# Patient Record
Sex: FEMALE | Race: WHITE | NOT HISPANIC OR LATINO | Employment: UNEMPLOYED | URBAN - METROPOLITAN AREA
[De-identification: names, ages, dates, MRNs, and addresses within clinical notes are randomized per-mention and may not be internally consistent; named-entity substitution may affect disease eponyms.]

---

## 2023-06-12 ENCOUNTER — TELEPHONE (OUTPATIENT)
Dept: NEUROLOGY | Facility: CLINIC | Age: 38
End: 2023-06-12

## 2023-06-14 ENCOUNTER — TELEPHONE (OUTPATIENT)
Dept: NEUROLOGY | Facility: CLINIC | Age: 38
End: 2023-06-14

## 2023-06-16 ENCOUNTER — OFFICE VISIT (OUTPATIENT)
Dept: NEUROLOGY | Facility: CLINIC | Age: 38
End: 2023-06-16
Payer: COMMERCIAL

## 2023-06-16 ENCOUNTER — PATIENT MESSAGE (OUTPATIENT)
Dept: NEUROLOGY | Facility: CLINIC | Age: 38
End: 2023-06-16

## 2023-06-16 ENCOUNTER — LAB VISIT (OUTPATIENT)
Dept: LAB | Facility: HOSPITAL | Age: 38
End: 2023-06-16
Attending: PHYSICIAN ASSISTANT
Payer: COMMERCIAL

## 2023-06-16 VITALS — HEART RATE: 73 BPM | SYSTOLIC BLOOD PRESSURE: 106 MMHG | WEIGHT: 121 LBS | DIASTOLIC BLOOD PRESSURE: 74 MMHG

## 2023-06-16 DIAGNOSIS — R68.89 ABULIA: ICD-10-CM

## 2023-06-16 DIAGNOSIS — F48.2 PSEUDOBULBAR AFFECT: ICD-10-CM

## 2023-06-16 DIAGNOSIS — G20.C PARKINSONISM, UNSPECIFIED PARKINSONISM TYPE: ICD-10-CM

## 2023-06-16 DIAGNOSIS — R26.81 GAIT INSTABILITY: ICD-10-CM

## 2023-06-16 DIAGNOSIS — G20.C PARKINSONISM, UNSPECIFIED PARKINSONISM TYPE: Primary | ICD-10-CM

## 2023-06-16 LAB
ALBUMIN SERPL BCP-MCNC: 3.9 G/DL (ref 3.5–5.2)
ALP SERPL-CCNC: 107 U/L (ref 55–135)
ALT SERPL W/O P-5'-P-CCNC: 35 U/L (ref 10–44)
ANION GAP SERPL CALC-SCNC: 10 MMOL/L (ref 8–16)
AST SERPL-CCNC: 27 U/L (ref 10–40)
BASOPHILS # BLD AUTO: 0.04 K/UL (ref 0–0.2)
BASOPHILS NFR BLD: 0.5 % (ref 0–1.9)
BILIRUB SERPL-MCNC: 0.3 MG/DL (ref 0.1–1)
BUN SERPL-MCNC: 16 MG/DL (ref 6–20)
CALCIUM SERPL-MCNC: 10 MG/DL (ref 8.7–10.5)
CHLORIDE SERPL-SCNC: 104 MMOL/L (ref 95–110)
CHOLEST SERPL-MCNC: 181 MG/DL (ref 120–199)
CHOLEST/HDLC SERPL: 3.4 {RATIO} (ref 2–5)
CK SERPL-CCNC: 11 U/L (ref 20–180)
CO2 SERPL-SCNC: 25 MMOL/L (ref 23–29)
CREAT SERPL-MCNC: 0.7 MG/DL (ref 0.5–1.4)
DIFFERENTIAL METHOD: NORMAL
EOSINOPHIL # BLD AUTO: 0.1 K/UL (ref 0–0.5)
EOSINOPHIL NFR BLD: 1.4 % (ref 0–8)
ERYTHROCYTE [DISTWIDTH] IN BLOOD BY AUTOMATED COUNT: 12.7 % (ref 11.5–14.5)
EST. GFR  (NO RACE VARIABLE): >60 ML/MIN/1.73 M^2
GLUCOSE SERPL-MCNC: 74 MG/DL (ref 70–110)
HCT VFR BLD AUTO: 40.7 % (ref 37–48.5)
HDLC SERPL-MCNC: 54 MG/DL (ref 40–75)
HDLC SERPL: 29.8 % (ref 20–50)
HGB BLD-MCNC: 13.2 G/DL (ref 12–16)
IMM GRANULOCYTES # BLD AUTO: 0.02 K/UL (ref 0–0.04)
IMM GRANULOCYTES NFR BLD AUTO: 0.2 % (ref 0–0.5)
LDLC SERPL CALC-MCNC: 99.8 MG/DL (ref 63–159)
LYMPHOCYTES # BLD AUTO: 2.2 K/UL (ref 1–4.8)
LYMPHOCYTES NFR BLD: 27.2 % (ref 18–48)
MCH RBC QN AUTO: 30.8 PG (ref 27–31)
MCHC RBC AUTO-ENTMCNC: 32.4 G/DL (ref 32–36)
MCV RBC AUTO: 95 FL (ref 82–98)
MONOCYTES # BLD AUTO: 0.7 K/UL (ref 0.3–1)
MONOCYTES NFR BLD: 8.9 % (ref 4–15)
NEUTROPHILS # BLD AUTO: 5 K/UL (ref 1.8–7.7)
NEUTROPHILS NFR BLD: 61.8 % (ref 38–73)
NONHDLC SERPL-MCNC: 127 MG/DL
NRBC BLD-RTO: 0 /100 WBC
PATH REV BLD -IMP: NORMAL
PLATELET # BLD AUTO: 317 K/UL (ref 150–450)
PMV BLD AUTO: 9.7 FL (ref 9.2–12.9)
POTASSIUM SERPL-SCNC: 3.6 MMOL/L (ref 3.5–5.1)
PROT SERPL-MCNC: 7.4 G/DL (ref 6–8.4)
RBC # BLD AUTO: 4.28 M/UL (ref 4–5.4)
SODIUM SERPL-SCNC: 139 MMOL/L (ref 136–145)
TRIGL SERPL-MCNC: 136 MG/DL (ref 30–150)
WBC # BLD AUTO: 8.02 K/UL (ref 3.9–12.7)

## 2023-06-16 PROCEDURE — 84446 ASSAY OF VITAMIN E: CPT | Performed by: PHYSICIAN ASSISTANT

## 2023-06-16 PROCEDURE — 80061 LIPID PANEL: CPT | Performed by: PHYSICIAN ASSISTANT

## 2023-06-16 PROCEDURE — 99205 OFFICE O/P NEW HI 60 MIN: CPT | Mod: S$GLB,,, | Performed by: PHYSICIAN ASSISTANT

## 2023-06-16 PROCEDURE — 99205 PR OFFICE/OUTPT VISIT, NEW, LEVL V, 60-74 MIN: ICD-10-PCS | Mod: S$GLB,,, | Performed by: PHYSICIAN ASSISTANT

## 2023-06-16 PROCEDURE — 81271 HTT GENE DETC ABNOR ALLELES: CPT | Performed by: PHYSICIAN ASSISTANT

## 2023-06-16 PROCEDURE — 86038 ANTINUCLEAR ANTIBODIES: CPT | Performed by: PHYSICIAN ASSISTANT

## 2023-06-16 PROCEDURE — 36415 COLL VENOUS BLD VENIPUNCTURE: CPT | Performed by: PHYSICIAN ASSISTANT

## 2023-06-16 PROCEDURE — 86039 ANTINUCLEAR ANTIBODIES (ANA): CPT | Performed by: PHYSICIAN ASSISTANT

## 2023-06-16 PROCEDURE — 85060 BLOOD SMEAR INTERPRETATION: CPT | Mod: ,,, | Performed by: PATHOLOGY

## 2023-06-16 PROCEDURE — 86235 NUCLEAR ANTIGEN ANTIBODY: CPT | Mod: 59 | Performed by: PHYSICIAN ASSISTANT

## 2023-06-16 PROCEDURE — 82300 ASSAY OF CADMIUM: CPT | Performed by: PHYSICIAN ASSISTANT

## 2023-06-16 PROCEDURE — 86376 MICROSOMAL ANTIBODY EACH: CPT | Performed by: PHYSICIAN ASSISTANT

## 2023-06-16 PROCEDURE — 99999 PR PBB SHADOW E&M-EST. PATIENT-LVL III: ICD-10-PCS | Mod: PBBFAC,,, | Performed by: PHYSICIAN ASSISTANT

## 2023-06-16 PROCEDURE — 99417 PR PROLONGED SVC, OUTPT, W/WO DIRECT PT CONTACT,  EA ADDTL 15 MIN: ICD-10-PCS | Mod: S$GLB,,, | Performed by: PHYSICIAN ASSISTANT

## 2023-06-16 PROCEDURE — 86255 FLUORESCENT ANTIBODY SCREEN: CPT | Mod: 59 | Performed by: PHYSICIAN ASSISTANT

## 2023-06-16 PROCEDURE — 80053 COMPREHEN METABOLIC PANEL: CPT | Performed by: PHYSICIAN ASSISTANT

## 2023-06-16 PROCEDURE — 82550 ASSAY OF CK (CPK): CPT | Performed by: PHYSICIAN ASSISTANT

## 2023-06-16 PROCEDURE — 86147 CARDIOLIPIN ANTIBODY EA IG: CPT | Performed by: PHYSICIAN ASSISTANT

## 2023-06-16 PROCEDURE — 99417 PROLNG OP E/M EACH 15 MIN: CPT | Mod: S$GLB,,, | Performed by: PHYSICIAN ASSISTANT

## 2023-06-16 PROCEDURE — 82390 ASSAY OF CERULOPLASMIN: CPT | Performed by: PHYSICIAN ASSISTANT

## 2023-06-16 PROCEDURE — 86225 DNA ANTIBODY NATIVE: CPT | Performed by: PHYSICIAN ASSISTANT

## 2023-06-16 PROCEDURE — 86364 TISS TRNSGLTMNASE EA IG CLAS: CPT | Mod: 59 | Performed by: PHYSICIAN ASSISTANT

## 2023-06-16 PROCEDURE — 86800 THYROGLOBULIN ANTIBODY: CPT | Performed by: PHYSICIAN ASSISTANT

## 2023-06-16 PROCEDURE — 85025 COMPLETE CBC W/AUTO DIFF WBC: CPT | Performed by: PHYSICIAN ASSISTANT

## 2023-06-16 PROCEDURE — 99999 PR PBB SHADOW E&M-EST. PATIENT-LVL III: CPT | Mod: PBBFAC,,, | Performed by: PHYSICIAN ASSISTANT

## 2023-06-16 PROCEDURE — 85060 PATHOLOGIST REVIEW: ICD-10-PCS | Mod: ,,, | Performed by: PATHOLOGY

## 2023-06-16 NOTE — PROGRESS NOTES
Name: Thania Carroll  MRN: [id number]   CSN: [id number]      Date: 06/16/2023    Referring physician:  Aaareferral Self  No address on file    Chief Complaint: bradykinesia, apathy    History of Present Illness (HPI): Thania Carroll is a R handed 38 y.o. female with a medical issues significant for juvenile RA who presents for bradykinesia, apathy. She was seen at outside facility and diagnosed with catatonia and referred to psychiatry for management. She has no known past psychiatric illness. Accompanied by , mother and father. Family provides the majority of the history. She first noticed that she had trouble using her hands with holding her baby in May of 2022. She told her mom about in June/July 2022. They think maybe she started slowing down early 2022. Noticed that it would take her longer to wash her hair. She had trouble orienting things, had trouble holding onto things and losing her . Then they noticed she was moving slower entirely, walking slower.  noticed that she would turn slowly as well. No known psychiatric illness. The only medication she has ever been on was prednisone and methotrexate. She was not pregnant at the time whenever everything started. Two kids, 2 year and 6 weeks. Saw neurologist at [facility] in Nov 2022 and then saw another neurologist in [place] (Dr. Franco). At [facility], she was diagnosed with catatonia after seeing a psychiatry intern?. She was pregnant at this time. She never had a trial of benzos. She is still breast feeding. Dr. Franco did LP. She was hospitalized in January for further work up. She was told that she was healthy. They state that Dr. Franco suspected an autoimmune disorder. She did an extended EEG at the house, it was normal. They have not followed back up with Dr. Franco. Hands started to claw up at the beginning. She has had more muscle atrophy starting in Nov 2022. Speech started changing in fall last year. Since November she has had even more of a decline.  Seemed like things got worse whenever she was pregnant (End of August). She was still driving in August although she was still very slow. 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 She started with echolalia in Sept/Oct as well as palilalia. She would have episodes -- staring, and then come back too. Would last for a minute or two. Mom thought that her speech got better after she had the baby for a day or two. She was able to communicate just a little better. She started having more gait instability in 2022 and tripped over something whenever she went to check on their son. She had a few other falls in November whenever she got out of the bathtub, fell backwards. Another fall in January. Laughs and cries at odd times, started in . She had medrol dose pack in January. No improvement.         Family History: no family history of anything similar   Maternal grandmother and grandfather  in their 80s, grandmother had ovarian cancer. Grandfather had heart disease.   Paternal grandfather passed away with heart disease, stroke. Mother is still living.     Neuroleptic Exposure: none       From outside neurology note 2023  SUBJECTIVE (2023):   On my evaluation, patient has increased latency of speech and does not speak much. Much of the history was provided by  and mother.There is echolalia, echopraxia present along with emotional lability despite a largely blunted affect. Patient would intermittently stand up during the interview, and would try to walk around/out of the exam room. Patient also noted reaching for her mother's phone and for hand  out of nowhere.   Family reports that patient began experiencing 'slowness' that began at the beginning of . The first thing they noticed was slowing down of actions (grabbing things), with worsening after 2022, when pt became pregnant. It has progressed to her speech slowing down.   She does not  "had decreased PO intake of food or drink, and has not been working at the same level as a result of her presentation. Family reports that she is being followed by [facility], and they were considering bringing pt to [facility] for further evaluation.     Per initial H&P 11/2022  "She presents with her mother and her . She is currently 14 weeks pregnant. She has an 18 months child.  Per mother who provides most of the history, she noticed Thania has been acting slow and sluggish with her overall response to conversation and her activities. This has been going on for about a year. Mother was able to show me some video clip of around this time last year while she was in the clip with a family gathering. She appeared to be with her normal self.  Over the last year, she has been noticed to be quiet, does not initiate conversation, responding to conversation slow. Her friend would describe her like in slow motion movie. She has been working as a  for a small business. However she has been very behind with her workload. Mother went to her house often find she is still in pajamas even in the afternoon and she has not been able to get things done including taking care of the baby. However she does appear to be able to answer questions appropriately and her thought process seems intact.   Mother also reports that Thania has had at least 6 falls over this year. She slipped and fell in bathroom multiple times. Other times she stumbles and can not catch herself.   states she repeats herself a lot such as saying "I know" I know"". Mother reports she almost obsessively sets up timer for no reason and would check clock / timer without a clear task.  She does get emotional easily and sometimes would laugh or cry easily. She has a smile face in the office but mother states her smile is very rigid.   She has history of rheumatoid arthritis and has had small joint contracture in both hands. The contracture has been " "a lot worse recently and she can not open her hands. She denies any pain.  She denies any tremor, shuffling. She denies any difficulty swallowing. She denies bladder or bowel incontinence. No fever, headache or dizziness. She does report feeling tired easily and would complain of being sleepy.  She denies any depression or any life event that could trigger the above symptoms.   She had a head CT done recently which reported unremarkable."    Nonmotor/Premotor ROS:  Anosmia: normal   Dysarthria/Hypophonia: yes   Dysphagia/Sialorrhea: some dysphagia with water   Urinary changes: only after she had baby   Constipation: none   Falls: yes  Freezing: yes  Micrographia: none   Sleep issues:  -RBD: rarely talked in her sleep         Review of Systems:   Review of Systems   Constitutional:  Negative for chills, fever and malaise/fatigue.   HENT:  Negative for hearing loss.    Eyes:  Negative for blurred vision and double vision.   Respiratory:  Negative for cough, shortness of breath and stridor.    Cardiovascular:  Negative for chest pain and leg swelling.   Gastrointestinal:  Negative for constipation, diarrhea and nausea.   Genitourinary:  Negative for frequency and urgency.   Musculoskeletal:  Positive for falls.   Skin:  Negative for itching and rash.   Neurological:  Positive for speech change. Negative for dizziness, tremors, loss of consciousness and weakness.   Psychiatric/Behavioral:  Positive for memory loss. Negative for hallucinations.          Past Medical History: The patient  has no past medical history on file.    Social History: The patient      Family History: Their family history is not on file.    Allergies: Patient has no allergy information on record.     Meds:   No current outpatient medications on file prior to visit.     No current facility-administered medications on file prior to visit.       Exam:  /74   Pulse 73   Wt 54.9 kg (121 lb)     Constitutional  Well-developed, well-nourished, " appears stated age   Ophthalmoscopic  No papilledema with no hemorrhages or exudates bilaterally   Cardiovascular  Radial pulses 2+ and symmetric, no LE edema bilaterally   Neurological    * Mental status  MOCA = deferred today      - Orientation      - Memory       - Attention/concentration      - Language      - Fund of knowledge      - Executive      - Other   History provided by family -- patient answers few questions including name of her daughter. Abulic    * Cranial nerves       - CN II  PERRL, visual fields full to confrontation     - CN III, IV, VI  Blink to initiate saccades, saccadic intrusions    overcome by oculocephalic reflex     - CN V  Sensation V1 - V3 intact     - CN VII  Face strong and symmetric bilaterally     - CN VIII  Hearing intact bilaterally     - CN IX, X  Palate raises midline and symmetric     - CN XI  SCM and trapezius 5/5 bilaterally     - CN XII  Tongue midline   * Motor  Muscle bulk decreased/atrophy distally > proximally    Unable to elicit fasciculations    * Sensory   Intact to light touch    * Coordination  Unable to test    * Gait  See below.   * Deep tendon reflexes  3+ and symmetric throughout   Jaw jerk present, abnormal    Crossadductors    Bilateral moffett    2-3 beats clonus bilaterally    Babinski downgoing bilaterally   * Specialized movement exam  Abullic, rarely talks in the visit.    + PBA    mod facial masking.   Mod cogwheel rigidity b/l   Apraxia throughout    No tremor with rest, posture, kinesis, or intention.    No other athetosis, myoclonus, or tics.   No motor impersistence.    Decreased arm swing bilaterally    Moderate anterior stoop    Rocket sign, attempts to get out of the chair multiple times.     Oral dyskinesia    Abnormal truncal movements     Grasp reflex    Intermittently elevates bilateral UE          Reviewed past videos -- tongue thrusting, abulic. Scanning speech with echolalia.       Laboratory/Radiological:  - Results:  No visits with  results within 3 Month(s) from this visit.   Latest known visit with results is:   No results found for any previous visit.       - Independent review of images:    Reviewed outside imaging. Caudate and putaminal atrophy bilaterally.   Hyperintensities of bilateral caudates noted on brain mri in Nov 2022, not noted on most recent mri may 2023 although imaging is limited 2/2 motion artifact.         - Independent review of consultant's notes:     ASSESSMENT/PLAN:  Parkinsonism/bradykinesia   - concerning for neurodenegerative vs autoimmune disorder   - frontal lobe dysfunction, PBA, saccadic intrusions and blink to initiate saccades   - not clearly better with pregnancy (in fact they think it got worse)   - working on doing solumedrol close to home -- will work on getting 1000 mg daily x 5 days (hopefully in smoothie form)  - pending response, may do IVIG + IV solumedrol inpatient either here or closer to home for her in [facility].   - MRI called normal however there is atrophy out of proportion for age, particularly in bilateral caudate and putamen   - ruling out acanthocytosis and other labs as below   - HD testing and genetic PD panel   - rechecking ceruloplasmin (last checked while pregnant)  and may have been falsely elevated due to pregnancy. Will also plan to do 24 hr urine copper closer to home  - PT/OT/ST     Orders Placed This Encounter    CBC auto differential    Pathologist Interpretation Differential    Ceruloplasmin    Heavy Metals Screen, Blood (Quantitative)    GISEL Screen w/Reflex    Cardiolipin antibody    Movement Disorder, Autoimmune Eval, Serum    CK    Copper, urine, 24 hour    Hereditary Parkinsons Disease Panel    Maxwell Disease Analysis    Celiac Disease Panel    Thyroid Peroxidase Antibody    Anti-Thyroglobulin Antibody    VITAMIN E    Lipid Panel    Comprehensive metabolic panel    Ambulatory referral/consult to Physical/Occupational Therapy    Ambulatory referral/consult to  Physical/Occupational Therapy    Ambulatory referral/consult to Speech Therapy           Follow up: in 6 weeks     Collaborating Physician, Dr. Lyons, was available during today's encounter. Any change to plan along with cosign to appear in the EMR.       Total time spent with the patient: 160 minutes.  120 minutes of face-to-face consultation and 40 minutes of chart review and coordination of care, on the day of the visit. This includes face to face time and non-face to face time preparing to see the patient (eg, review of tests), obtaining and/or reviewing separately obtained history, documenting clinical information in the electronic or other health record, independently interpreting resultsand communicating results to the patient/family/caregiver, or care coordination.         Noehmy Brady PA-C   [facility]  Department of Neurology  Movement Disorders

## 2023-06-17 ENCOUNTER — PATIENT MESSAGE (OUTPATIENT)
Dept: NEUROLOGY | Facility: CLINIC | Age: 38
End: 2023-06-17
Payer: COMMERCIAL

## 2023-06-17 LAB
CERULOPLASMIN SERPL-MCNC: 33 MG/DL (ref 15–45)
THYROGLOB AB SERPL IA-ACNC: <4 IU/ML (ref 0–3.9)
THYROPEROXIDASE IGG SERPL-ACNC: <6 IU/ML

## 2023-06-19 DIAGNOSIS — G04.81 AUTOIMMUNE ENCEPHALITIS: Primary | ICD-10-CM

## 2023-06-19 LAB
ANA PATTERN 1: NORMAL
ANA SER QL IF: POSITIVE
ANA TITR SER IF: NORMAL {TITER}
ARSENIC BLD-MCNC: <1 NG/ML
CADMIUM BLD-MCNC: 0.2 NG/ML
CITY: NORMAL
COUNTY: NORMAL
GUARDIAN FIRST NAME: NORMAL
GUARDIAN LAST NAME: NORMAL
HOME PHONE: NORMAL
LEAD BLD-MCNC: <1 MCG/DL
MERCURY BLD-MCNC: <1 NG/ML
PATH REV BLD -IMP: NORMAL
RACE: NORMAL
STATE: NORMAL
STREET ADDRESS: NORMAL
VENOUS/CAPILLARY: NORMAL
ZIP: NORMAL

## 2023-06-19 NOTE — TELEPHONE ENCOUNTER
----- Message from Nohemy Brady PA-C sent at 6/16/2023  3:21 PM CDT -----  Need to figure out how to get this patient 1000 mg of solumedrol x 5 days in smoothie form where they are located.

## 2023-06-19 NOTE — TELEPHONE ENCOUNTER
Spoke to pt Mom. They will talk it over and let us know if she will proceed with the IV Solumedrol.   They want to check with pediatrician too.     Will look for their response tomorrow.

## 2023-06-20 ENCOUNTER — PATIENT MESSAGE (OUTPATIENT)
Dept: NEUROLOGY | Facility: CLINIC | Age: 38
End: 2023-06-20
Payer: COMMERCIAL

## 2023-06-20 LAB
ANTI SM ANTIBODY: 0.09 RATIO (ref 0–0.99)
ANTI SM/RNP ANTIBODY: 0.1 RATIO (ref 0–0.99)
ANTI-SM INTERPRETATION: NEGATIVE
ANTI-SM/RNP INTERPRETATION: NEGATIVE
ANTI-SSA ANTIBODY: 0.09 RATIO (ref 0–0.99)
ANTI-SSA INTERPRETATION: NEGATIVE
ANTI-SSB ANTIBODY: 0.09 RATIO (ref 0–0.99)
ANTI-SSB INTERPRETATION: NEGATIVE
DSDNA AB SER-ACNC: NORMAL [IU]/ML

## 2023-06-20 NOTE — TELEPHONE ENCOUNTER
Placed call to [facility]. Gave verbal order for oral Solumedrol   [facility] will mail it to the patient.      1g/vial- combine into a smoothie x 5 days.

## 2023-06-20 NOTE — TELEPHONE ENCOUNTER
[address]  [address]  [place], [place] [address]  Phone: [phone]  Fax: [phone]  Infusion Suite

## 2023-06-26 ENCOUNTER — PATIENT MESSAGE (OUTPATIENT)
Dept: NEUROLOGY | Facility: CLINIC | Age: 38
End: 2023-06-26
Payer: COMMERCIAL

## 2023-06-26 LAB
AMPA-R AB CBA, SERUM: NEGATIVE
AMPHIPHYSIN AB TITR SER: NEGATIVE {TITER}
AP3B2 IFA: NEGATIVE
CASPR2-IGG CBA: NEGATIVE
CV2 IGG SER QL IB: NEGATIVE
DPPX IGG SERPL QL IF: NEGATIVE
GABA-B-R AB CBA, SERUM: NEGATIVE
GAD65 AB SER-SCNC: 0 NMOL/L
GFAP ALPHA IGG SER QL IF: NEGATIVE
GLIAL NUC TYPE 1 AB TITR SER: NEGATIVE {TITER}
GRAF1 IFA,S: NEGATIVE
HU1 AB TITR SER: NEGATIVE {TITER}
HU2 AB TITR SER IF: NEGATIVE {TITER}
HU3 AB TITR SER: NEGATIVE {TITER}
IGLON5 IFA, S: NEGATIVE
IMMUNOLOGIST REVIEW: NORMAL
ITPR1 IFA, S: NEGATIVE
KELCH-LIKE PROTEIN ANTIBODY, SERUM: NEGATIVE
LGI1-IGG CBA: NEGATIVE
M SEPTIN-5 IFA, S: NEGATIVE
M SEPTIN-7 IFA, S: NEGATIVE
MGLUR1 AB IFA, SERUM: NEGATIVE
NEUROCHONDRIN, IFA, S: NEGATIVE
NIF IFA, S: NEGATIVE
NMDA-R AB CBA, SERUM: NEGATIVE
PCA-1 AB TITR SER: NEGATIVE {TITER}
PCA-2 AB TITR SER: NEGATIVE {TITER}
PCA-TR AB TITR SER: NEGATIVE {TITER}
VGCC-P/Q BIND AB SER-SCNC: 0 NMOL/L

## 2023-06-27 ENCOUNTER — TELEPHONE (OUTPATIENT)
Dept: NEUROLOGY | Facility: CLINIC | Age: 38
End: 2023-06-27
Payer: COMMERCIAL

## 2023-06-27 NOTE — TELEPHONE ENCOUNTER
----- Message from Pramodron Lux sent at 6/27/2023  3:55 PM CDT -----  Regarding: Specimen Issue  There was an issue with the Vit E, Anti Cardiolipin and Celiac disease test ordered on this patient from 06/16/23.    Due to a shipping error, the reference lab received the sample thawed. The order has been cancelled. You will need to reorder and contact the patient for recollection if clinically indicated.    If there are any questions, please call the Sendout lab at [phone] ext. [phone].  Anyone in the Sendout lab will be able to assist you.

## 2023-06-27 NOTE — TELEPHONE ENCOUNTER
----- Message from Loree Astorga sent at 6/27/2023  9:45 AM CDT -----  Regarding: pt advice  Contact: prakash Michele @[phone]  Caller requesting call back in regards to pt care due to fall and injuring head. Pls call to discuss. Caller stated it is an emergency type situation. Pt is seen by Dr Lyons as well, anyone can respond to message.

## 2023-06-27 NOTE — TELEPHONE ENCOUNTER
----- Message from Darling Mata sent at 6/27/2023 12:00 PM CDT -----  Regarding: return call  Contact: @ [phone]  Pt is returning a missed call from [facility]... in regards to plan of care ...Please call and adv @ [phone]

## 2023-06-27 NOTE — TELEPHONE ENCOUNTER
----- Message from Darling Mata sent at 6/27/2023 12:00 PM CDT -----  Regarding: return call  Contact: @ [phone]  Pt is returning a missed call from [facility]... in regards to plan of care ...Please call and adv @ [phone]

## 2023-06-27 NOTE — TELEPHONE ENCOUNTER
"Called patient's mother. She feels she is talking more/initiating conversation a bit more/answering questions. Fell and has gash on her head, planning on bring her to urgent care or ED to see if she needs sutures. Mental status at baseline. "Im ready to feed my baby momma" which was a long sentence for her, more conversation. Back to talking to Patricia more. Not as much echolalia. Not a marked improvement but mother has noticed some improvement. Just had 4th dose of steroids.  thinks she is louder and moving a little bit but not a robust improvement with the steroids. Discussed plan would be to do inpatient IVIG + iv solumedrol.       RBR    "

## 2023-06-27 NOTE — TELEPHONE ENCOUNTER
----- Message from Loree Astorga sent at 6/27/2023  9:45 AM CDT -----  Regarding: pt advice  Contact: prakash Michele @[phone]  Caller requesting call back in regards to pt care due to fall and injuring head. Pls call to discuss. Caller stated it is an emergency type situation. Pt is seen by Dr Lyons as well, anyone can respond to message.

## 2023-06-27 NOTE — TELEPHONE ENCOUNTER
Patient message was forward to Nurse  Fran.+    Note  ----- Message from Loree Astorga sent at 6/27/2023  9:45 AM CDT -----  Regarding: pt advice  Contact: prakash Michele @[phone]  Caller requesting call back in regards to pt care due to fall and injuring head. Pls call to discuss. Caller stated it is an emergency type situation. Pt is seen by Dr Lyons as well, anyone can respond to message.

## 2023-06-28 ENCOUNTER — TELEPHONE (OUTPATIENT)
Dept: NEUROLOGY | Facility: CLINIC | Age: 38
End: 2023-06-28
Payer: COMMERCIAL

## 2023-06-28 ENCOUNTER — PATIENT MESSAGE (OUTPATIENT)
Dept: NEUROLOGY | Facility: CLINIC | Age: 38
End: 2023-06-28
Payer: COMMERCIAL

## 2023-06-28 LAB
CLINICAL BIOCHEMIST REVIEW: NORMAL
HD REASON FOR REFERRAL: NORMAL
HD RELEASED BY: NORMAL
HD RESULT SUMMARY: NEGATIVE
HD RESULT: NORMAL
HD SPECIMEN: NORMAL
SPECIMEN SOURCE: NORMAL

## 2023-06-28 NOTE — TELEPHONE ENCOUNTER
----- Message from Ирина Prado sent at 2023  4:07 PM CDT -----  Regarding: Missed Call  Contact: [phone]  Pt is returning a missed call from someone in the office and is asking for a return call back soon. Thanks.    Reason for call: Missed call from beau in regards to a fall    Patient's DX:n/a    Patient requesting call back or Yovanichsalazar [phone]

## 2023-07-03 ENCOUNTER — PATIENT MESSAGE (OUTPATIENT)
Dept: NEUROLOGY | Facility: CLINIC | Age: 38
End: 2023-07-03
Payer: COMMERCIAL

## 2023-07-11 ENCOUNTER — PATIENT MESSAGE (OUTPATIENT)
Dept: NEUROLOGY | Facility: CLINIC | Age: 38
End: 2023-07-11
Payer: COMMERCIAL

## 2023-07-11 DIAGNOSIS — G20.C PARKINSONISM, UNSPECIFIED PARKINSONISM TYPE: Primary | ICD-10-CM

## 2023-07-11 NOTE — TELEPHONE ENCOUNTER
Address, Phone and Hours  [phone]  [facility] [address], [place], [address],

## 2023-07-14 ENCOUNTER — TELEPHONE (OUTPATIENT)
Dept: NEUROLOGY | Facility: CLINIC | Age: 38
End: 2023-07-14
Payer: COMMERCIAL

## 2023-07-14 DIAGNOSIS — R68.89 ABULIA: ICD-10-CM

## 2023-07-14 DIAGNOSIS — F48.2 PSEUDOBULBAR AFFECT: ICD-10-CM

## 2023-07-14 DIAGNOSIS — G20.C PARKINSONISM, UNSPECIFIED PARKINSONISM TYPE: Primary | ICD-10-CM

## 2023-07-14 LAB — HEREDITARY PARKINSONS DISEASE PANEL: NORMAL

## 2023-07-14 NOTE — TELEPHONE ENCOUNTER
Called number provided with no answer.  I was able to reprint out a copy of her labs that were faxed over to [facility] on 07/7/23.  Orders fax: [phone]

## 2023-07-14 NOTE — TELEPHONE ENCOUNTER
----- Message from Ambrocio Ingram sent at 7/14/2023 11:30 AM CDT -----  Regarding: Orders  Contact: Cindy/ [phone]  [facility] orders were not received, would like to know where orders were faxed, please call Cindy/,mother @[phone]    Please fax to: [address] in [place] [phone]

## 2023-07-21 NOTE — PROGRESS NOTES
Name: Thania Carroll  MRN: [id number]   CSN: [id number]      Date: 07/24/2023    Referring physician:  No referring provider defined for this encounter.    Chief Complaint: bradykinesia, apathy      Interval History:  - 5 day course of solumedrol that did not show much improvement   - maybe some euphoria while on steroids   - more imbalance   - several falls since last visit, when going from sitting to standing, turning   - accompanied by spouse and parents today   - 5 years prior she traveled to [place], [place], [facility]   - had neurofilament light chain at outside lab  - 8.78 pg/ml (reference range 0.0-1.87)  - some choking on foods and liquids         From June 2023: Thania Carroll is a R handed 38 y.o. female with a medical issues significant for juvenile RA who presents for bradykinesia, apathy. She was seen at outside facility and diagnosed with catatonia and referred to psychiatry for management. She has no known past psychiatric illness. Accompanied by , mother and father. Family provides the majority of the history. She first noticed that she had trouble using her hands with holding her baby in May of 2022. She told her mom about in June/July 2022. They think maybe she started slowing down early 2022. Noticed that it would take her longer to wash her hair. She had trouble orienting things, had trouble holding onto things and losing her . Then they noticed she was moving slower entirely, walking slower.  noticed that she would turn slowly as well. No known psychiatric illness. The only medication she has ever been on was prednisone and methotrexate. She was not pregnant at the time whenever everything started. Two kids, 2 year and 6 weeks. Saw neurologist at [facility] in Nov 2022 and then saw another neurologist in [place] (Dr. Franco). At [facility], she was diagnosed with catatonia after seeing a psychiatry intern?. She was pregnant at this time. She never had a trial of benzos. She is still  breast feeding. Dr. Franco did LP. She was hospitalized in January for further work up. She was told that she was healthy. They state that Dr. Franco suspected an autoimmune disorder. She did an extended EEG at the house, it was normal. They have not followed back up with Dr. Franco. Hands started to claw up at the beginning. She has had more muscle atrophy starting in 2022. Speech started changing in  last year. Since November she has had even more of a decline. Seemed like things got worse whenever she was pregnant (End of August). She was still driving in August although she was still very slow. 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 She started with echolalia in Sept/Oct as well as palilalia. She would have episodes -- staring, and then come back too. Would last for a minute or two. Mom thought that her speech got better after she had the baby for a day or two. She was able to communicate just a little better. She started having more gait instability in 2022 and tripped over something whenever she went to check on their son. She had a few other falls in November whenever she got out of the bathtub, fell backwards. Another fall in January. Laughs and cries at odd times, started in . She had medrol dose pack in January. No improvement.         Family History: no family history of anything similar   Maternal grandmother and grandfather  in their 80s, grandmother had ovarian cancer. Grandfather had heart disease.   Paternal grandfather passed away with heart disease, stroke. Mother is still living.     Neuroleptic Exposure: none       From outside neurology note 2023  SUBJECTIVE (2023):   On my evaluation, patient has increased latency of speech and does not speak much. Much of the history was provided by  and mother.There is echolalia, echopraxia present along with emotional lability despite a largely blunted affect. Patient would  "intermittently stand up during the interview, and would try to walk around/out of the exam room. Patient also noted reaching for her mother's phone and for hand  out of nowhere.   Family reports that patient began experiencing 'slowness' that began at the beginning of 2022. The first thing they noticed was slowing down of actions (grabbing things), with worsening after August 2022, when pt became pregnant. It has progressed to her speech slowing down.   She does not had decreased PO intake of food or drink, and has not been working at the same level as a result of her presentation. Family reports that she is being followed by [facility], and they were considering bringing pt to [facility] for further evaluation.     Per initial H&P 11/2022  "She presents with her mother and her . She is currently 14 weeks pregnant. She has an 18 months child.  Per mother who provides most of the history, she noticed Thania has been acting slow and sluggish with her overall response to conversation and her activities. This has been going on for about a year. Mother was able to show me some video clip of around this time last year while she was in the clip with a family gathering. She appeared to be with her normal self.  Over the last year, she has been noticed to be quiet, does not initiate conversation, responding to conversation slow. Her friend would describe her like in slow motion movie. She has been working as a  for a small business. However she has been very behind with her workload. Mother went to her house often find she is still in pajamas even in the afternoon and she has not been able to get things done including taking care of the baby. However she does appear to be able to answer questions appropriately and her thought process seems intact.   Mother also reports that Thania has had at least 6 falls over this year. She slipped and fell in bathroom multiple times. Other times she stumbles and can " "not catch herself.   states she repeats herself a lot such as saying "I know" I know"". Mother reports she almost obsessively sets up timer for no reason and would check clock / timer without a clear task.  She does get emotional easily and sometimes would laugh or cry easily. She has a smile face in the office but mother states her smile is very rigid.   She has history of rheumatoid arthritis and has had small joint contracture in both hands. The contracture has been a lot worse recently and she can not open her hands. She denies any pain.  She denies any tremor, shuffling. She denies any difficulty swallowing. She denies bladder or bowel incontinence. No fever, headache or dizziness. She does report feeling tired easily and would complain of being sleepy.  She denies any depression or any life event that could trigger the above symptoms.   She had a head CT done recently which reported unremarkable."    Nonmotor/Premotor ROS:  Anosmia: normal   Dysarthria/Hypophonia: yes   Dysphagia/Sialorrhea: some dysphagia with water   Urinary changes: only after she had baby   Constipation: none   Falls: yes  Freezing: yes  Micrographia: none   Sleep issues:  -RBD: rarely talked in her sleep         Review of Systems:   Review of Systems   Constitutional:  Negative for chills, fever and malaise/fatigue.   HENT:  Negative for hearing loss.    Eyes:  Negative for blurred vision and double vision.   Respiratory:  Negative for cough, shortness of breath and stridor.    Cardiovascular:  Negative for chest pain and leg swelling.   Gastrointestinal:  Negative for constipation, diarrhea and nausea.   Genitourinary:  Negative for frequency and urgency.   Musculoskeletal:  Positive for falls.   Skin:  Negative for itching and rash.   Neurological:  Positive for speech change. Negative for dizziness, tremors, loss of consciousness and weakness.   Psychiatric/Behavioral:  Positive for memory loss. Negative for hallucinations.  "         Past Medical History: The patient  has no past medical history on file.    Social History: The patient      Family History: Their family history is not on file.    Allergies: Patient has no known allergies.     Meds:   Current Outpatient Medications on File Prior to Visit   Medication Sig Dispense Refill    methylPREDNISolone sodium succinate (SOLU-MEDROL) 1,000 mg injection MIX 1g (1 SYRINGE) into a smoothie to take once daily for 5 doses 5 each 0     No current facility-administered medications on file prior to visit.       Exam:  /69   Pulse 76   Wt 55.3 kg (122 lb)     Constitutional  Well-developed, well-nourished, appears stated age   Ophthalmoscopic  No papilledema with no hemorrhages or exudates bilaterally   Cardiovascular  Radial pulses 2+ and symmetric, no LE edema bilaterally   Neurological    * Mental status  MOCA = deferred today      - Orientation      - Memory       - Attention/concentration      - Language      - Fund of knowledge      - Executive      - Other   History provided by family -- patient answers few questions including name of her daughter. Abulic    * Cranial nerves       - CN II  PERRL, visual fields full to confrontation     - CN III, IV, VI  Blink to initiate saccades, saccadic intrusions    overcome by oculocephalic reflex     - CN V  Sensation V1 - V3 intact     - CN VII  Face strong and symmetric bilaterally     - CN VIII  Hearing intact bilaterally     - CN IX, X  Palate raises midline and symmetric     - CN XI  SCM and trapezius 5/5 bilaterally     - CN XII  Tongue midline   * Motor  Muscle bulk decreased/atrophy distally > proximally    Unable to elicit fasciculations    * Sensory   Intact to light touch    * Coordination  Unable to test    * Gait  See below.   * Deep tendon reflexes  3+ and symmetric throughout   Jaw jerk present, abnormal    Crossadductors    Bilateral moffett    2-3 beats clonus bilaterally    Babinski downgoing bilaterally   * Specialized  movement exam  Abullic, rarely talks in the visit.    + PBA    mod facial masking.   Mod cogwheel rigidity b/l   Apraxia throughout    No tremor with rest, posture, kinesis, or intention.    No other athetosis, myoclonus, or tics.   No motor impersistence.    Decreased arm swing bilaterally    Moderate anterior stoop    Rocket sign, attempts to get out of the chair multiple times.     Oral dyskinesia    Abnormal truncal movements     Grasp reflex    Intermittently elevates bilateral UE          Reviewed past videos -- tongue thrusting, abulic. Scanning speech with echolalia.       Laboratory/Radiological:  - Results:  Lab Visit on 06/16/2023   Component Date Value Ref Range Status    WBC 06/16/2023 8.02  3.90 - 12.70 K/uL Final    RBC 06/16/2023 4.28  4.00 - 5.40 M/uL Final    Hemoglobin 06/16/2023 13.2  12.0 - 16.0 g/dL Final    Hematocrit 06/16/2023 40.7  37.0 - 48.5 % Final    MCV 06/16/2023 95  82 - 98 fL Final    MCH 06/16/2023 30.8  27.0 - 31.0 pg Final    MCHC 06/16/2023 32.4  32.0 - 36.0 g/dL Final    RDW 06/16/2023 12.7  11.5 - 14.5 % Final    Platelets 06/16/2023 317  150 - 450 K/uL Final    MPV 06/16/2023 9.7  9.2 - 12.9 fL Final    Immature Granulocytes 06/16/2023 0.2  0.0 - 0.5 % Final    Gran # (ANC) 06/16/2023 5.0  1.8 - 7.7 K/uL Final    Immature Grans (Abs) 06/16/2023 0.02  0.00 - 0.04 K/uL Final    Lymph # 06/16/2023 2.2  1.0 - 4.8 K/uL Final    Mono # 06/16/2023 0.7  0.3 - 1.0 K/uL Final    Eos # 06/16/2023 0.1  0.0 - 0.5 K/uL Final    Baso # 06/16/2023 0.04  0.00 - 0.20 K/uL Final    nRBC 06/16/2023 0  0 /100 WBC Final    Gran % 06/16/2023 61.8  38.0 - 73.0 % Final    Lymph % 06/16/2023 27.2  18.0 - 48.0 % Final    Mono % 06/16/2023 8.9  4.0 - 15.0 % Final    Eosinophil % 06/16/2023 1.4  0.0 - 8.0 % Final    Basophil % 06/16/2023 0.5  0.0 - 1.9 % Final    Differential Method 06/16/2023 Automated   Final    Pathologist Review 06/16/2023 Review completed   Final    Ceruloplasmin 06/16/2023 33.0   15.0 - 45.0 mg/dL Final    Arsenic 06/16/2023 <1  <13 ng/mL Final    Lead 06/16/2023 <1.0  <5.0 mcg/dL Final    Cadmium 06/16/2023 0.2  <5.0 ng/mL Final    Mercury 06/16/2023 <1  <10 ng/mL Final    Venous/Capillary 06/16/2023 Venous   Final    Street Address 06/16/2023 Test Not Performed   Final    City 06/16/2023 Test Not Performed   Final    State 06/16/2023 Test Not Performed   Final    Zip 06/16/2023 Test Not Performed   Final    [facility] 06/16/2023 Test Not Performed   Final    Guardian First Name 06/16/2023 Test Not Performed   Final    Guardian Last Name 06/16/2023 Test Not Performed   Final    Home Phone 06/16/2023 Test Not Performed   Final    Race 06/16/2023 Test Not Performed   Final    GISEL Screen 06/16/2023 Positive (A)  Negative <1:80 Final    PAVAL,  Amphiphysin Ab, Serum 06/16/2023 Negative  Negative Final    PAVAL AGNA-1, Serum 06/16/2023 Negative  Negative Final    PAVAL EDD-1, Serum 06/16/2023 Negative  Negative Final    PAVAL EDD-2, Serum 06/16/2023 Negative  Negative Final    PAVAL EDD-3, Serum 06/16/2023 Negative  Negative Final    CASPR2-IgG CBA 06/16/2023 Negative  Negative Final    Collapsin Response- Protei* 06/16/2023 Negative  Negative Final    DPPX Ab IFA, S 06/16/2023 Negative  Negative Final    Glutamic Acid Decarb Ab 06/16/2023 0.00  <=0.02 nmol/L Final    LGI1-IgG CBA 06/16/2023 Negative  Negative Final    mGluR1 Ab, IFA, Serum 06/16/2023 Negative  Negative Final    NMDA-R Ab CBA, Serum 06/16/2023 Negative  Negative Final    P/Q Type Calcium Channel Ab 06/16/2023 0.00  <=0.02 nmol/L Final    PAVAL, PCA-1, Serum 06/16/2023 Negative  Negative Final    PAVAL, PCA-2, Serum 06/16/2023 Negative  Negative Final    PAVAL, PCA-Tr, Serum 06/16/2023 Negative  Negative Final    GRAF1 IFA,S 06/16/2023 Negative  Negative Final    IgLON5 IFA, S 06/16/2023 Negative  Negative Final    ITPR1 IFA, S 06/16/2023 Negative  Negative Final    NIF IFA, S 06/16/2023 Negative  Negative Final     Movement Disorder Interpretation 06/16/2023 SEE BELOW   Final    KLHK 11 Antibody CBA 06/16/2023 Negative  Negative Final    AP3B2 IFA 06/16/2023 Negative  Negative Final    GFAP IFA, S 06/16/2023 Negative  Negative Final    M SEPTIN-5 IFA, S 06/16/2023 Negative  Negative Final    M SEPTIN-7 IFA, S 06/16/2023 Negative  Negative Final    NEUROCHONDRIN, IFA, S 06/16/2023 Negative  Negative Final    AMPA-R Ab CBA, Serum 06/16/2023 Negative  Negative Final    SALINA-B-R Ab CBA, Serum 06/16/2023 Negative  Negative Final    CPK 06/16/2023 11 (L)  20 - 180 U/L Final    Hereditary Parkinsons Disease Panel 06/16/2023 See result image via hyperlink   Corrected    HD Result Summary 06/16/2023 NEGATIVE   Final    HD Result 06/16/2023 CAG repeat: 18 and 22 (Normal)   Final    HD interpretation 06/16/2023 SEE BELOW   Final    HD Reason for Referral 06/16/2023 SEE BELOW   Final    HD Specimen 06/16/2023 WB Whole Blood   Final    HD Source 06/16/2023 Test Not Performed   Final    HD Released By 06/16/2023 Tyalor Bueno M.D.   Final    Thyroperoxidase Antibodies 06/16/2023 <6.0  <6.0 IU/mL Final    Thyroglobulin Ab Screen 06/16/2023 <4.0  0.0 - 3.9 IU/mL Final    Cholesterol 06/16/2023 181  120 - 199 mg/dL Final    Triglycerides 06/16/2023 136  30 - 150 mg/dL Final    HDL 06/16/2023 54  40 - 75 mg/dL Final    LDL Cholesterol 06/16/2023 99.8  63.0 - 159.0 mg/dL Final    HDL/Cholesterol Ratio 06/16/2023 29.8  20.0 - 50.0 % Final    Total Cholesterol/HDL Ratio 06/16/2023 3.4  2.0 - 5.0 Final    Non-HDL Cholesterol 06/16/2023 127  mg/dL Final    Sodium 06/16/2023 139  136 - 145 mmol/L Final    Potassium 06/16/2023 3.6  3.5 - 5.1 mmol/L Final    Chloride 06/16/2023 104  95 - 110 mmol/L Final    CO2 06/16/2023 25  23 - 29 mmol/L Final    Glucose 06/16/2023 74  70 - 110 mg/dL Final    BUN 06/16/2023 16  6 - 20 mg/dL Final    Creatinine 06/16/2023 0.7  0.5 - 1.4 mg/dL Final    Calcium 06/16/2023 10.0  8.7 - 10.5 mg/dL Final    Total  Protein 06/16/2023 7.4  6.0 - 8.4 g/dL Final    Albumin 06/16/2023 3.9  3.5 - 5.2 g/dL Final    Total Bilirubin 06/16/2023 0.3  0.1 - 1.0 mg/dL Final    Alkaline Phosphatase 06/16/2023 107  55 - 135 U/L Final    AST 06/16/2023 27  10 - 40 U/L Final    ALT 06/16/2023 35  10 - 44 U/L Final    Anion Gap 06/16/2023 10  8 - 16 mmol/L Final    eGFR 06/16/2023 >60.0  >60 mL/min/1.73 m^2 Final    Pathologist Review Peripheral Smear 06/16/2023 REVIEWED   Final    Anti Sm Antibody 06/16/2023 0.09  0.00 - 0.99 Ratio Final    Anti-Sm Interpretation 06/16/2023 Negative  Negative Final    Anti-SSA Antibody 06/16/2023 0.09  0.00 - 0.99 Ratio Final    Anti-SSA Interpretation 06/16/2023 Negative  Negative Final    Anti-SSB Antibody 06/16/2023 0.09  0.00 - 0.99 Ratio Final    Anti-SSB Interpretation 06/16/2023 Negative  Negative Final    ds DNA Ab 06/16/2023 Negative 1:10  Negative 1:10 Final    Anti Sm/RNP Antibody 06/16/2023 0.10  0.00 - 0.99 Ratio Final    Anti-Sm/RNP Interpretation 06/16/2023 Negative  Negative Final    GISEL PATTERN 1 06/16/2023 Homogeneous   Final    GISEL Titer 1 06/16/2023 1:640   Final       - Independent review of images:    Reviewed outside imaging. Caudate and putaminal atrophy bilaterally.   Hyperintensities of bilateral caudates noted on brain mri in Nov 2022, not noted on most recent mri may 2023 although imaging is limited 2/2 motion artifact.         - Independent review of consultant's notes:     ASSESSMENT/PLAN:  Parkinsonism/bradykinesia   - concerning for neurodenegerative vs autoimmune disorder   - frontal lobe dysfunction, PBA, saccadic intrusions and blink to initiate saccades   - not clearly better with pregnancy (in fact they think it got worse)  - MRI called normal however there is atrophy out of proportion for age, particularly in bilateral caudate and putamen   - HD testing and genetic PD/FTD panel negative   - negative for acanthocytosis   - PT/OT/ST     - no clear improvement with just  solumedrol x 5 days   -- plan for inpatient IVIG + solumedrol X 3-5 days as well as repeat LP with encephalopathy panel (send to [facility]) + movement panel to rule out NMDAr encephalitis and 14-3-3, total tau RT-QuIC   - LP PRIOR TO IVIG AND SOLUMEDROL vs plex  - neurofilament light chain   - EEG to eval for delta brush   - consult ID prior to LP ?  - EMG -- PSP/PLS? FTD/ALS?   - consider CT chest/abd/pelv vs transvaginal ultrasound                Collaborating Physician, Dr. Lyons, was available during today's encounter. Any change to plan along with cosign to appear in the EMR.       Total time spent with the patient: 79 minutes.  56 minutes of face-to-face consultation and 23 minutes of chart review and coordination of care, on the day of the visit. This includes face to face time and non-face to face time preparing to see the patient (eg, review of tests), obtaining and/or reviewing separately obtained history, documenting clinical information in the electronic or other health record, independently interpreting resultsand communicating results to the patient/family/caregiver, or care coordination.         Nohemy Brady PA-C   [facility]  Department of Neurology  Movement Disorders

## 2023-07-24 ENCOUNTER — OFFICE VISIT (OUTPATIENT)
Dept: NEUROLOGY | Facility: CLINIC | Age: 38
End: 2023-07-24
Payer: COMMERCIAL

## 2023-07-24 ENCOUNTER — TELEPHONE (OUTPATIENT)
Dept: NEUROLOGY | Facility: CLINIC | Age: 38
End: 2023-07-24
Payer: COMMERCIAL

## 2023-07-24 VITALS — SYSTOLIC BLOOD PRESSURE: 102 MMHG | DIASTOLIC BLOOD PRESSURE: 69 MMHG | HEART RATE: 76 BPM | WEIGHT: 122 LBS

## 2023-07-24 DIAGNOSIS — F48.2 PSEUDOBULBAR AFFECT: ICD-10-CM

## 2023-07-24 DIAGNOSIS — R68.89 ABULIA: ICD-10-CM

## 2023-07-24 DIAGNOSIS — G20.C PARKINSONISM, UNSPECIFIED PARKINSONISM TYPE: Primary | ICD-10-CM

## 2023-07-24 DIAGNOSIS — R26.81 GAIT INSTABILITY: ICD-10-CM

## 2023-07-24 DIAGNOSIS — Z71.89 COUNSELING REGARDING GOALS OF CARE: ICD-10-CM

## 2023-07-24 PROCEDURE — 99215 OFFICE O/P EST HI 40 MIN: CPT | Mod: S$GLB,,, | Performed by: PHYSICIAN ASSISTANT

## 2023-07-24 PROCEDURE — 99215 PR OFFICE/OUTPT VISIT, EST, LEVL V, 40-54 MIN: ICD-10-PCS | Mod: S$GLB,,, | Performed by: PHYSICIAN ASSISTANT

## 2023-07-24 PROCEDURE — 99999 PR PBB SHADOW E&M-EST. PATIENT-LVL III: ICD-10-PCS | Mod: PBBFAC,,, | Performed by: PHYSICIAN ASSISTANT

## 2023-07-24 PROCEDURE — 99999 PR PBB SHADOW E&M-EST. PATIENT-LVL III: CPT | Mod: PBBFAC,,, | Performed by: PHYSICIAN ASSISTANT

## 2023-07-24 NOTE — TELEPHONE ENCOUNTER
----- Message from Kasia Cisneros sent at 7/24/2023  4:25 PM CDT -----  Regarding: Questions before hospital admittance  Contact: Cindy (mom) [phone]  Pts mom calling in regards to questions before hospital admittance. Please call to discuss as soon as possible

## 2023-07-25 ENCOUNTER — HOSPITAL ENCOUNTER (INPATIENT)
Facility: HOSPITAL | Age: 38
LOS: 14 days | Discharge: HOME | DRG: 057 | End: 2023-08-08
Attending: EMERGENCY MEDICINE | Admitting: HOSPITALIST
Payer: COMMERCIAL

## 2023-07-25 ENCOUNTER — PATIENT MESSAGE (OUTPATIENT)
Dept: NEUROLOGY | Facility: CLINIC | Age: 38
End: 2023-07-25
Payer: COMMERCIAL

## 2023-07-25 ENCOUNTER — TELEPHONE (OUTPATIENT)
Dept: NEUROLOGY | Facility: CLINIC | Age: 38
End: 2023-07-25
Payer: COMMERCIAL

## 2023-07-25 DIAGNOSIS — Z78.9 BREASTFEEDING (INFANT): ICD-10-CM

## 2023-07-25 DIAGNOSIS — R07.9 CHEST PAIN: ICD-10-CM

## 2023-07-25 DIAGNOSIS — R25.8 BRADYKINESIA: Primary | ICD-10-CM

## 2023-07-25 DIAGNOSIS — G31.9 NEURODEGENERATIVE DISORDER: ICD-10-CM

## 2023-07-25 DIAGNOSIS — M08.00 JUVENILE RHEUMATOID ARTHRITIS: ICD-10-CM

## 2023-07-25 DIAGNOSIS — R91.8 GROUND GLASS OPACITY PRESENT ON IMAGING OF LUNG: ICD-10-CM

## 2023-07-25 DIAGNOSIS — G20.C PARKINSONISM, UNSPECIFIED PARKINSONISM TYPE: ICD-10-CM

## 2023-07-25 DIAGNOSIS — R53.1 GENERALIZED WEAKNESS: ICD-10-CM

## 2023-07-25 DIAGNOSIS — R13.12 OROPHARYNGEAL DYSPHAGIA: ICD-10-CM

## 2023-07-25 LAB
ALBUMIN SERPL BCP-MCNC: 3.7 G/DL (ref 3.5–5.2)
ALP SERPL-CCNC: 87 U/L (ref 55–135)
ALT SERPL W/O P-5'-P-CCNC: 40 U/L (ref 10–44)
ANION GAP SERPL CALC-SCNC: 9 MMOL/L (ref 8–16)
AST SERPL-CCNC: 23 U/L (ref 10–40)
B-HCG UR QL: NEGATIVE
BASOPHILS # BLD AUTO: 0.02 K/UL (ref 0–0.2)
BASOPHILS NFR BLD: 0.4 % (ref 0–1.9)
BILIRUB SERPL-MCNC: 0.4 MG/DL (ref 0.1–1)
BILIRUB UR QL STRIP: NEGATIVE
BUN SERPL-MCNC: 11 MG/DL (ref 6–20)
CALCIUM SERPL-MCNC: 9.1 MG/DL (ref 8.7–10.5)
CHLORIDE SERPL-SCNC: 107 MMOL/L (ref 95–110)
CLARITY UR REFRACT.AUTO: CLEAR
CO2 SERPL-SCNC: 27 MMOL/L (ref 23–29)
COLOR UR AUTO: COLORLESS
CREAT SERPL-MCNC: 0.7 MG/DL (ref 0.5–1.4)
CTP QC/QA: YES
DIFFERENTIAL METHOD: NORMAL
EOSINOPHIL # BLD AUTO: 0.1 K/UL (ref 0–0.5)
EOSINOPHIL NFR BLD: 1.3 % (ref 0–8)
ERYTHROCYTE [DISTWIDTH] IN BLOOD BY AUTOMATED COUNT: 13.6 % (ref 11.5–14.5)
EST. GFR  (NO RACE VARIABLE): >60 ML/MIN/1.73 M^2
FERRITIN SERPL-MCNC: 78 NG/ML (ref 20–300)
GLUCOSE SERPL-MCNC: 80 MG/DL (ref 70–110)
GLUCOSE UR QL STRIP: NEGATIVE
HCT VFR BLD AUTO: 39.6 % (ref 37–48.5)
HGB BLD-MCNC: 13.2 G/DL (ref 12–16)
HGB UR QL STRIP: NEGATIVE
IMM GRANULOCYTES # BLD AUTO: 0.01 K/UL (ref 0–0.04)
IMM GRANULOCYTES NFR BLD AUTO: 0.2 % (ref 0–0.5)
KETONES UR QL STRIP: NEGATIVE
LEUKOCYTE ESTERASE UR QL STRIP: NEGATIVE
LYMPHOCYTES # BLD AUTO: 1.6 K/UL (ref 1–4.8)
LYMPHOCYTES NFR BLD: 29.1 % (ref 18–48)
MCH RBC QN AUTO: 30.9 PG (ref 27–31)
MCHC RBC AUTO-ENTMCNC: 33.3 G/DL (ref 32–36)
MCV RBC AUTO: 93 FL (ref 82–98)
MONOCYTES # BLD AUTO: 0.7 K/UL (ref 0.3–1)
MONOCYTES NFR BLD: 13.6 % (ref 4–15)
NEUTROPHILS # BLD AUTO: 3 K/UL (ref 1.8–7.7)
NEUTROPHILS NFR BLD: 55.4 % (ref 38–73)
NITRITE UR QL STRIP: NEGATIVE
NRBC BLD-RTO: 0 /100 WBC
PH UR STRIP: 7 [PH] (ref 5–8)
PLATELET # BLD AUTO: 280 K/UL (ref 150–450)
PMV BLD AUTO: 10 FL (ref 9.2–12.9)
POTASSIUM SERPL-SCNC: 4 MMOL/L (ref 3.5–5.1)
PROT SERPL-MCNC: 6.6 G/DL (ref 6–8.4)
PROT UR QL STRIP: NEGATIVE
RBC # BLD AUTO: 4.27 M/UL (ref 4–5.4)
SODIUM SERPL-SCNC: 143 MMOL/L (ref 136–145)
SP GR UR STRIP: 1 (ref 1–1.03)
URN SPEC COLLECT METH UR: ABNORMAL
WBC # BLD AUTO: 5.36 K/UL (ref 3.9–12.7)

## 2023-07-25 PROCEDURE — 80053 COMPREHEN METABOLIC PANEL: CPT | Performed by: NURSE PRACTITIONER

## 2023-07-25 PROCEDURE — 99223 PR INITIAL HOSPITAL CARE,LEVL III: ICD-10-PCS | Mod: ,,, | Performed by: HOSPITALIST

## 2023-07-25 PROCEDURE — 81003 URINALYSIS AUTO W/O SCOPE: CPT | Performed by: PHYSICIAN ASSISTANT

## 2023-07-25 PROCEDURE — 86480 TB TEST CELL IMMUN MEASURE: CPT | Performed by: HOSPITALIST

## 2023-07-25 PROCEDURE — 30200315 PPD INTRADERMAL TEST REV CODE 302: Performed by: HOSPITALIST

## 2023-07-25 PROCEDURE — 87040 BLOOD CULTURE FOR BACTERIA: CPT | Performed by: HOSPITALIST

## 2023-07-25 PROCEDURE — 99223 1ST HOSP IP/OBS HIGH 75: CPT | Mod: ,,, | Performed by: HOSPITALIST

## 2023-07-25 PROCEDURE — 82728 ASSAY OF FERRITIN: CPT | Performed by: HOSPITALIST

## 2023-07-25 PROCEDURE — 81025 URINE PREGNANCY TEST: CPT | Performed by: NURSE PRACTITIONER

## 2023-07-25 PROCEDURE — 85025 COMPLETE CBC W/AUTO DIFF WBC: CPT | Performed by: NURSE PRACTITIONER

## 2023-07-25 PROCEDURE — 12000002 HC ACUTE/MED SURGE SEMI-PRIVATE ROOM

## 2023-07-25 PROCEDURE — 99285 EMERGENCY DEPT VISIT HI MDM: CPT

## 2023-07-25 PROCEDURE — 99223 PR INITIAL HOSPITAL CARE,LEVL III: ICD-10-PCS | Mod: ,,, | Performed by: PSYCHIATRY & NEUROLOGY

## 2023-07-25 PROCEDURE — 99223 1ST HOSP IP/OBS HIGH 75: CPT | Mod: ,,, | Performed by: PSYCHIATRY & NEUROLOGY

## 2023-07-25 PROCEDURE — 86580 TB INTRADERMAL TEST: CPT | Performed by: HOSPITALIST

## 2023-07-25 RX ORDER — ACETAMINOPHEN 325 MG/1
650 TABLET ORAL EVERY 4 HOURS PRN
Status: DISCONTINUED | OUTPATIENT
Start: 2023-07-25 | End: 2023-08-08 | Stop reason: HOSPADM

## 2023-07-25 RX ORDER — NALOXONE HCL 0.4 MG/ML
0.02 VIAL (ML) INJECTION
Status: DISCONTINUED | OUTPATIENT
Start: 2023-07-25 | End: 2023-08-08 | Stop reason: HOSPADM

## 2023-07-25 RX ORDER — AMOXICILLIN 250 MG
1 CAPSULE ORAL 2 TIMES DAILY
Status: DISCONTINUED | OUTPATIENT
Start: 2023-07-25 | End: 2023-08-08 | Stop reason: HOSPADM

## 2023-07-25 RX ORDER — GLUCAGON 1 MG
1 KIT INJECTION
Status: DISCONTINUED | OUTPATIENT
Start: 2023-07-25 | End: 2023-08-08 | Stop reason: HOSPADM

## 2023-07-25 RX ORDER — IBUPROFEN 200 MG
16 TABLET ORAL
Status: DISCONTINUED | OUTPATIENT
Start: 2023-07-25 | End: 2023-08-08 | Stop reason: HOSPADM

## 2023-07-25 RX ORDER — IBUPROFEN 200 MG
24 TABLET ORAL
Status: DISCONTINUED | OUTPATIENT
Start: 2023-07-25 | End: 2023-08-08 | Stop reason: HOSPADM

## 2023-07-25 RX ORDER — SODIUM CHLORIDE 0.9 % (FLUSH) 0.9 %
10 SYRINGE (ML) INJECTION EVERY 12 HOURS PRN
Status: DISCONTINUED | OUTPATIENT
Start: 2023-07-25 | End: 2023-08-08 | Stop reason: HOSPADM

## 2023-07-25 RX ORDER — TALC
6 POWDER (GRAM) TOPICAL NIGHTLY PRN
Status: DISCONTINUED | OUTPATIENT
Start: 2023-07-25 | End: 2023-08-08 | Stop reason: HOSPADM

## 2023-07-25 RX ORDER — POLYETHYLENE GLYCOL 3350 17 G/17G
17 POWDER, FOR SOLUTION ORAL DAILY
Status: DISCONTINUED | OUTPATIENT
Start: 2023-07-26 | End: 2023-08-08 | Stop reason: HOSPADM

## 2023-07-25 RX ORDER — ENOXAPARIN SODIUM 100 MG/ML
40 INJECTION SUBCUTANEOUS EVERY 24 HOURS
Status: DISCONTINUED | OUTPATIENT
Start: 2023-07-25 | End: 2023-08-08 | Stop reason: HOSPADM

## 2023-07-25 RX ADMIN — TUBERCULIN PURIFIED PROTEIN DERIVATIVE 5 UNITS: 5 INJECTION, SOLUTION INTRADERMAL at 09:07

## 2023-07-25 NOTE — ED TRIAGE NOTES
Thania Carroll, a 38 y.o. female presents to the ED w/ complaint of decline in health over the last 6-8 months.     Pt is only able to say yes or no at this time.     Triage note:  Chief Complaint   Patient presents with    Multiple complaints     Declining over past 6-8 months, not able to speak, limited use of hands and feet, told by neurology to come to er today for admission for further testing     Review of patient's allergies indicates:  No Known Allergies  No past medical history on file.      LOC/ APPEARANCE: The patient is alert and following commands. Pt is only able to say yes or no. Pt changed into hospital gown. Continuous cardiac monitor, cont pulse ox, and auto BP cuff applied to patient. Pt is clean and well groomed. No JVD visible. Pt reports pain level of 0. Pt updated on POC. Bed low and locked with side rails up x2, call bell in pt reach.  SKIN: Skin is warm dry and intact, and color is consistent with ethnicity. No breakdown or brusing visible. Mucus membranes moist, acyanotic.  RESPIRATORY: Airway is open and patent. Respirations-spontaneous, unlabored, regular rate, equal bilaterally on inspiration and expiration. No accessory muscle use noted.   CARDIAC: Patient has regular heart rate.  No peripheral edema noted, and patient has no c/o chest pain. Peripheral pulses present equal and strong throughout.  ABDOMEN: Soft and non-tender to palpation with no distention noted. Pt has no complaints of abnormal bowel movements, denies blood in stool. Pt reports normal appetite.   NEUROLOGIC: Eyes open spontaneously and facial expression symmetrical. Pt behavior appropriate to situation, and pt follows commands.   MUSCULOSKELETAL: very limited movement to upper extremities, left side stronger than right. Unsteady gait but can walk.    : No complaints of frequency, burning, urgency or blood in the urine. No complaints of incontinence.

## 2023-07-25 NOTE — ED PROVIDER NOTES
Encounter Date: 7/25/2023       History     Chief Complaint   Patient presents with    Multiple complaints     Declining over past 6-8 months, not able to speak, limited use of hands and feet, told by neurology to come to er today for admission for further testing     38-year-old female with PMHx of Juvenile RA presents to the ED at the recommendation of her neurologist for progressive neurological deterioration over the past 8 months.  History is provided by patient's mother and  due to her condition. Her symptoms include weakness, speech changes, slowing of mentation, joint contractures of hands. She is able to communicate only through yes-no questions. Symptoms seem to have worsened with last pregnancy in 8/2023, though started prior to pregnancy. She gave birth in 5/2023 and is still breast feeding. Despite muscle weakness, she is able to ambulate though has history of several falls over the past few weeks. Her last fall was 3 days ago and she did hit her head. Denies LOC.  Denies mental health history. Denies urinary bowel incontinence, tremor, shuffling, difficulty walking,  fever, shortness of breath, cough, abdominal pain, N/V.       The history is provided by the patient.   Review of patient's allergies indicates:  No Known Allergies  History reviewed. No pertinent past medical history.  History reviewed. No pertinent surgical history.  History reviewed. No pertinent family history.     Review of Systems   Reason unable to perform ROS: ROS given by patient's mother.   Constitutional:  Negative for fever.   Respiratory:  Negative for cough.    Gastrointestinal:  Negative for diarrhea, nausea and vomiting.   Genitourinary:  Negative for dysuria.   Musculoskeletal:  Positive for gait problem.         Joint stiffness    Skin:  Negative for rash.   Neurological:  Positive for speech difficulty and weakness. Negative for tremors, syncope and facial asymmetry.     Physical Exam     Initial Vitals [07/25/23  1103]   BP Pulse Resp Temp SpO2   102/62 87 18 98.1 °F (36.7 °C) 100 %      MAP       --         Physical Exam    Nursing note and vitals reviewed.  Constitutional: She appears well-developed. No distress.   HENT:   Head: Normocephalic and atraumatic.   Eyes: Conjunctivae and EOM are normal. Pupils are equal, round, and reactive to light.   Neck: Neck supple.   Normal range of motion.  Cardiovascular:  Normal rate, regular rhythm, normal heart sounds and intact distal pulses.           Pulmonary/Chest: Breath sounds normal. No respiratory distress.   Abdominal: Abdomen is soft. There is no abdominal tenderness. There is no guarding.   Musculoskeletal:      Cervical back: Normal range of motion and neck supple.      Comments: BLE joints appear stiff. Joint contractures of beth hand joints      Neurological: She is alert. She displays no tremor. She exhibits abnormal muscle tone (bilateral. Decrease strenght in RLE compared to LLE. Weakness worse in UE compared to LE).   Alert, responsive though slow. Can only answer yes-no questions.      Skin: No rash noted.   Psychiatric: Her speech is delayed. She is slowed.   Flat affect       ED Course   Procedures  Labs Reviewed   URINALYSIS, REFLEX TO URINE CULTURE - Abnormal; Notable for the following components:       Result Value    Color, UA Colorless (*)     All other components within normal limits    Narrative:     Specimen Source->Urine   CBC W/ AUTO DIFFERENTIAL   COMPREHENSIVE METABOLIC PANEL   POCT URINE PREGNANCY          Imaging Results    None          Medications - No data to display  Medical Decision Making:   Initial Assessment:   38-year-old female with PMHx of Juvenile RA presents to the ED at the recommendation of her neurologist for progressive neurological deterioration  over the past 8 months. Generalized weakness observed. Slow to respond and can only answer yes-no questions. Joints of hands appear contracted. Follows commands though limited due to  weakness. Will plan to obtain labs and admit for further management by  and neurology.   Differential Diagnosis:   Autoimmune d/o, neuromuscular disorder, CVA,   Clinical Tests:   Lab Tests: Ordered and Reviewed  Radiological Study: Ordered  ED Management:  - Spoke with Neurology team who is aware of patient. Will plan to admit to  for inpatient neurology consult. Plan for ID consult prior to repeat LP and IV steroids/IVIG per neurology clinic note  - UA without signs of infection.   - No leukocytosis. H&H stable. No significant electrolyte disturbance   - Outpatient Neurology note Recommends ID consult prior to LP  - CTH ordered due to recent falls, though patient's family declined per nursing staff  - Will admit to  medicine at this time for further management   Other:   I discussed test(s) with the performing physician.           ED Course as of 07/25/23 1540   Tue Jul 25, 2023   1518 NITRITE UA: Negative []   1518 Leukocytes, UA: Negative []   1518 Preg Test, Ur: Negative []   1518 Hemoglobin: 13.2 []   1518 Hematocrit: 39.6 []   1518 WBC: 5.36 []   1518 Sodium: 143 [HM]   1518 Potassium: 4.0 []      ED Course User Index  [] Mikayla Feliz PA-C                 Clinical Impression:   Final diagnoses:  [R25.8] Bradykinesia (Primary)  [R53.1] Generalized weakness  [G20] Parkinsonism, unspecified Parkinsonism type        ED Disposition Condition    Observation Stable                Mikayla Feliz PA-C  07/25/23 6012

## 2023-07-25 NOTE — FIRST PROVIDER EVALUATION
Emergency Department TeleTriage Encounter Note      CHIEF COMPLAINT    Chief Complaint   Patient presents with    Multiple complaints     Declining over past 6-8 months, not able to speak, limited use of hands and feet, told by neurology to come to er today for admission for further testing       VITAL SIGNS   Initial Vitals [07/25/23 1103]   BP Pulse Resp Temp SpO2   102/62 87 18 98.1 °F (36.7 °C) 100 %      MAP       --            ALLERGIES    Review of patient's allergies indicates:  No Known Allergies    PROVIDER TRIAGE NOTE  This is a teletriage evaluation of a 38 y.o. female presenting to the ED complaining of referral to ED for admission by neurology.     Initial orders will be placed and care will be transferred to an alternate provider when patient is roomed for a full evaluation. Any additional orders and the final disposition will be determined by that provider.         ORDERS  Labs Reviewed   HIV 1 / 2 ANTIBODY   HEPATITIS C ANTIBODY       ED Orders (720h ago, onward)      Start Ordered     Status Ordering Provider    07/25/23 1244 07/25/23 1243  Comprehensive metabolic panel  STAT         Ordered KASEY BOBO N.    07/25/23 1244 07/25/23 1243  Insert Saline lock IV  Once         Ordered KASEY BOBO N.    07/25/23 1244 07/25/23 1243  POCT urine pregnancy  Once         Ordered KASEY BOBO N.    07/25/23 1244 07/25/23 1243  Possible Hospitalization  Once        Comments: Referred to ED by neurology for admission for AMS.    Ordered KASEY BOBO N.    07/25/23 1243 07/25/23 1243  CBC auto differential  STAT         Ordered KASEY BOBO N.    07/25/23 1105 07/25/23 1105  HIV 1/2 Ag/Ab (4th Gen)  STAT         Acknowledged LILO RIBEIRO    07/25/23 1105 07/25/23 1105  Hepatitis C Antibody  STAT         Acknowledged LILO RIBEIRO              Virtual Visit Note: The provider triage portion of this emergency department evaluation and  documentation was performed via [employer], a HIPAA-compliant telemedicine application, in concert with a tele-presenter in the room. A face to face patient evaluation with one of my colleagues will occur once the patient is placed in an emergency department room.      DISCLAIMER: This note was prepared with [employer] voice recognition transcription software. Garbled syntax, mangled pronouns, and other bizarre constructions may be attributed to that software system.

## 2023-07-25 NOTE — ASSESSMENT & PLAN NOTE
Uncertain etiology  Admitted for concerns of Parkinsonism/bradykinesia or other neurodenegerative vs autoimmune disorder.  - frontal lobe dysfunction, PBA, saccadic intrusions and blink to initiate saccades   - not clearly better with pregnancy (in fact they think it got worse)    ]Admit to Hospital  - Consult Neurology: they will arrange LP, lab is ordered.  - PT/OT/ST   - no clear improvement with just solumedrol x 5 days   -- plan for inpatient IVIG + solumedrol X 3-5 days as well as repeat LP with encephalopathy panel (send to [place]) + movement panel to rule out NMDAr encephalitis and 14-3-3, total tau RT-QuIC   - LP PRIOR TO IVIG AND SOLUMEDROL   - neurofilament light chain   - consult ID prior to LP   - EMG -- PSP/PLS? FTD/ALS?   - consider CT chest/abd/pelv vs transvaginal ultrasound     -added quantiferin gold and PPD.

## 2023-07-25 NOTE — H&P
[facility] - Emergency Dept  [facility] Medicine  History & Physical    Patient Name: Thania Carroll  MRN: [id number]  Patient Class: OP- Observation  Admission Date: 7/25/2023  Attending Physician: Nora Adams MD  Primary Care Provider: Primary Doctor No         Patient information was obtained from patient, parent and ER records.     Subjective:     Principal Problem:Neurodegenerative disorder    Chief Complaint:   Chief Complaint   Patient presents with    Multiple complaints     Declining over past 6-8 months, not able to speak, limited use of hands and feet, told by neurology to come to er today for admission for further testing        HPI: 38 y.o. female with a PMH of  Juvenile RA, 2 pregnancies, catatonia, who presents for bradykinesia, apathy and symptoms suggestive of progressive systemic illness ice May, 2022.  She was sent to ED for admission by her Neurology PA, Nohemy Brady PA-C and Harshal Lyons MD.  See her note for serologic studies done in June, 2023. GISEL + 1: 640.  Pt lives in [place], [place]. She quit speaking 3 months ago.      History is provided by patient's father due to her condition. Her mother is the primary caregiver and she stepped out of the ED. Her  had to go back to [place], [place]. Her father reports progressive neurological deterioration over the past 8 months.  Her symptoms include weakness, speech changes, slowing of mentation, joint contractures of hands. He is unaware of weight loss. Denies SOB, chest pain andominal symptoms. She can ambulate with assistance and is cognizsant of where she want to go in the house as her family assists her.  She is able to communicate only through yes-no questions. Symptoms seem to have worsened with last pregnancy in 8/2022, though symptoms started prior to pregnancy. She gave birth in 5/2023 and is still breast feeding an eight week old baby who is here.  She has history of several falls over the past few weeks. Her last fall was 3 days  ago and she did hit her head. Denies LOC.  Denies mental health history, although was diagnosed with catatonia at [facility].  Denies urinary bowel incontinence, tremor, shuffling, difficulty walking,  fever, shortness of breath, cough, abdominal pain, N/V.  There have been no gross aspiration events, but father has notices intermittent choking spells with eating.    She was noted to have increased muscle loss yesterday in clinic. She has contractures of the DIP joints on both hands.      She first noticed that she had trouble using her hands with holding her baby in May of 2022. She told her mom about in June/July 2022. They think maybe she started slowing down early 2022. Noticed that it would take her longer to wash her hair. She had trouble orienting things, had trouble holding onto things and losing her . Then they noticed she was moving slower entirely, walking slower.  noticed that she would turn slowly as well.     The only medication she has ever been on was prednisone and methotrexate.     S/p two neurologic work ups: One at [facility] and the other in [place]  with neurologist, Dr. Franco. At [facility], she was diagnosed with catatonia. She was not pregnant at the time whenever everything started. Has two kids, 2 year and 8 weeks.  She was eferred to psychiatry for management. She has no known past psychiatric illness. She was pregnant during this evaluation. She never had a trial of benzos. She is still breast feeding. Dr. Franco did LP. She was hospitalized in January for further work up. She was told that she was healthy. They state that Dr. Franco suspected an autoimmune disorder. She did an extended EEG at the house, it was normal. They have not followed back up with Dr. Franco. Hands started to claw up at the beginning.     She noted more muscle atrophy starting in Nov 2022. Speech started changing in fall last year. Since November she has had even more of a decline. Seemed like things got worse whenever  she was pregnant (End of August). She was still driving in August 2022, although she was still very slow. 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 She started with echolalia ( meaningless repetition) in Sept/Oct as well as palilalia ( involuntary repeating words).  She would have episodes -- staring, and then come back too. Would last for a minute or two. Mom thought that her speech got better after she had the baby for a day or two. She was able to communicate just a little better. Presently all speech is shut down for the last 3 months. She responds to yes and no questions,, and is redirectable.     She started having more gait instability in June 2022 ( one year ago) and tripped over something whenever she went to check on their son. She had a few other falls in November whenever she got out of the bathtub, fell backwards. Another fall in January. She has emotional lability, Laughs and cries at odd times, which started in October/November, 2022.     She had medrol dose pack in January. No improvement.     She had a recent trial of solumedrol with no response.    She is being admitted for Neuro evaluation.    In the ED &  Clinic evaluations:  Cbc. Cmp. Cpk, lipid panel- nl  Heavy metal screen 6/2023 - nl  TFTs -nl  Movement Disorder, Autoimmune pannel, cerruloplasm, negative  NA 1:160  UPT negative  Trep Ab bucky  U tox neg  Throckmorton's D - neg  Lyme - neg  Hereditary Parkinson's - neg  CT head 1/2023  MRI head - done 6/15.23, some atrophy out of proportion for age, particularly in bilateral caudate and putamen noted by  Nohemy Brady PA-C.     Admitted for concerns of Parkinsonism/bradykinesia or other  neurodenegerative vs autoimmune disorder.  - frontal lobe dysfunction, PBA, saccadic intrusions and blink to initiate saccades   - not clearly better with pregnancy (in fact they think it got worse)  - PT/OT/ST   - no clear improvement with just solumedrol x 5 days   -- plan for  inpatient IVIG + solumedrol X 3-5 days as well as repeat LP with encephalopathy panel (send to [place]) + movement panel to rule out NMDAr encephalitis and 14-3-3, total tau RT-QuIC   - LP PRIOR TO IVIG AND SOLUMEDROL   - neurofilament light chain   - consult ID prior to LP   - EMG -- PSP/PLS? FTD/ALS?   - consider CT chest/abd/pelv vs transvaginal ultrasound                    No new subjective & objective note has been filed under this hospital service since the last note was generated.    Assessment/Plan:     * Neurodegenerative disorder  Uncertain etiology  Admitted for concerns of Parkinsonism/bradykinesia or other neurodenegerative vs autoimmune disorder.  - frontal lobe dysfunction, PBA, saccadic intrusions and blink to initiate saccades   - not clearly better with pregnancy (in fact they think it got worse)    ]Admit to Hospital  - Consult Neurology: they will arrange LP, lab is ordered.  - PT/OT/ST   - no clear improvement with just solumedrol x 5 days   -- plan for inpatient IVIG + solumedrol X 3-5 days as well as repeat LP with encephalopathy panel (send to [place]) + movement panel to rule out NMDAr encephalitis and 14-3-3, total tau RT-QuIC   - LP PRIOR TO IVIG AND SOLUMEDROL   - neurofilament light chain   - consult ID prior to LP   - EMG -- PSP/PLS? FTD/ALS?   - consider CT chest/abd/pelv vs transvaginal ultrasound     -added quantiferin gold and PPD.           Juvenile rheumatoid arthritis  STABLE  GISEL :160, autoimmune panel is negative  Rheu Factor Neg        VTE Risk Mitigation (From admission, onward)           Ordered     enoxaparin injection 40 mg  Daily         07/25/23 1802     IP VTE HIGH RISK PATIENT  Once         07/25/23 1802     Place sequential compression device  Until discontinued         07/25/23 1802                    On 07/25/2023, patient should be placed in hospital observation services under my care.    Nora Adams MD   Senior Hospitalist  Department of Hospital Medicine  [facility] - Emergency Dept

## 2023-07-25 NOTE — SUBJECTIVE & OBJECTIVE
Past Medical History:   Diagnosis Date    Catatonia     Juvenile rheumatoid arthritis        History reviewed. No pertinent surgical history.    Review of patient's allergies indicates:  No Known Allergies    No current facility-administered medications on file prior to encounter.     Current Outpatient Medications on File Prior to Encounter   Medication Sig    methylPREDNISolone sodium succinate (SOLU-MEDROL) 1,000 mg injection MIX 1g (1 SYRINGE) into a smoothie to take once daily for 5 doses     Family History    None       Tobacco Use    Smoking status: Not on file    Smokeless tobacco: Not on file   Substance and Sexual Activity    Alcohol use: Not on file    Drug use: Not on file    Sexual activity: Not on file     Review of Systems   Constitutional:  Positive for activity change. Negative for appetite change, chills, fatigue, fever and unexpected weight change.        Non verbal, tracks me with her eyes. Ambulating w assistance to the BR    HENT:  Negative for facial swelling, hearing loss, mouth sores, nosebleeds, sinus pressure, sore throat, trouble swallowing and voice change.    Eyes:  Negative for photophobia, pain, redness and itching.   Respiratory:  Negative for cough, choking, shortness of breath and wheezing.    Cardiovascular:  Negative for chest pain, palpitations and leg swelling.   Gastrointestinal:  Negative for abdominal distention, abdominal pain, blood in stool, constipation, diarrhea, nausea and vomiting.   Endocrine: Negative for polyuria.   Genitourinary:  Negative for difficulty urinating, dysuria, flank pain, frequency, hematuria, urgency, vaginal bleeding and vaginal discharge.   Musculoskeletal:  Negative for arthralgias, back pain, gait problem, joint swelling, neck pain and neck stiffness.   Skin:  Negative for rash and wound.   Neurological:  Negative for dizziness, tremors, seizures, syncope, weakness, light-headedness, numbness and headaches.   Hematological:  Does not  bruise/bleed easily.   Psychiatric/Behavioral:  Positive for confusion. Negative for agitation, behavioral problems, decreased concentration, dysphoric mood, hallucinations, sleep disturbance and suicidal ideas. The patient is nervous/anxious. The patient is not hyperactive.    Objective:     Vital Signs (Most Recent):  Temp: 98.1 °F (36.7 °C) (07/25/23 1103)  Pulse: 71 (07/25/23 1734)  Resp: 18 (07/25/23 1734)  BP: (!) 127/51 (07/25/23 1734)  SpO2: 99 % (07/25/23 1734) Vital Signs (24h Range):  Temp:  [98.1 °F (36.7 °C)] 98.1 °F (36.7 °C)  Pulse:  [63-87] 71  Resp:  [18] 18  SpO2:  [98 %-100 %] 99 %  BP: (101-127)/(51-78) 127/51     Weight: 54.4 kg (120 lb)  Body mass index is 20.6 kg/m².     Physical Exam  Constitutional:       General: She is not in acute distress.     Appearance: Normal appearance. She is ill-appearing. She is not toxic-appearing or diaphoretic.      Comments: Thin, muscle atrophy of the UE and LE. Cushingoid appearance   HENT:      Head: Atraumatic.      Right Ear: External ear normal.      Left Ear: External ear normal.      Nose: Nose normal.      Mouth/Throat:      Mouth: Mucous membranes are moist.      Pharynx: Oropharynx is clear. No posterior oropharyngeal erythema.   Eyes:      General: No scleral icterus.     Extraocular Movements: Extraocular movements intact.      Conjunctiva/sclera: Conjunctivae normal.      Pupils: Pupils are equal, round, and reactive to light.   Neck:      Vascular: No carotid bruit.   Cardiovascular:      Rate and Rhythm: Normal rate and regular rhythm.      Pulses: Normal pulses.      Heart sounds: Normal heart sounds. No murmur heard.    No friction rub. No gallop.   Pulmonary:      Effort: Pulmonary effort is normal. No respiratory distress.      Breath sounds: Normal breath sounds. No stridor. No wheezing, rhonchi or rales.   Chest:      Chest wall: No tenderness.   Abdominal:      General: Abdomen is flat. Bowel sounds are normal. There is no distension.       Palpations: Abdomen is soft. There is no mass.      Tenderness: There is no abdominal tenderness. There is no right CVA tenderness, left CVA tenderness, guarding or rebound.      Hernia: No hernia is present.   Musculoskeletal:         General: Deformity present. No swelling, tenderness or signs of injury. Normal range of motion.      Cervical back: Normal range of motion and neck supple. No rigidity or tenderness.      Right lower leg: No edema.      Left lower leg: No edema.      Comments: Contractures of the fingers: DIP joints 2-5, bilaterally w callus formation over the knuckles. I was able to open them up without discomfort elicited.   Lymphadenopathy:      Cervical: No cervical adenopathy.   Skin:     General: Skin is warm and dry.      Coloration: Skin is not jaundiced or pale.      Findings: No bruising, erythema, lesion or rash.   Neurological:      General: No focal deficit present.      Mental Status: She is alert and oriented to person, place, and time. Mental status is at baseline.      Cranial Nerves: No cranial nerve deficit.      Sensory: No sensory deficit.      Motor: No weakness.      Gait: Gait abnormal.   Psychiatric:         Attention and Perception: She is inattentive.         Mood and Affect: Mood normal.         Speech: She is noncommunicative.         Behavior: Behavior is slowed and withdrawn.         Cognition and Memory: Cognition is impaired. Memory is impaired.      Comments: Unable to assess cognition due to lack of verbal ability. She is able to express her wishes through yes/ no questions and remember the location of objects/ rooms in the house.             CRANIAL NERVES     CN III, IV, VI   Pupils are equal, round, and reactive to light.     Significant Labs: All pertinent labs within the past 24 hours have been reviewed.  CBC:   Recent Labs   Lab 07/25/23  1352   WBC 5.36   HGB 13.2   HCT 39.6        CMP:   Recent Labs   Lab 07/25/23  1352      K 4.0       CO2 27   GLU 80   BUN 11   CREATININE 0.7   CALCIUM 9.1   PROT 6.6   ALBUMIN 3.7   BILITOT 0.4   ALKPHOS 87   AST 23   ALT 40   ANIONGAP 9       Significant Imaging: I have reviewed all pertinent imaging results/findings within the past 24 hours.

## 2023-07-25 NOTE — HPI
"Ms. Carroll is a 38 year-old female with a history of juvenile rheumatoid arthritis, who presents as a direct admission from movement disorders clinic for inpatient lumbar puncture for work-up of progressively worsening behavioral changes and parkinsonism. History is obtained per discussion with movement disorders clinic staff (Nohemy Brady), chart review, and from the patient's father who is at bedside. The patient has been experiencing the above changes since 2022, and per chart review and discussion with family it appears that there was significant worsening around August and then November of that year. Initial symptoms are described by family as a "slowing of her thinking and motivation." She was slow to respond to others, less communicative, and was having more difficulties with ADLs, including taking care of her baby. As this worsened, additional motor symptoms became evident. Bradykinesia was initially predominant, affecting first the bilateral upper extremities, and then the lower extremities particularly in regard to slowing of gait and turning progressing to gait instability with falls. The distal upper extremities also became progressively weaker, leading to difficulty with motor tasks and further impairment of daily activities, and subsequent palmar muscle atrophy and bilateral "clawing" of the hands. She was become markedly less communicative over the last three or so months per family, and per chart review has been exhibiting both echolalia and palilalia since September/October of last year. Additionally has suffered from dysphagia to both solids and liquids. All of this has progressed to the point of her being unable to work, and ultimately unable to care for herself.    She originally presented to OSH for bradykinesia and apathy. Received a diagnosis of catatonia and was referred to psychiatry. Seen by neurology in [facility] in 11/2022 and was again diagnosed with catatonia. Additionally seen by " neurology in [place], and there was concern to autoimmune disorder, however was reportedly lost to follow-up. Currently follows in the [facility] movement disorders clinic. She has undergone an extensive outpatient work-up thus far, including testing for hakan's disease, hereditary forms of parkinson's disease, frontotemporal dementia, NMDA receptor encephalitis, autoimmune disease (GISEL, dsDNA, smith), Varun's disease, Sjorgen's: all of which appears to have been negative, aside from positive GISEL. Additionally had peripheral blood smear done, which was without acanthocytosis. MRI brain in 2023 demonstrates frontal and temporal lobe atrophy, as well as bilateral caudate and putamen atrophy. She has previously received 5 day course of pulse-dose steroids without any improvement. 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 Admitted to hospital medicine with neurology consultation.

## 2023-07-25 NOTE — HPI
Thania Carroll is a 38 year old white woman,  2 para 2, with juvenile rheumatoid arthritis, catatonia. She lives in [place], [place]. She is . She has two children.   She presented to [facility] on 2023. She had been having progressive neurological deterioration over the past 8 months with weakness, speech changes, slowing of mentation, joint contractures of hands. She can ambulate with assistance and was cognizant of where she wanted to go in the house as her family assisted her. She was able to communicate only through yes-no questions. Her symptoms seemed to have worsened with her last pregnancy in 2022, though symptoms started prior to it. She first noticed she had trouble using her hands when holding her baby in May 2022 and told her mother about it in  or July. Her family noticed that she started slowing down in early . She took longer to wash her hair, had trouble orienting things, had trouble holding onto things and losing her , then noticed she was moving more slowly in general. She underwent two neurologic workups, one at [facility] ([facility]) and the other in [place] with neurologist Dr. Franco. She was diagnosed with catatonia at [facility]. She was referred to Psychiatry. Dr. Franco performed lumbar puncture. She was hospitalized in 2023 for further workup. Dr. Franco suspected an autoimmune disorder. She underwent an extended electroencephalogram at home that was normal. Her hands started to contract. She started having echolalia (meaningles repetition) in  as well as palilalia (involuntary repeating words). She had staring episodes that lasted a minute or two. She noted more muscle atrophy in 2022. She delivered in May 2023 and was still breastfeeding an eight week old baby. She had no speech for the last 3 months. Had had several falls over the past week. Her last  fall was 3 days ago. She started having emotional lability in October/November 2022.  She was seen at [facility] clinic on 7/24/2023. The only medication she had ever been on was prednisone in January with no improvement, methylprednisolone with no response, and methotrexate. She was seen by Nohemy Brady PA-C and Dr. Harshal Lyons, who recommended lumbar puncture with encephalopathy panel to send to [facility] and movement panel to rule out NMDAr encephalitis and 14-3-3, total tau RT-QulC, neurofilament light chain, inpatient IVIG and methylprednisolone, electroencephalogram to evaluate for delta brush, electromyelogram. Her father noticed intermittent choking spells when eating.   Clinic and emergency department evaluations:  Cbc. Cmp. Cpk, lipid panel- nl  Heavy metal screen 6/2023 - nl  TFTs -nl  Movement Disorder, Autoimmune pannel, cerruloplasm, negative  NA 1:160  UPT negative  Trep Ab bucky  U tox neg  [place]'s D - neg  Lyme - neg  Hereditary Parkinson's - neg  CT head 1/2023  MRI head - done 6/15.23, some atrophy out of proportion for age, particularly in bilateral caudate and putamen noted by  Nohemy Brady PA-C.    She was admitted to Hospital Medicine Team N.

## 2023-07-25 NOTE — Clinical Note
Diagnosis: Bradykinesia [749901]   Future Attending Provider: FILOMENA PATRICK [[id number]]   Admitting Provider:: FILOMENA PATRICK [[id number]]

## 2023-07-25 NOTE — H&P
[facility] - Emergency Dept  [facility] Medicine  History & Physical    Patient Name: Thania Carroll  MRN: [id number]  Patient Class: OP- Observation  Admission Date: 7/25/2023  Attending Physician: Nora Adams MD  Primary Care Provider: Primary Doctor No         Patient information was obtained from patient, parent, ER records and Neurology notes.    Subjective:     Principal Problem:Neurodegenerative disorder    Chief Complaint:   Chief Complaint   Patient presents with    Multiple complaints     Declining over past 6-8 months, not able to speak, limited use of hands and feet, told by neurology to come to er today for admission for further testing        HPI: 38 y.o. female with a PMH of  Juvenile RA, 2 pregnancies, catatonia, who presents for bradykinesia, apathy and symptoms suggestive of progressive systemic illness ice May, 2022.  She was sent to ED for admission by her Neurology PA, Nohemy Brady PA-C and Harshal Lyons MD.  See her note for serologic studies done in June, 2023. GISEL + 1: 640.  Pt lives in [place], [place]. She quit speaking 3 months ago.      History is provided by patient's father due to her condition. Her mother is the primary caregiver and she stepped out of the ED. Her  had to go back to [place], [place]. Her father reports progressive neurological deterioration over the past 8 months.  Her symptoms include weakness, speech changes, slowing of mentation, joint contractures of hands. He is unaware of weight loss. Denies SOB, chest pain andominal symptoms. She can ambulate with assistance and is cognizsant of where she want to go in the house as her family assists her.  She is able to communicate only through yes-no questions. Symptoms seem to have worsened with last pregnancy in 8/2022, though symptoms started prior to pregnancy. She gave birth in 5/2023 and is still breast feeding an eight week old baby who is here.  She has history of several falls over the past few weeks. Her last  fall was 3 days ago and she did hit her head. Denies LOC.  Denies mental health history, although was diagnosed with catatonia at [facility].  Denies urinary bowel incontinence, tremor, shuffling, difficulty walking,  fever, shortness of breath, cough, abdominal pain, N/V.  There have been no gross aspiration events, but father has notices intermittent choking spells with eating.    She was noted to have increased muscle loss yesterday in clinic. She has contractures of the DIP joints on both hands.      She first noticed that she had trouble using her hands with holding her baby in May of 2022. She told her mom about in June/July 2022. They think maybe she started slowing down early 2022. Noticed that it would take her longer to wash her hair. She had trouble orienting things, had trouble holding onto things and losing her . Then they noticed she was moving slower entirely, walking slower.  noticed that she would turn slowly as well.     The only medication she has ever been on was prednisone and methotrexate.     S/p two neurologic work ups: One at [facility] and the other in [place]  with neurologist, Dr. Franco. At [facility], she was diagnosed with catatonia. She was not pregnant at the time whenever everything started. Has two kids, 2 year and 8 weeks.  She was eferred to psychiatry for management. She has no known past psychiatric illness. She was pregnant during this evaluation. She never had a trial of benzos. She is still breast feeding. Dr. Franco did LP. She was hospitalized in January for further work up. She was told that she was healthy. They state that Dr. Franco suspected an autoimmune disorder. She did an extended EEG at the [place], it was normal. They have not followed back up with Dr. Franco. Hands started to claw up at the beginning.     She noted more muscle atrophy starting in Nov 2022. Speech started changing in fall last year. Since November she has had even more of a decline. Seemed like things got  worse whenever she was pregnant (End of August). She was still driving in August 2022, although she was still very slow. 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 She started with echolalia ( meaningless repetition) in Sept/Oct as well as palilalia ( involuntary repeating words).  She would have episodes -- staring, and then come back too. Would last for a minute or two. Mom thought that her speech got better after she had the baby for a day or two. She was able to communicate just a little better. Presently all speech is shut down for the last 3 months. She responds to yes and no questions,, and is redirectable.     She started having more gait instability in June 2022 ( one year ago) and tripped over something whenever she went to check on their son. She had a few other falls in November whenever she got out of the bathtub, fell backwards. Another fall in January. She has emotional lability, Laughs and cries at odd times, which started in October/November, 2022.     She had medrol dose pack in January. No improvement.     She had a recent trial of solumedrol with no response.    She is being admitted for Neuro evaluation.    In the ED &  Clinic evaluations:  Cbc. Cmp. Cpk, lipid panel- nl  Heavy metal screen 6/2023 - nl  TFTs -nl  Movement Disorder, Autoimmune pannel, cerruloplasm, negative  NA 1:160  UPT negative  Trep Ab bucky  U tox neg  [place]'s D - neg  Lyme - neg  Hereditary Parkinson's - neg  CT head 1/2023  MRI head - done 6/15.23, some atrophy out of proportion for age, particularly in bilateral caudate and putamen noted by  Nohemy Brady PA-C.     Admitted for concerns of Parkinsonism/bradykinesia or other  neurodenegerative vs autoimmune disorder.  - frontal lobe dysfunction, PBA, saccadic intrusions and blink to initiate saccades   - not clearly better with pregnancy (in fact they think it got worse)  - PT/OT/ST   - no clear improvement with just solumedrol x 5 days    -- plan for inpatient IVIG + solumedrol X 3-5 days as well as repeat LP with encephalopathy panel (send to [place]) + movement panel to rule out NMDAr encephalitis and 14-3-3, total tau RT-QuIC   - LP PRIOR TO IVIG AND SOLUMEDROL   - neurofilament light chain   - consult ID prior to LP   - EMG -- PSP/PLS? FTD/ALS?   - consider CT chest/abd/pelv vs transvaginal ultrasound                    Past Medical History:   Diagnosis Date    Catatonia     Juvenile rheumatoid arthritis        History reviewed. No pertinent surgical history.    Review of patient's allergies indicates:  No Known Allergies    No current facility-administered medications on file prior to encounter.     Current Outpatient Medications on File Prior to Encounter   Medication Sig    methylPREDNISolone sodium succinate (SOLU-MEDROL) 1,000 mg injection MIX 1g (1 SYRINGE) into a smoothie to take once daily for 5 doses     Family History    None       Tobacco Use    Smoking status: Not on file    Smokeless tobacco: Not on file   Substance and Sexual Activity    Alcohol use: Not on file    Drug use: Not on file    Sexual activity: Not on file     Review of Systems   Constitutional:  Positive for activity change. Negative for appetite change, chills, fatigue, fever and unexpected weight change.        Non verbal, tracks me with her eyes. Ambulating w assistance to the BR    HENT:  Negative for facial swelling, hearing loss, mouth sores, nosebleeds, sinus pressure, sore throat, trouble swallowing and voice change.    Eyes:  Negative for photophobia, pain, redness and itching.   Respiratory:  Negative for cough, choking, shortness of breath and wheezing.    Cardiovascular:  Negative for chest pain, palpitations and leg swelling.   Gastrointestinal:  Negative for abdominal distention, abdominal pain, blood in stool, constipation, diarrhea, nausea and vomiting.   Endocrine: Negative for polyuria.   Genitourinary:  Negative for difficulty urinating,  dysuria, flank pain, frequency, hematuria, urgency, vaginal bleeding and vaginal discharge.   Musculoskeletal:  Negative for arthralgias, back pain, gait problem, joint swelling, neck pain and neck stiffness.   Skin:  Negative for rash and wound.   Neurological:  Negative for dizziness, tremors, seizures, syncope, weakness, light-headedness, numbness and headaches.   Hematological:  Does not bruise/bleed easily.   Psychiatric/Behavioral:  Positive for confusion. Negative for agitation, behavioral problems, decreased concentration, dysphoric mood, hallucinations, sleep disturbance and suicidal ideas. The patient is nervous/anxious. The patient is not hyperactive.    Objective:     Vital Signs (Most Recent):  Temp: 98.1 °F (36.7 °C) (07/25/23 1103)  Pulse: 71 (07/25/23 1734)  Resp: 18 (07/25/23 1734)  BP: (!) 127/51 (07/25/23 1734)  SpO2: 99 % (07/25/23 1734) Vital Signs (24h Range):  Temp:  [98.1 °F (36.7 °C)] 98.1 °F (36.7 °C)  Pulse:  [63-87] 71  Resp:  [18] 18  SpO2:  [98 %-100 %] 99 %  BP: (101-127)/(51-78) 127/51     Weight: 54.4 kg (120 lb)  Body mass index is 20.6 kg/m².     Physical Exam  Constitutional:       General: She is not in acute distress.     Appearance: Normal appearance. She is ill-appearing. She is not toxic-appearing or diaphoretic.      Comments: Thin, muscle atrophy of the UE and LE. Cushingoid appearance   HENT:      Head: Atraumatic.      Right Ear: External ear normal.      Left Ear: External ear normal.      Nose: Nose normal.      Mouth/Throat:      Mouth: Mucous membranes are moist.      Pharynx: Oropharynx is clear. No posterior oropharyngeal erythema.   Eyes:      General: No scleral icterus.     Extraocular Movements: Extraocular movements intact.      Conjunctiva/sclera: Conjunctivae normal.      Pupils: Pupils are equal, round, and reactive to light.   Neck:      Vascular: No carotid bruit.   Cardiovascular:      Rate and Rhythm: Normal rate and regular rhythm.      Pulses: Normal  pulses.      Heart sounds: Normal heart sounds. No murmur heard.    No friction rub. No gallop.   Pulmonary:      Effort: Pulmonary effort is normal. No respiratory distress.      Breath sounds: Normal breath sounds. No stridor. No wheezing, rhonchi or rales.   Chest:      Chest wall: No tenderness.   Abdominal:      General: Abdomen is flat. Bowel sounds are normal. There is no distension.      Palpations: Abdomen is soft. There is no mass.      Tenderness: There is no abdominal tenderness. There is no right CVA tenderness, left CVA tenderness, guarding or rebound.      Hernia: No hernia is present.   Musculoskeletal:         General: Deformity present. No swelling, tenderness or signs of injury. Normal range of motion.      Cervical back: Normal range of motion and neck supple. No rigidity or tenderness.      Right lower leg: No edema.      Left lower leg: No edema.      Comments: Contractures of the fingers: DIP joints 2-5, bilaterally w callus formation over the knuckles. I was able to open them up without discomfort elicited.   Lymphadenopathy:      Cervical: No cervical adenopathy.   Skin:     General: Skin is warm and dry.      Coloration: Skin is not jaundiced or pale.      Findings: No bruising, erythema, lesion or rash.   Neurological:      General: No focal deficit present.      Mental Status: She is alert and oriented to person, place, and time. Mental status is at baseline.      Cranial Nerves: No cranial nerve deficit.      Sensory: No sensory deficit.      Motor: No weakness.      Gait: Gait abnormal.   Psychiatric:         Attention and Perception: She is inattentive.         Mood and Affect: Mood normal.         Speech: She is noncommunicative.         Behavior: Behavior is slowed and withdrawn.         Cognition and Memory: Cognition is impaired. Memory is impaired.      Comments: Unable to assess cognition due to lack of verbal ability. She is able to express her wishes through yes/ no questions  and remember the location of objects/ rooms in the house.             CRANIAL NERVES     CN III, IV, VI   Pupils are equal, round, and reactive to light.     Significant Labs: All pertinent labs within the past 24 hours have been reviewed.  CBC:   Recent Labs   Lab 07/25/23  1352   WBC 5.36   HGB 13.2   HCT 39.6        CMP:   Recent Labs   Lab 07/25/23  1352      K 4.0      CO2 27   GLU 80   BUN 11   CREATININE 0.7   CALCIUM 9.1   PROT 6.6   ALBUMIN 3.7   BILITOT 0.4   ALKPHOS 87   AST 23   ALT 40   ANIONGAP 9       Significant Imaging: I have reviewed all pertinent imaging results/findings within the past 24 hours.    Assessment/Plan:     * Neurodegenerative disorder  Uncertain etiology  Admitted for concerns of Parkinsonism/bradykinesia or other neurodenegerative vs autoimmune disorder.  - frontal lobe dysfunction, PBA, saccadic intrusions and blink to initiate saccades   - not clearly better with pregnancy (in fact they think it got worse)    ]Admit to Hospital  - Consult Neurology: they will arrange LP, lab is ordered.  - PT/OT/ST   - no clear improvement with just solumedrol x 5 days   -- plan for inpatient IVIG + solumedrol X 3-5 days as well as repeat LP with encephalopathy panel (send to [place]) + movement panel to rule out NMDAr encephalitis and 14-3-3, total tau RT-QuIC   - LP PRIOR TO IVIG AND SOLUMEDROL   - neurofilament light chain   - consult ID prior to LP   - EMG -- PSP/PLS? FTD/ALS?   - consider CT chest/abd/pelv vs transvaginal ultrasound     -added quantiferin gold and PPD.           Juvenile rheumatoid arthritis  STABLE  GISEL :160, autoimmune panel is negative  Rheu Factor Neg        VTE Risk Mitigation (From admission, onward)         Ordered     enoxaparin injection 40 mg  Daily         07/25/23 1802     IP VTE HIGH RISK PATIENT  Once         07/25/23 1802     Place sequential compression device  Until discontinued         07/25/23 1802                   On 07/25/2023, patient  should be placed in hospital observation services under my care.        Nora Adams MD  Department of Hospital Medicine  [facility] - Emergency Dept

## 2023-07-25 NOTE — TELEPHONE ENCOUNTER
----- Message from Luna Willoughby sent at 7/25/2023  8:02 AM CDT -----  Regarding: Let Nohemy Caitlyn know pt is calling  Contact: [phone]  Thania Carroll calling regarding stated to let LIZ de los santos know that pt is ready to be admitted to the ED.  Pt stated Caitlyn said, let her know when this pt call and for us to khalida it as urgent.  Pt will be waiting for a call back to advise her to go to the ED. She want to know if she go through the ED is she sure 100%sure that the will admit her?  Please advise

## 2023-07-26 PROBLEM — Z78.9 BREASTFEEDING (INFANT): Status: ACTIVE | Noted: 2023-07-26

## 2023-07-26 PROBLEM — R13.12 OROPHARYNGEAL DYSPHAGIA: Status: ACTIVE | Noted: 2023-07-26

## 2023-07-26 LAB
ALBUMIN SERPL BCP-MCNC: 3.5 G/DL (ref 3.5–5.2)
ALP SERPL-CCNC: 81 U/L (ref 55–135)
ALT SERPL W/O P-5'-P-CCNC: 38 U/L (ref 10–44)
ANION GAP SERPL CALC-SCNC: 10 MMOL/L (ref 8–16)
AST SERPL-CCNC: 24 U/L (ref 10–40)
BILIRUB SERPL-MCNC: 0.5 MG/DL (ref 0.1–1)
BUN SERPL-MCNC: 12 MG/DL (ref 6–20)
CALCIUM SERPL-MCNC: 9 MG/DL (ref 8.7–10.5)
CHLORIDE SERPL-SCNC: 108 MMOL/L (ref 95–110)
CO2 SERPL-SCNC: 22 MMOL/L (ref 23–29)
CREAT SERPL-MCNC: 0.7 MG/DL (ref 0.5–1.4)
ERYTHROCYTE [DISTWIDTH] IN BLOOD BY AUTOMATED COUNT: 13.3 % (ref 11.5–14.5)
EST. GFR  (NO RACE VARIABLE): >60 ML/MIN/1.73 M^2
GLUCOSE SERPL-MCNC: 87 MG/DL (ref 70–110)
HCT VFR BLD AUTO: 39.4 % (ref 37–48.5)
HGB BLD-MCNC: 12.9 G/DL (ref 12–16)
MCH RBC QN AUTO: 30.8 PG (ref 27–31)
MCHC RBC AUTO-ENTMCNC: 32.7 G/DL (ref 32–36)
MCV RBC AUTO: 94 FL (ref 82–98)
PLATELET # BLD AUTO: 255 K/UL (ref 150–450)
PMV BLD AUTO: 9.8 FL (ref 9.2–12.9)
POTASSIUM SERPL-SCNC: 3.7 MMOL/L (ref 3.5–5.1)
PROT SERPL-MCNC: 6.1 G/DL (ref 6–8.4)
RBC # BLD AUTO: 4.19 M/UL (ref 4–5.4)
SODIUM SERPL-SCNC: 140 MMOL/L (ref 136–145)
WBC # BLD AUTO: 4.98 K/UL (ref 3.9–12.7)

## 2023-07-26 PROCEDURE — 95720 PR EEG, W/VIDEO, CONT RECORD, I&R, >12<26 HRS: ICD-10-PCS | Mod: ,,, | Performed by: PSYCHIATRY & NEUROLOGY

## 2023-07-26 PROCEDURE — 97162 PT EVAL MOD COMPLEX 30 MIN: CPT

## 2023-07-26 PROCEDURE — 92610 EVALUATE SWALLOWING FUNCTION: CPT

## 2023-07-26 PROCEDURE — 95714 VEEG EA 12-26 HR UNMNTR: CPT

## 2023-07-26 PROCEDURE — 95720 EEG PHY/QHP EA INCR W/VEEG: CPT | Mod: ,,, | Performed by: PSYCHIATRY & NEUROLOGY

## 2023-07-26 PROCEDURE — 99232 SBSQ HOSP IP/OBS MODERATE 35: CPT | Mod: ,,, | Performed by: PSYCHIATRY & NEUROLOGY

## 2023-07-26 PROCEDURE — 25500020 PHARM REV CODE 255: Performed by: HOSPITALIST

## 2023-07-26 PROCEDURE — 63600175 PHARM REV CODE 636 W HCPCS: Performed by: HOSPITALIST

## 2023-07-26 PROCEDURE — 97116 GAIT TRAINING THERAPY: CPT

## 2023-07-26 PROCEDURE — 97530 THERAPEUTIC ACTIVITIES: CPT

## 2023-07-26 PROCEDURE — 97535 SELF CARE MNGMENT TRAINING: CPT

## 2023-07-26 PROCEDURE — 95700 EEG CONT REC W/VID EEG TECH: CPT

## 2023-07-26 PROCEDURE — 80053 COMPREHEN METABOLIC PANEL: CPT | Performed by: HOSPITALIST

## 2023-07-26 PROCEDURE — 99233 PR SUBSEQUENT HOSPITAL CARE,LEVL III: ICD-10-PCS | Mod: ,,, | Performed by: HOSPITALIST

## 2023-07-26 PROCEDURE — 11000001 HC ACUTE MED/SURG PRIVATE ROOM

## 2023-07-26 PROCEDURE — 85027 COMPLETE CBC AUTOMATED: CPT | Performed by: HOSPITALIST

## 2023-07-26 PROCEDURE — 97166 OT EVAL MOD COMPLEX 45 MIN: CPT

## 2023-07-26 PROCEDURE — 99233 SBSQ HOSP IP/OBS HIGH 50: CPT | Mod: ,,, | Performed by: HOSPITALIST

## 2023-07-26 PROCEDURE — 25000003 PHARM REV CODE 250: Performed by: HOSPITALIST

## 2023-07-26 PROCEDURE — 99232 PR SUBSEQUENT HOSPITAL CARE,LEVL II: ICD-10-PCS | Mod: ,,, | Performed by: PSYCHIATRY & NEUROLOGY

## 2023-07-26 RX ADMIN — ACETAMINOPHEN 650 MG: 325 TABLET ORAL at 09:07

## 2023-07-26 RX ADMIN — IOHEXOL 75 ML: 350 INJECTION, SOLUTION INTRAVENOUS at 03:07

## 2023-07-26 RX ADMIN — ENOXAPARIN SODIUM 40 MG: 40 INJECTION SUBCUTANEOUS at 06:07

## 2023-07-26 NOTE — PROGRESS NOTES
"[facility]  Neurology  Progress Note    Patient Name: Thania Carroll  MRN: [id number]  Admission Date: 7/25/2023  Hospital Length of Stay: 1 days  Code Status: Full Code   Attending Provider: Nora Adams MD  Primary Care Physician: Primary Doctor No   Principal Problem:Neurodegenerative disorder    HPI:   Ms. Carroll is a 38 year-old female with a history of juvenile rheumatoid arthritis, who presents as a direct admission from movement disorders clinic for inpatient lumbar puncture for work-up of progressively worsening behavioral changes and parkinsonism. History is obtained per discussion with movement disorders clinic staff (Nohemy Brady), chart review, and from the patient's father who is at bedside. The patient has been experiencing the above changes since 2022, and per chart review and discussion with family it appears that there was significant worsening around August and then November of that year. Initial symptoms are described by family as a "slowing of her thinking and motivation." She was slow to respond to others, less communicative, and was having more difficulties with ADLs, including taking care of her baby. As this worsened, additional motor symptoms became evident. Bradykinesia was initially predominant, affecting first the bilateral upper extremities, and then the lower extremities particularly in regard to slowing of gait and turning progressing to gait instability with falls. The distal upper extremities also became progressively weaker, leading to difficulty with motor tasks and further impairment of daily activities, and subsequent palmar muscle atrophy and bilateral "clawing" of the hands. She was become markedly less communicative over the last three or so months per family, and per chart review has been exhibiting both echolalia and palilalia since September/October of last year. Additionally has suffered from dysphagia to both solids and liquids. All of this has " progressed to the point of her being unable to work, and ultimately unable to care for herself.    She originally presented to OSH for bradykinesia and apathy. Received a diagnosis of catatonia and was referred to psychiatry. Seen by neurology in [facility] in 11/2022 and was again diagnosed with catatonia. Additionally seen by neurology in [place], and there was concern to autoimmune disorder, however was reportedly lost to follow-up. Currently follows in the [facility] movement disorders clinic. She has undergone an extensive outpatient work-up thus far, including testing for hakan's disease, hereditary forms of parkinson's disease, frontotemporal dementia, NMDA receptor encephalitis, autoimmune disease (GISEL, dsDNA, smith), Varun's disease, Sjorgen's: all of which appears to have been negative, aside from positive GISEL. Additionally had peripheral blood smear done, which was without acanthocytosis. MRI brain in 2023 demonstrates frontal and temporal lobe atrophy, as well as bilateral caudate and putamen atrophy. She has previously received 5 day course of pulse-dose steroids without any improvement. 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 Admitted to hospital medicine with neurology consultation.      Overview/Hospital Course:  No notes on file      Review of Systems   Unable to perform ROS: Mental status change   Objective:     Vital Signs (Most Recent):  Temp: 98.2 °F (36.8 °C) (07/26/23 0745)  Pulse: 68 (07/26/23 0745)  Resp: 16 (07/26/23 0745)  BP: 103/61 (07/26/23 0745)  SpO2: 98 % (07/26/23 0745) Vital Signs (24h Range):  Temp:  [98 °F (36.7 °C)-98.6 °F (37 °C)] 98.2 °F (36.8 °C)  Pulse:  [60-87] 68  Resp:  [16-18] 16  SpO2:  [98 %-100 %] 98 %  BP: ()/(51-78) 103/61     Weight: 54.4 kg (120 lb)  Body mass index is 20.6 kg/m².      Physical Exam   Neurological Exam:  MENTAL STATUS  Level of consciousness: alert  Orientation: unable to answer orientation questions  Moves all four extremities spontaneously.  Blinks to threat.  Occasionally verbalizes.  Able to follow commands, including blinking, getting up to walk.  No startle myoclonus.    CRANIAL NERVES  CN III, IV, VI: PERRL, EOMI  CN V: facial sensation intact, muscles of mastication intact  CN VII: hypomimia  CN IX, X: speech, when present, is soft and monotone    MOTOR EXAM  Muscle bulk: generally diminished, though this is most evident in the bilateral upper extremities, with profound palmar atrophy  Muscle tone: rigidity, most prominent in the bilateral upper extremities (distal >proximal); no spasticity    Strength - Upper Extremities   Arm abduction Elbow flexion Elbow extension Wrist flexion Wrist extension Finger abduction   Right 4/5 4/5 4/5 4/5 4/5 3/5   Left 4/5 4/5 4/5 4/5 4/5 3/5     Strength - Lower Extremities   Hip flexion Knee flexion Knee extension Dorsiflexion Plantarflexion   Right 5/5 5/5 5/5 5/5 5/5   Left 5/5 5/5 5/5 5/5 5/5     REFLEXES   Biceps Triceps Brachioradialis Patellar Achilles   Right +2 +2 +2 +2 +2   Left +2 +2 +2 +2 +2     Planter reflex: down-going bilaterally    SENSORY EXAM  Withdraws to pain in all four extremities. Assessment of specific modalities (vibration, pin-prick, temperature, proprioception) is limited due to mental status.    COORDINATION  Tremor: none  Gait: slow gait with reduced arm swing and slow turning.           Significant Labs: All pertinent lab results from the past 24 hours have been reviewed.    Significant Imaging: I have reviewed all pertinent imaging results/findings within the past 24 hours.    Assessment and Plan:     * Neurodegenerative disorder  This is a 38 year-old female who presents as a direct admission from movement disorders clinic for work and treatment of behavioral changes accompanied by parkinsonism and upper  extremity weakness/atrophy. This has been progressively worsening since at least August 2022. Initial changes were behavioral, with reduced motivation, slowness in thought/speech, with motor symptoms (bradykinesia, subsequent upper extremity weakness and atrophy) being evident afterwards. She has received an extensive outpatient workup for inherited neurologic disease, autoimmune disease, which aside from a positive GISEL, has been unrevealing. Previously diagnosed with catatonia. MRI brain in May 2023 reviewed by myself and Dr. Camara: shows bilateral atrophy of caudate and putamen, as well as bilateral frontal and temporal lobes. 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    Plan  -will follow up CT chest, abdomen, pelvis  ->this is to assess for mass, particularly ovarian teratoma, which may be associated with autoimmune encephalitis (especially NMDA receptor encephalitis)  ->if CT is negative, please obtain transvaginal ultrasound (may require OBGYN consult) to further assess for mass/teratoma  -continuous EEG: this is to assess for abnormalities seen in NMDA receptor encephalitis (ie delta brush) vs abnormalities seen in other conditions such as Creutzfeld-Tripp Disease  -needs LP: given patient's cognitive status, strongly recommend that this is done with IR/fluoroscopy: per primary team, is planned for tomorrow afternoon  ->CSF studies: cell count and differential, protein, glucose, cytology and flow cytometry, gram stain and culture, autoimmune encephalopathy panel (ENC2 [place] sendout panel), RT-quIC ([facility] prion center sendout lab): these have already been ordered  ->should have 32cc of CSF drawn, with opening pressure checked  ->please inform both lab and IR that patient is being tested for prion disease, as both have protocols for this  -->have discussed with ID, who do not need to  be consulted, however have recommended the above  -will plan for plasma exchange (PLEX) following LP: discussed with family today, including the patient's , and they are wanting to go forward with this  ->PLEX is not to be started until after LP is done  -needs EMG, but most likely will be done outpatient        VTE Risk Mitigation (From admission, onward)           Ordered     enoxaparin injection 40 mg  Daily         07/25/23 1802     IP VTE HIGH RISK PATIENT  Once         07/25/23 1802     Place sequential compression device  Until discontinued         07/25/23 1802                    Amaury Collins,   Neurology  [facility]

## 2023-07-26 NOTE — PROGRESS NOTES
[facility]  Hospital Medicine  Progress Note    Patient Name: Thania Carroll  MRN: [id number]  Patient Class: IP- Inpatient   Admission Date: 7/25/2023  Length of Stay: 1 days  Attending Physician: Nora Adams MD  Primary Care Provider: Primary Doctor No        Subjective:     Principal Problem:Neurodegenerative disorder        HPI:  38 y.o. female with a PMH of  Juvenile RA, 2 pregnancies, catatonia, who presents for bradykinesia, apathy and symptoms suggestive of progressive systemic illness ice May, 2022.  She was sent to ED for admission by her Neurology PA, Nohemy Brady PA-C and Harshal Lyons MD.  See her note for serologic studies done in June, 2023. GISEL + 1: 640.  Pt lives in [place], [place]. She quit speaking 3 months ago.      History is provided by patient's father due to her condition. Her mother is the primary caregiver and she stepped out of the ED. Her  had to go back to [place]. Her father reports progressive neurological deterioration over the past 8 months.  Her symptoms include weakness, speech changes, slowing of mentation, joint contractures of hands. He is unaware of weight loss. Denies SOB, chest pain andominal symptoms. She can ambulate with assistance and is cognizsant of where she want to go in the house as her family assists her.  She is able to communicate only through yes-no questions. Symptoms seem to have worsened with last pregnancy in 8/2022, though symptoms started prior to pregnancy. She gave birth in 5/2023 and is still breast feeding an eight week old baby who is here.  She has history of several falls over the past few weeks. Her last fall was 3 days ago and she did hit her head. Denies LOC.  Denies mental health history, although was diagnosed with catatonia at [facility].  Denies urinary bowel incontinence, tremor, shuffling, difficulty walking,  fever, shortness of breath, cough, abdominal pain, N/V.  There have been no gross aspiration events, but  father has notices intermittent choking spells with eating.    She was noted to have increased muscle loss yesterday in clinic. She has contractures of the DIP joints on both hands.      She first noticed that she had trouble using her hands with holding her baby in May of 2022. She told her mom about in June/July 2022. They think maybe she started slowing down early 2022. Noticed that it would take her longer to wash her hair. She had trouble orienting things, had trouble holding onto things and losing her . Then they noticed she was moving slower entirely, walking slower.  noticed that she would turn slowly as well.     The only medication she has ever been on was prednisone and methotrexate.     S/p two neurologic work ups: One at [facility] and the other in [place]  with neurologist, Dr. Franco. At [facility], she was diagnosed with catatonia. She was not pregnant at the time whenever everything started. Has two kids, 2 year and 8 weeks.  She was eferred to psychiatry for management. She has no known past psychiatric illness. She was pregnant during this evaluation. She never had a trial of benzos. She is still breast feeding. Dr. Franco did LP. She was hospitalized in January for further work up. She was told that she was healthy. They state that Dr. Franco suspected an autoimmune disorder. She did an extended EEG at the house, it was normal. They have not followed back up with Dr. Franco. Hands started to claw up at the beginning.     She noted more muscle atrophy starting in Nov 2022. Speech started changing in fall last year. Since November she has had even more of a decline. Seemed like things got worse whenever she was pregnant (End of August). She was still driving in August 2022, although she was still very slow. 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 She started with echolalia ( meaningless repetition) in Sept/Oct as well as palilalia ( involuntary repeating words).  She  would have episodes -- staring, and then come back too. Would last for a minute or two. Mom thought that her speech got better after she had the baby for a day or two. She was able to communicate just a little better. Presently all speech is shut down for the last 3 months. She responds to yes and no questions,, and is redirectable.     She started having more gait instability in June 2022 ( one year ago) and tripped over something whenever she went to check on their son. She had a few other falls in November whenever she got out of the bathtub, fell backwards. Another fall in January. She has emotional lability, Laughs and cries at odd times, which started in October/November, 2022.     She had medrol dose pack in January. No improvement.     She had a recent trial of solumedrol with no response.    She is being admitted for Neuro evaluation.    In the ED &  Clinic evaluations:  Cbc. Cmp. Cpk, lipid panel- nl  Heavy metal screen 6/2023 - nl  TFTs -nl  Movement Disorder, Autoimmune pannel, cerruloplasm, negative  NA 1:160  UPT negative  Trep Ab bucky  U tox neg  Carpinteria's D - neg  Lyme - neg  Hereditary Parkinson's - neg  CT head 1/2023  MRI head - done 6/15.23, some atrophy out of proportion for age, particularly in bilateral caudate and putamen noted by  Nohemy Brady PA-C.     Admitted for concerns of Parkinsonism/bradykinesia or other  neurodenegerative vs autoimmune disorder.  - frontal lobe dysfunction, PBA, saccadic intrusions and blink to initiate saccades   - not clearly better with pregnancy (in fact they think it got worse)  - PT/OT/ST   - no clear improvement with just solumedrol x 5 days   -- plan for inpatient IVIG + solumedrol X 3-5 days as well as repeat LP with encephalopathy panel (send to [place]) + movement panel to rule out NMDAr encephalitis and 14-3-3, total tau RT-QuIC   - LP PRIOR TO IVIG AND SOLUMEDROL   - neurofilament light chain   - consult ID prior to LP   - EMG -- PSP/PLS? FTD/ALS?    - consider CT chest/abd/pelv vs transvaginal ultrasound                    Overview/Hospital Course:  7/26- LP w/IR.  Due to her encephalopathy, we need imaging guidance for this.  Per Neurology:   CT chest, abdomen, pelvis - ordered  ->this is to assess for mass, particularly ovarian teratoma, which may be associated with autoimmune encephalitis (especially NMDA receptor encephalitis)  ->if CT is negative, please obtain transvaginal ultrasound (may require OBGYN consult) to further assess for mass/teratoma  -continuous EEG: this is to assess for abnormalities seen in NMDA receptor encephalitis (ie delta brush) vs abnormalities seen in other conditions such as Creutzfeld-Tripp Disease    Consulted infection control and nurse communication order placed. please inform both lab and IR that patient is being tested for prion disease, as both have protocols for this.    Meliza ([id number]) is preparing for LP late tomorrow afternoon.    - CSF studies ordered: cell count and differential, protein, glucose, cytology and flow cytometry, gram stain and culture, autoimmune encephalopathy panel (ENC2 [place] sendout panel), RT-quIC ([facility] prion center sendout lab): these have already been ordered  - should have 32 cc of CSF drawn, with opening pressure checked.    -will plan for plasma exchange (PLEX) following LP, however need to discuss this further with family prior to starting  ->please do not arrange vascular access for PLEX prior to decision having been reached by family  -needs EMG, but most likely will be done outpatient          Interval History: see above    Review of Systems   Constitutional:  Positive for activity change and fatigue. Negative for appetite change, fever and unexpected weight change.   HENT:  Negative for trouble swallowing.    Eyes:  Negative for photophobia and visual disturbance.   Respiratory:  Negative for cough and shortness of breath.    Cardiovascular:  Negative for chest pain and leg swelling.    Gastrointestinal:  Negative for abdominal pain, constipation, diarrhea, nausea and vomiting.   Endocrine: Negative for cold intolerance and heat intolerance.   Genitourinary:  Negative for difficulty urinating, flank pain and frequency.   Musculoskeletal:  Negative for arthralgias, back pain, gait problem and neck pain.   Neurological:  Positive for speech difficulty. Negative for seizures, light-headedness, numbness and headaches.   Psychiatric/Behavioral:  Positive for decreased concentration. Negative for behavioral problems.    Objective:     Vital Signs (Most Recent):  Temp: 98.2 °F (36.8 °C) (07/26/23 0745)  Pulse: 68 (07/26/23 0745)  Resp: 16 (07/26/23 0745)  BP: 103/61 (07/26/23 0745)  SpO2: 98 % (07/26/23 0745) Vital Signs (24h Range):  Temp:  [98 °F (36.7 °C)-98.6 °F (37 °C)] 98.2 °F (36.8 °C)  Pulse:  [60-87] 68  Resp:  [16-18] 16  SpO2:  [98 %-100 %] 98 %  BP: ()/(51-78) 103/61     Weight: 54.4 kg (120 lb)  Body mass index is 20.6 kg/m².  No intake or output data in the 24 hours ending 07/26/23 1027      Physical Exam  Constitutional:       General: She is not in acute distress.     Appearance: Normal appearance. She is ill-appearing.      Comments: Cushingoid   HENT:      Head: Normocephalic and atraumatic.      Nose: Nose normal.      Mouth/Throat:      Mouth: Mucous membranes are moist.   Eyes:      General: No scleral icterus.     Extraocular Movements: Extraocular movements intact.      Pupils: Pupils are equal, round, and reactive to light.   Cardiovascular:      Rate and Rhythm: Normal rate and regular rhythm.      Pulses: Normal pulses.      Heart sounds: Normal heart sounds.   Pulmonary:      Effort: Pulmonary effort is normal.      Breath sounds: Normal breath sounds. No wheezing or rhonchi.   Chest:      Chest wall: No tenderness.   Abdominal:      General: Abdomen is flat. Bowel sounds are normal. There is no distension.      Palpations: Abdomen is soft.      Tenderness: There is no  abdominal tenderness. There is no right CVA tenderness, left CVA tenderness, guarding or rebound.   Musculoskeletal:         General: No swelling, tenderness, deformity or signs of injury. Normal range of motion.      Cervical back: Normal range of motion and neck supple. No rigidity or tenderness.   Skin:     General: Skin is warm and dry.      Coloration: Skin is not jaundiced or pale.      Findings: No erythema or rash.   Neurological:      General: No focal deficit present.      Mental Status: She is alert and oriented to person, place, and time. Mental status is at baseline.      Cranial Nerves: No cranial nerve deficit.      Motor: No weakness.   Psychiatric:         Attention and Perception: She is inattentive.         Mood and Affect: Mood normal. Affect is blunt.         Speech: She is noncommunicative. Speech is delayed.         Behavior: Behavior is slowed and withdrawn.           Significant Labs: All pertinent labs within the past 24 hours have been reviewed.  CBC:   Recent Labs   Lab 07/25/23  1352 07/26/23  0346   WBC 5.36 4.98   HGB 13.2 12.9   HCT 39.6 39.4    255     CMP:   Recent Labs   Lab 07/25/23  1352 07/26/23  0346    140   K 4.0 3.7    108   CO2 27 22*   GLU 80 87   BUN 11 12   CREATININE 0.7 0.7   CALCIUM 9.1 9.0   PROT 6.6 6.1   ALBUMIN 3.7 3.5   BILITOT 0.4 0.5   ALKPHOS 87 81   AST 23 24   ALT 40 38   ANIONGAP 9 10       Significant Imaging: I have reviewed all pertinent imaging results/findings within the past 24 hours.      Assessment/Plan:      * Neurodegenerative disorder  Uncertain etiology  Admitted for concerns of Parkinsonism/bradykinesia or other neurodenegerative vs autoimmune disorder.  - frontal lobe dysfunction, PBA, saccadic intrusions and blink to initiate saccades   - not clearly better with pregnancy (in fact they think it got worse)    ]Admit to Hospital  - Consult Neurology: they will arrange LP, lab is ordered.  - PT/OT/ST   - no clear improvement  with just solumedrol x 5 days   -- plan for inpatient IVIG + solumedrol X 3-5 days as well as repeat LP with encephalopathy panel (send to [place]) + movement panel to rule out NMDAr encephalitis and 14-3-3, total tau RT-QuIC   - LP PRIOR TO IVIG AND SOLUMEDROL   - neurofilament light chain   - consult ID prior to LP   - EMG -- PSP/PLS? FTD/ALS?   - consider CT chest/abd/pelv vs transvaginal ultrasound     -added quantiferin gold and PPD.     7/26- Neuro and ID consulted.  LP w/ flouro.  Due to her encephalopathy, we need imaging guidance for this. Prion work-up  CT ch/a/p - pending  cEEG- ordered  LP- discussed w Flouro  Consluted Infection Control- notify all parties (lab, infection control etc) of potential prion work up.   Consulted ID  Pt is still breastfeeding          Juvenile rheumatoid arthritis  STABLE  GISEL :160, autoimmune panel is negative  Rheu Factor Neg      Oropharyngeal dysphagia  SLP eval:   Pt and family educated on aspiration precautions.  Family apprehensive about a speech evaluation on 7/26.        Breastfeeding (infant)  8 week old baby  Called and discussed case with the [facility]:  - I discussed breastfeeding risk of prion disease w ID earlier today.  - informed mother and father at bedside that there is little to nil information about prion disease and breastfeeding.  - Nursing supervisor to find safe room to feed baby.  Pt has a right to breastfeed.  - I will recommend the prudent approach to pump and discard until diagnosis made (prion disease ruled out). The LP is planned late on 7/27 and may take days to weeks to get results back.  - slow lactation wean if pt prefers to stop breastfeeding. Can use NSAID for engorgement, monitor for mastitis.   - equipment for pumping can be obtained in PICU  - can call the [facility] [phone] if positive for prion disease.      VTE Risk Mitigation (From admission, onward)         Ordered     enoxaparin injection 40 mg  Daily          07/25/23 1802     IP VTE HIGH RISK PATIENT  Once         07/25/23 1802     Place sequential compression device  Until discontinued         07/25/23 1802                Discharge Planning   ALANNA:      Code Status: Full Code   Is the patient medically ready for discharge?:     Reason for patient still in hospital (select all that apply): Patient trending condition  Discharge Plan A: Home, Home with family   Discharge Delays: None known at this time      Nora Adams MD  Department of Hospital Medicine   [facility] - Emergency Dept

## 2023-07-26 NOTE — PLAN OF CARE
07/26/23 0401   Post-Acute Status   Post-Acute Authorization Other   Coverage Medi share   Other Status No Post-Acute Service Needs   Discharge Delays None known at this time   Discharge Plan   Discharge Plan A Home;Home with family     Patient has a home but is living with her parents because of her inability to take of her self. Patient has a baby that's a couple of months old. According to patient's parents, patient cannot move her upper body and needs total assistance with everything. Patient can walk but is now having trouble with speaking.    Patient can return to her home safely with her parents. Patient may need OT/PT.    ASHLEY Domínguez, MSW-LMSW  Medical Social Worker/  ER Department

## 2023-07-26 NOTE — PLAN OF CARE
Problem: Adult Inpatient Plan of Care  Goal: Absence of Hospital-Acquired Illness or Injury  7/26/2023 1824 by Imelda Anguiano RN  Outcome: Ongoing, Progressing  7/26/2023 1823 by Imelda Anguiano RN  Outcome: Ongoing, Progressing     Problem: Seizure, Active Management  Goal: Absence of Seizure/Seizure-Related Injury  7/26/2023 1824 by Imelda Anguiano RN  Outcome: Ongoing, Progressing  7/26/2023 1823 by Imelda Anguiano RN  Outcome: Ongoing, Progressing     Problem: Infection  Goal: Absence of Infection Signs and Symptoms  7/26/2023 1824 by Imelda Anguiano RN  Outcome: Ongoing, Progressing  7/26/2023 1824 by Imelda Anguiano RN  Outcome: Ongoing, Progressing     POC reviewed with the patient and they verbalized understanding. Up  to BSC, family at bedside . All comments and concerns addressed. Bed locked in lowest position with bed alarm set, call light within reach. Safety precautions maintained. VSS, see flowsheets. No events this shift. Will continue to monitor for changes to POC and clinical condition.

## 2023-07-26 NOTE — PLAN OF CARE
Problem: Occupational Therapy  Goal: Occupational Therapy Goal  Description: Goals to be met by: 8/9/23     Patient will increase functional independence with ADLs by performing:    Feeding with Moderate Assistance.  UE Dressing with Moderate Assistance.  LE Dressing with Maximum Assistance.  Grooming while standing at sink with Moderate Assistance.  Toileting from toilet with Moderate Assistance for hygiene and clothing management.   Toilet transfer to toilet with Contact Guard Assistance.  Upper extremity exercise program (PROM of BUE) x20 reps, with independence.    Outcome: Ongoing, Progressing

## 2023-07-26 NOTE — PLAN OF CARE
Problem: SLP  Goal: SLP Goal  Description: Speech Language Pathology Goals  Goals expected to be met by 8/2/23    1. Pt will participate in ongoing assessment of swallow function to determine safest, least restrictive means of nutrition/hydration  2. Educate Pt and family on aspiration precautions and SLP POC    Outcome: Ongoing, Progressing     SLP Bedside Swallow Evaluation initiated. No overt S/S aspiration noted with PO trials. Risk of aspiration enhanced due to decreased strength of cough and decreased endurance. REC: Level VI dental soft textures, thin liquids, provided vital signs stable, Pt is awake/alert/attentive, upright, single bites/sips, 1:1 assistance with all PO and close monitoring for pocketing/and or S/S aspiration. Continue to monitor for signs and symptoms of aspiration and discontinue oral feeding should you notice any of the following: watery eyes, reddened facial area, wet vocal quality, increased work of breathing, change in respiratory status, increased congestion, coughing, fever and/or change in level of alertness. Findings reviewed with Pt, family, RN and MD team.

## 2023-07-26 NOTE — PT/OT/SLP EVAL
Physical Therapy Co-Evaluation and Treatment    Patient Name:  Thania Carroll   MRN:  [id number]    *co-treatment with OT  2/2 pt with potential impaired ability to tolerate 2 evaluations 2/2 medical status   Recommendations:     Discharge Recommendations: other (see comments)   Discharge Equipment Recommendations: shower chair   Barriers to discharge: None    Assessment:     Thania Carroll is a 38 y.o. female admitted with a medical diagnosis of Neurodegenerative disorder.  She presents with the following impairments/functional limitations: weakness, impaired endurance, impaired self care skills, impaired functional mobility, gait instability, impaired balance, decreased upper extremity function, decreased ROM, impaired cognition, impaired fine motor, abnormal tone, decreased lower extremity function. Summarized HPI listed below. Upon evaluation today, pt presents awake and alert, makes visual contact with therapists, will demo' active head turns to track but does not track with eyes only. She was reluctant to verbally interact, even with yes/no. She was able to follow all commands, demo's significant restriction in B UE with shoulders, elbows, wrists, and fingers contracted, has some mild AROM  in B shoulders. She demo's good strength in B LE, able to transition to EOB with moderate assistance. She initiates standing with some impulsivity, requires cuing for safety, unable to stand from lower surfaces without assistance. Once standing she can ambulate with contact guard assistance, demo'd one full LOB requiring therapist to assist with recovery. Per parents, pt has not received PT intervention since having her baby (5/2023), would benefit from continued PT intervention following discharge to address continued deficits.       HPI: 38 y.o. female with a PMH of  Juvenile RA, 2 pregnancies, catatonia, who presents for bradykinesia, apathy and symptoms suggestive of progressive systemic illness since May, 2022.  She quit  speaking 3 months ago.       Family reports progressive neurological deterioration over the past 8 months.  Her symptoms include weakness, speech changes, slowing of mentation, joint contractures of hands. She can ambulate with assistance and is cognizsant of where she want to go in the house as her family assists her.  She is able to communicate only through yes-no questions. Symptoms seem to have worsened with last pregnancy in 8/2022, though symptoms started prior to pregnancy. She gave birth in 5/2023 and is still breast feeding an eight week old baby who is here.  She has history of several falls over the past few weeks, related to LOB when standing quickly or during turns, unable to recover. Her last fall was 3 days ago and she did hit her head. Gait instability began June 2022.  She was noted to have increased muscle loss yesterday in clinic. She has contractures of the DIP joints on both hands.       She first noticed that she had trouble using her hands with holding her first baby in May of 2022. She told her mom about in June/July 2022. They think maybe she started slowing down early 2022. Noticed that it would take her longer to wash her hair. She had trouble orienting things, had trouble holding onto things and losing her . Then they noticed she was moving slower entirely, walking slower.  noticed that she would turn slowly as well.       S/p two neurologic work ups: One at [facility] and the other in [place]  with neurologist, Dr. Franco. At [facility], she was diagnosed with catatonia. She was not pregnant at the time whenever everything started. Has two kids, 2 year and 8 weeks.  She was eferred to psychiatry for management. She has no known past psychiatric illness. She is still breast feeding. Dr. Franco did LP. She was hospitalized in January for further work up. She was told that she was healthy. They state that Dr. Franco suspected an autoimmune disorder. She did an extended EEG at the [facility], it  was normal.      She noted more muscle atrophy starting in Nov 2022. Speech started changing in fall 2022. Since November 2022 she has had even more of a decline. Seemed like things got worse whenever she was pregnant (End of August). She was still driving in August 2022, although she was still very slow. 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 She started with echolalia ( meaningless repetition) in Sept/Oct as well as palilalia ( involuntary repeating words).  She would have episodes of staring that would last for a minute or two. Mom thought that her speech got better after she had the baby for a day or two. She was able to communicate just a little better. Presently all speech has regressed in the last 3 months, will only answer yes/no questions, difficult to understand. Emotional lability started in October/November 2022.          Rehab Prognosis: Good; patient would benefit from acute skilled PT services to address these deficits and reach maximum level of function.    Recent Surgery: * No surgery found *      Plan:     During this hospitalization, patient to be seen 4 x/week to address the identified rehab impairments via gait training, therapeutic activities, therapeutic exercises, neuromuscular re-education and progress toward the following goals:    Plan of Care Expires:  08/26/23    Subjective     Chief Complaint: weakness   Patient/Family Comments/goals: to get better   Pain/Comfort:  Pain Rating 1:  (did not report, did not demo' any signs of pain)    Patients cultural, spiritual, Yarsanism conflicts given the current situation: no    Living Environment:  Pt lives with her spouse, mother, father, and 2 children (3 yo and 8 wk) in a [facility] with no [facility].   Prior to admission, patients level of function was able to ambulate with stand by assistance and CGA for safety, requires moderate-maximum assistance for ADLs. Pt with multiple falls related to LOB when quickly standing or  turning.  Equipment used at home: none.  DME owned (not currently used): none.  Upon discharge, patient will have assistance from family.    Objective:     Communicated with RN prior to session.  Patient found HOB elevated with  (none)  upon PT entry to room.    General Precautions: Standard, fall  Orthopedic Precautions:N/A   Braces: N/A  Respiratory Status: Room air    Exams:  Cognitive Exam:  Patient is awake and alert, followed all commands, did not verbalize except for yes/no 2x (~8 attempts at yes/no)   Gross Motor Coordination:  mild impairment  RLE ROM: WFL  RLE Strength: WFL  LLE ROM: WFL  LLE Strength: WFL    Functional Mobility:  Bed Mobility:     Supine to Sit: moderate assistance  Sit to Supine: moderate assistance  Transfers:     Sit to Stand: 2x from EOB, 1x from toilet with minimum assistance, 2x from sofa with CGA, multiple failed attempts to stand.   Gait: Pt ambulated 2x 150 ft with no AD and contact guard assistance. Pt with normal gait speed, normal step size, one R lateral LOB requiring minimum assistance to safely recover.       AM-PAC 6 CLICK MOBILITY  Total Score:17       Treatment & Education:  Pt educated on role of PT/POC. Pt demo'd understanding.   Education provided to parents regarding encouraging PROM and stretching to B UE. PT discussed potential use of gait belt when pt walking to assist with safety.   Pt encouraged to only perform OOB mobility with assistance from nursing/therapy or family. Family agreeable.   Pt encouraged to ambulate daily with assistance/supervision from nursing/therapy or family. Family agreeable.          Patient left HOB elevated with all lines intact, call button in reach, and RN notified.    GOALS:   Multidisciplinary Problems       Physical Therapy Goals          Problem: Physical Therapy    Goal Priority Disciplines Outcome Goal Variances Interventions   Physical Therapy Goal     PT, PT/OT Ongoing, Progressing     Description: Goals to be met by: 8/9/2023      Patient will increase functional independence with mobility by performin. Supine to sit with Stand-by Assistance  2. Sit to stand from bedside chair with Supervision  3. Gait  x 200 feet with Supervision using No Assistive Device and no LOB  4. Ascend/descend 2 stair with no Handrails Stand-by Assistance using No Assistive Device.                          History:     Past Medical History:   Diagnosis Date    Catatonia     Juvenile rheumatoid arthritis        History reviewed. No pertinent surgical history.    Time Tracking:     PT Received On: 23  PT Start Time: 919     PT Stop Time: 941  PT Total Time (min): 22 min     Billable Minutes: Evaluation 8 mins and Gait Training 14 mins       2023

## 2023-07-26 NOTE — ASSESSMENT & PLAN NOTE
8 week old baby  Called and discussed case with the [facility]:  - I discussed breastfeeding risk of prion disease w ID earlier today.  - informed mother and father at bedside that there is little to nil information about prion disease and breastfeeding.  - Nursing supervisor to find safe room to feed baby.  Pt has a right to breastfeed.  - I will recommend the prudent approach to pump and discard until diagnosis made (prion disease ruled out). The LP is planned late on 7/27 and may take days to weeks to get results back.  - slow lactation wean if pt prefers to stop breastfeeding. Can use NSAID for engorgement, monitor for mastitis.   - equipment for pumping can be obtained in PICU  - can call the [facility] [phone] if positive for prion disease.

## 2023-07-26 NOTE — CONSULTS
"[facility] - Emergency Dept  Neurology  Consult Note    Patient Name: Thania Carroll  MRN: [id number]  Admission Date: 7/25/2023  Hospital Length of Stay: 0 days  Code Status: Full Code   Attending Provider: Nora Adams MD   Consulting Provider: Amaury Collins DO  Primary Care Physician: Primary Doctor No  Principal Problem:Neurodegenerative disorder    Inpatient consult to Neurology  Consult performed by: Amaury Collins DO  Consult ordered by: Nora Adams MD         Subjective:     Chief Complaint:  Encephalopathy with bradykinesia and upper extremity weakness/atrophy    HPI:   Ms. Carroll is a 38 year-old female with a history of juvenile rheumatoid arthritis, who presents as a direct admission from movement disorders clinic for inpatient lumbar puncture for work-up of progressively worsening behavioral changes and parkinsonism. History is obtained per discussion with movement disorders clinic staff (Nohemy Brady), chart review, and from the patient's father who is at bedside. The patient has been experiencing the above changes since 2022, and per chart review and discussion with family it appears that there was significant worsening around August and then November of that year. Initial symptoms are described by family as a "slowing of her thinking and motivation." She was slow to respond to others, less communicative, and was having more difficulties with ADLs, including taking care of her baby. As this worsened, additional motor symptoms became evident. Bradykinesia was initially predominant, affecting first the bilateral upper extremities, and then the lower extremities particularly in regard to slowing of gait and turning progressing to gait instability with falls. The distal upper extremities also became progressively weaker, leading to difficulty with motor tasks and further impairment of daily activities, and subsequent palmar muscle atrophy and bilateral "clawing" of the hands. She was become markedly " less communicative over the last three or so months per family, and per chart review has been exhibiting both echolalia and palilalia since September/October of last year. Additionally has suffered from dysphagia to both solids and liquids. All of this has progressed to the point of her being unable to work, and ultimately unable to care for herself.    She originally presented to [facility] for bradykinesia and apathy. Received a diagnosis of catatonia and was referred to psychiatry. Seen by neurology in [facility] in 11/2022 and was again diagnosed with catatonia. Additionally seen by neurology in [place], and there was concern to autoimmune disorder, however was reportedly lost to follow-up. Currently follows in the [facility] movement disorders clinic. She has undergone an extensive outpatient work-up thus far, including testing for hakan's disease, hereditary forms of parkinson's disease, frontotemporal dementia, NMDA receptor encephalitis, autoimmune disease (GISEL, dsDNA, smith), Varun's disease, Sjorgen's: all of which appears to have been negative, aside from positive GISEL. Additionally had peripheral blood smear done, which was without acanthocytosis. MRI brain in 2023 demonstrates frontal and temporal lobe atrophy, as well as bilateral caudate and putamen atrophy. She has previously received 5 day course of pulse-dose steroids without any improvement. 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 Admitted to hospital medicine with neurology consultation.       Past Medical History:   Diagnosis Date    Catatonia     Juvenile rheumatoid arthritis        History reviewed. No pertinent surgical history.    Review of patient's allergies indicates:  No Known Allergies    No current facility-administered medications on file prior to encounter.     Current  Outpatient Medications on File Prior to Encounter   Medication Sig    methylPREDNISolone sodium succinate (SOLU-MEDROL) 1,000 mg injection MIX 1g (1 SYRINGE) into a smoothie to take once daily for 5 doses     Family History    None       Tobacco Use    Smoking status: Not on file    Smokeless tobacco: Not on file   Substance and Sexual Activity    Alcohol use: Not on file    Drug use: Not on file    Sexual activity: Not on file     Review of Systems   Unable to perform ROS: Mental status change   Objective:     Vital Signs (Most Recent):  Temp: 98.1 °F (36.7 °C) (07/25/23 1103)  Pulse: 71 (07/25/23 1734)  Resp: 18 (07/25/23 1734)  BP: (!) 127/51 (07/25/23 1734)  SpO2: 99 % (07/25/23 1734) Vital Signs (24h Range):  Temp:  [98.1 °F (36.7 °C)] 98.1 °F (36.7 °C)  Pulse:  [63-87] 71  Resp:  [18] 18  SpO2:  [98 %-100 %] 99 %  BP: (101-127)/(51-78) 127/51     Weight: 54.4 kg (120 lb)  Body mass index is 20.6 kg/m².     Physical Exam   Neurological Exam:  MENTAL STATUS  Level of consciousness: alert  Orientation: unable to answer orientation questions  Moves all four extremities spontaneously.  Blinks to threat.  Occasionally verbalizes.  Able to follow commands, including blinking, getting up to walk.  No startle myoclonus.    CRANIAL NERVES  CN III, IV, VI: PERRL, EOMI  CN V: facial sensation intact, muscles of mastication intact  CN VII: hypomimia  CN IX, X: speech, when present, is soft and monotone    MOTOR EXAM  Muscle bulk: generally diminished, though this is most evident in the bilateral upper extremities, with profound palmar atrophy  Muscle tone: rigidity, most prominent in the bilateral upper extremities (distal >proximal); no spasticity    Strength - Upper Extremities   Arm abduction Elbow flexion Elbow extension Wrist flexion Wrist extension Finger abduction   Right 4/5 4/5 4/5 4/5 4/5 3/5   Left 4/5 4/5 4/5 4/5 4/5 3/5     Strength - Lower Extremities   Hip flexion Knee flexion Knee extension  Dorsiflexion Plantarflexion   Right 5/5 5/5 5/5 5/5 5/5   Left 5/5 5/5 5/5 5/5 5/5     REFLEXES   Biceps Triceps Brachioradialis Patellar Achilles   Right +2 +2 +2 +2 +2   Left +2 +2 +2 +2 +2     Planter reflex: down-going bilaterally    SENSORY EXAM  Withdraws to pain in all four extremities. Assessment of specific modalities (vibration, pin-prick, temperature, proprioception) is limited due to mental status.    COORDINATION  Tremor: none  Gait: slow gait with reduced arm swing and slow turning.           Significant Labs: All pertinent lab results from the past 24 hours have been reviewed.    Significant Imaging: I have reviewed all pertinent imaging results/findings within the past 24 hours.    Assessment and Plan:     * Neurodegenerative disorder  This is a 38 year-old female who presents as a direct admission from movement disorders clinic for workup and treatment of behavioral changes accompanied by parkinsonism and upper extremity weakness/atrophy. This has been progressively worsening since at least August 2022. Initial changes were behavioral, including reduced motivation, slowness in thought/speech, with motor symptoms (bradykinesia, subsequent upper extremity weakness and atrophy) being evident afterwards. She has received an extensive outpatient workup for inherited neurologic disease, autoimmune disease, which aside from a positive GISEL, has been unrevealing. Previously diagnosed with catatonia. MRI brain in May 2023 reviewed by myself and Dr. Camara: shows bilateral atrophy of caudate and putamen, as well as bilateral frontal and temporal lobes. 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    Plan  -please obtain CT chest, abdomen, pelvis  ->this is to assess for mass, particularly ovarian teratoma, which may be associated with autoimmune encephalitis (especially NMDA receptor  encephalitis)  ->if CT is negative, please obtain transvaginal ultrasound (may require OBGYN consult) to further assess for mass/teratoma  -continuous EEG: this is to assess for abnormalities seen in NMDA receptor encephalitis (ie delta brush) vs abnormalities seen in other conditions such as Creutzfeld-Tripp Disease  -needs LP: given patient's cognitive status, strongly recommend that this is done with IR  ->CSF studies: cell count and differential, protein, glucose, cytology and flow cytometry, gram stain and culture, autoimmune encephalopathy panel ([facility] sendout panel), RT-quIC ([facility] prion center sendout lab): these have already been ordered  ->should have 32cc of CSF drawn, with opening pressure checked  ->please inform both lab and IR that patient is being tested for prion disease, as both have protocols for this  -->have discussed with ID, who do not need to be consulted, however have recommended the above  -will plan for plasma exchange (PLEX) following LP, however need to discuss this further with family prior to starting  ->please do not arrange vascular access for PLEX prior to decision having been reached by family  -needs EMG, but most likely will be done outpatient        VTE Risk Mitigation (From admission, onward)         Ordered     enoxaparin injection 40 mg  Daily         07/25/23 1802     IP VTE HIGH RISK PATIENT  Once         07/25/23 1802     Place sequential compression device  Until discontinued         07/25/23 1802                Thank you for your consult. I will follow-up with patient. Please contact us if you have any additional questions.    Amaury Collins,   Neurology  [facility] - Emergency Dept

## 2023-07-26 NOTE — PLAN OF CARE
Problem: Infection  Goal: Absence of Infection Signs and Symptoms  Outcome: Ongoing, Progressing     Problem: Seizure, Active Management  Goal: Absence of Seizure/Seizure-Related Injury  Outcome: Ongoing, Progressing     Problem: Fall Injury Risk  Goal: Absence of Fall and Fall-Related Injury  Outcome: Ongoing, Progressing     Problem: Adult Inpatient Plan of Care  Goal: Absence of Hospital-Acquired Illness or Injury  Outcome: Ongoing, Progressing

## 2023-07-26 NOTE — ASSESSMENT & PLAN NOTE
This is a 38 year-old female who presents as a direct admission from movement disorders clinic for work and treatment of behavioral changes accompanied by parkinsonism and upper extremity weakness/atrophy. This has been progressively worsening since at least August 2022. Initial changes were behavioral, with reduced motivation, slowness in thought/speech, with motor symptoms (bradykinesia, subsequent upper extremity weakness and atrophy) being evident afterwards. She has received an extensive outpatient workup for inherited neurologic disease, autoimmune disease, which aside from a positive GISEL, has been unrevealing. Previously diagnosed with catatonia. MRI brain in May 2023 reviewed by myself and Dr. Camara: shows bilateral atrophy of caudate and putamen, as well as bilateral frontal and temporal lobes. 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    Plan  -please obtain CT chest, abdomen, pelvis  ->this is to assess for mass, particularly ovarian teratoma, which may be associated with autoimmune encephalitis (especially NMDA receptor encephalitis)  ->if CT is negative, please obtain transvaginal ultrasound (may require OBGYN consult) to further assess for mass/teratoma  -continuous EEG: this is to assess for abnormalities seen in NMDA receptor encephalitis (ie delta brush) vs abnormalities seen in other conditions such as Creutzfeld-Tripp Disease  -needs LP: given patient's cognitive status, strongly recommend that this is done with IR  ->CSF studies: cell count and differential, protein, glucose, cytology and flow cytometry, gram stain and culture, autoimmune encephalopathy panel ([facility] [place] sendout panel), RT-quIC ([facility]): these have already been ordered  ->should have 32cc of CSF drawn, with opening pressure checked  ->please inform both lab and IR that patient is  being tested for prion disease, as both have protocols for this  -->have discussed with ID, who do not need to be consulted, however have recommended the above  -will plan for plasma exchange (PLEX) following LP, however need to discuss this further with family prior to starting  -needs EMG, but most likely will be done outpatient

## 2023-07-26 NOTE — CONSULTS
IR consulted for an LP - please reach out to fluoro at [phone] to assess as IR does not perform LPs    May Owens PA-C  Interventional Radiology  Spectra: [phone]  7/26/2023

## 2023-07-26 NOTE — PT/OT/SLP EVAL
Occupational Therapy   Co-Evaluation/Treatment    Name: Thania Carroll  MRN: [id number]  Admitting Diagnosis: Neurodegenerative disorder  Recent Surgery: * No surgery found *      Recommendations:     Discharge Recommendations: other  Discharge Equipment Recommendations:  shower chair  Barriers to discharge:  None    Assessment:     Thania Carroll is a 38 y.o. female with a medical diagnosis of Neurodegenerative disorder.  She presents with the following performance deficits affecting function: weakness, impaired endurance, impaired sensation, impaired self care skills, impaired functional mobility, gait instability, impaired balance, impaired cognition, decreased coordination, decreased upper extremity function, decreased lower extremity function, pain, decreased safety awareness, abnormal tone, impaired fine motor, impaired coordination, decreased ROM, impaired joint extensibility.      Pt tolerated the session fairly well. Pt with a decline over the past year. She has slowly lost the function of her hands and now requires total care for all ADLs. Pt has had 2 pregnancies over the last 2 years and has noticed a decline in function after each one. At this time, she demonstrates contractures globally in BUE with with significant contractures felt at the DIP joints in B hands with the LUE more than the RUE. PROM performed in all planes with Pt only able to get to 90* sh flexion before demonstrating pain signs. Education provided to parents to perform PROM throughout the day. Ordered bilateral resting hand splints to the room for Pt to wear at night to help reduce contractures. Pt with limited AROM to BUE with Pt able to perform ~ 30* of shoulder flexion and 20* of elbow flexion on her own. She is able to follow commands, but does have impulsive tendencies to stand up unprompted. Pt does not visually track when asked, but difficult to obtain accurate visual exam. Pt able to inconsistently answer yes/no questions with a  delayed response. Pt ambulated into the bathroom where she required Mod A for toileting due to inability to manage clothing. Pt then participated in functional mobility around the unit for 2 laps with Pt able to avoid obstacles and find her way back into her room. Pt would benefit from continued skilled acute OT services in order to maximize independence and safety with ADLs and functional mobility to ensure safe return to PLOF in the least restrictive environment.     Rehab Prognosis: Fair; patient would benefit from acute skilled OT services to address these deficits and reach maximum level of function.       Plan:     Patient to be seen 3 x/week to address the above listed problems via self-care/home management, therapeutic activities, therapeutic exercises, neuromuscular re-education  Plan of Care Expires: 08/26/23  Plan of Care Reviewed with: patient, mother, father    Subjective     Chief Complaint: Tightness   Patient/Family Comments/goals: To return to PLOF    Occupational Profile:  Living Environment: Pt lives with her parents and  in a [facility] with no RUPESH and a walk-in shower.   Previous level of function: Pt has slowly lost the function of her hands and now requires total care for all ADLs. Pt has had 2 pregnancies over the last 2 years and has noticed a decline in function after each one. Currently, she requires total A for all ADLs due to inability to use BUEs. She is able to ambulate with supervision due to poor balance/impulsiveness.   Roles and Routines: Pt is a mother to a 2 year old and an 8 week old. Parents are assisting Pt in breastfeeding and caring for her children.   Equipment Used at Home: none  Assistance upon Discharge: Pt will have 24/7 assist from her family    Pain/Comfort:  Pain Rating 1: other (see comments) (not rated)  Location - Side 1: Bilateral  Location 1: shoulder (w/ flexion)  Pain Addressed 1: Reposition, Distraction, Cessation of Activity    Patients cultural, spiritual,  Gnosticism conflicts given the current situation: no    Objective:     Co-evaluation/treatment performed due to patient's multiple deficits requiring two skilled therapists to appropriately and safely assess patient's strength and endurance while facilitating functional tasks in addition to accommodating for patient's activity tolerance.     Communicated with: RN prior to session.  Patient found HOB elevated with Other (comments) (no active lines) upon OT entry to room.    General Precautions: Standard, fall  Orthopedic Precautions: N/A  Braces: N/A  Respiratory Status: Room air    Occupational Performance:    Bed Mobility:    Patient completed Rolling/Turning to Right with stand by assistance  Patient completed Scooting/Bridging with stand by assistance  Patient completed Supine to Sit with moderate assistance  Patient completed Sit to Supine with moderate assistance  Pt sat EOB with SBA-CGA due to impulsiveness     Functional Mobility/Transfers:  Patient completed Sit <> Stand Transfer with contact guard assistance  with  no assistive device - requires multiple attempts to perform   Patient completed Toilet Transfer Step Transfer technique with minimum assistance with  no AD  Functional Mobility: Pt engaging in functional mobility to simulate household/community distances with CGA and utilizing no AD in order to maximize functional activity tolerance and standing balance required for engagement in occupations of choice.    Activities of Daily Living:  Toileting: moderate assistance : For an unsuccessful void on the toilet. Pt unable to manage clothing to bring underwear/shorts over hips     Cognitive/Visual Perceptual:  Cognitive/Psychosocial Skills:     -       Follows Commands/attention:Follows one-step commands  -       Communication: impaired   -       Safety awareness/insight to disability: impaired   -       Mood/Affect/Coping skills/emotional control: Labile  Visual/Perceptual:      -Impaired  - Pt with poor  visual tracking       Physical Exam:  Balance:  Static Sitting   stand by assistance   Dynamic Sitting   contact guard assistance   Static Standing   contact guard assistance   Dynamic Standing   contact guard assistance     Upper Extremity Function:   Dominance: Right   Left UE Right UE   UE Edema None noted None noted   UE ROM PROM WFL except Sh flexion 90*  - contracted digits  PROM WFL except Sh flexion 90* - contracted digits    UE Strength Grossly 2+/5 Grossly 2+/5    Strength Poor Poor   Sensation    -       Intact    -       Intact   Fine Motor Skills:     -       Impaired  Left hand, manipulation of objects - absent     -       Impaired  Right hand, manipulation of objects poor   Gross Motor Skills:   Impaired- no functional use   Impaired- no functional use           AMPAC 6 Click ADL:  AMPAC Total Score: 6    Treatment & Education:  Therapist provided facilitation and instruction of proper body mechanics and fall prevention strategies during tasks listed above.  Instructed patient to sit in bedside chair daily to increase OOB/activity tolerance.  Instructed patient to use call light to have nursing staff assist with needs/transfers.  Discussed OT POC and answered all questions within OT scope of practice.  Whiteboard updated       Patient left HOB elevated with all lines intact, call button in reach, and parents present    GOALS:   Multidisciplinary Problems       Occupational Therapy Goals          Problem: Occupational Therapy    Goal Priority Disciplines Outcome Interventions   Occupational Therapy Goal     OT, PT/OT Ongoing, Progressing    Description: Goals to be met by: 8/9/23     Patient will increase functional independence with ADLs by performing:    Feeding with Moderate Assistance.  UE Dressing with Moderate Assistance.  LE Dressing with Maximum Assistance.  Grooming while standing at sink with Moderate Assistance.  Toileting from toilet with Moderate Assistance for hygiene and clothing  management.   Toilet transfer to toilet with Contact Guard Assistance.  Upper extremity exercise program (PROM of BUE) x20 reps, with independence.                         History:     Past Medical History:   Diagnosis Date    Catatonia     Juvenile rheumatoid arthritis        History reviewed. No pertinent surgical history.    Time Tracking:     OT Date of Treatment: 07/26/23  OT Start Time: 0920  OT Stop Time: 0942  OT Total Time (min): 22 min    Billable Minutes:Evaluation 10  Therapeutic Activity 12    7/26/2023

## 2023-07-26 NOTE — CONSULTS
Spoke with neurology providers (outpatient and inpatient)- outpatient neurology's ID consult was for potential CJD work up per neurology. Recommend notifying all parties (lab, infection control etc) of potential prion work up. Please reconsult with questions or if work up is concerning for infection.

## 2023-07-26 NOTE — PLAN OF CARE
Problem: Physical Therapy  Goal: Physical Therapy Goal  Description: Goals to be met by: 2023     Patient will increase functional independence with mobility by performin. Supine to sit with Stand-by Assistance  2. Sit to stand from bedside chair with Supervision  3. Gait  x 200 feet with Supervision using No Assistive Device and no LOB  4. Ascend/descend 2 stair with no Handrails Stand-by Assistance using No Assistive Device.     Outcome: Ongoing, Progressing     Pt evaluated and appropriate goals established.

## 2023-07-26 NOTE — SUBJECTIVE & OBJECTIVE
Past Medical History:   Diagnosis Date    Catatonia     Juvenile rheumatoid arthritis        History reviewed. No pertinent surgical history.    Review of patient's allergies indicates:  No Known Allergies    No current facility-administered medications on file prior to encounter.     Current Outpatient Medications on File Prior to Encounter   Medication Sig    methylPREDNISolone sodium succinate (SOLU-MEDROL) 1,000 mg injection MIX 1g (1 SYRINGE) into a smoothie to take once daily for 5 doses     Family History    None       Tobacco Use    Smoking status: Not on file    Smokeless tobacco: Not on file   Substance and Sexual Activity    Alcohol use: Not on file    Drug use: Not on file    Sexual activity: Not on file     Review of Systems   Unable to perform ROS: Mental status change   Objective:     Vital Signs (Most Recent):  Temp: 98.1 °F (36.7 °C) (07/25/23 1103)  Pulse: 71 (07/25/23 1734)  Resp: 18 (07/25/23 1734)  BP: (!) 127/51 (07/25/23 1734)  SpO2: 99 % (07/25/23 1734) Vital Signs (24h Range):  Temp:  [98.1 °F (36.7 °C)] 98.1 °F (36.7 °C)  Pulse:  [63-87] 71  Resp:  [18] 18  SpO2:  [98 %-100 %] 99 %  BP: (101-127)/(51-78) 127/51     Weight: 54.4 kg (120 lb)  Body mass index is 20.6 kg/m².     Physical Exam   Neurological Exam:  MENTAL STATUS  Level of consciousness: alert  Orientation: unable to answer orientation questions  Moves all four extremities spontaneously.  Blinks to threat.  Occasionally verbalizes.  Able to follow commands, including blinking, getting up to walk.  No startle myoclonus.    CRANIAL NERVES  CN III, IV, VI: PERRL, EOMI  CN V: facial sensation intact, muscles of mastication intact  CN VII: hypomimia  CN IX, X: speech, when present, is soft and monotone    MOTOR EXAM  Muscle bulk: generally diminished, though this is most evident in the bilateral upper extremities, with profound palmar atrophy  Muscle tone: rigidity, most prominent in the bilateral upper extremities (distal >proximal);  no spasticity    Strength - Upper Extremities   Arm abduction Elbow flexion Elbow extension Wrist flexion Wrist extension Finger abduction   Right 4/5 4/5 4/5 4/5 4/5 3/5   Left 4/5 4/5 4/5 4/5 4/5 3/5     Strength - Lower Extremities   Hip flexion Knee flexion Knee extension Dorsiflexion Plantarflexion   Right 5/5 5/5 5/5 5/5 5/5   Left 5/5 5/5 5/5 5/5 5/5     REFLEXES   Biceps Triceps Brachioradialis Patellar Achilles   Right +2 +2 +2 +2 +2   Left +2 +2 +2 +2 +2     Planter reflex: down-going bilaterally    SENSORY EXAM  Withdraws to pain in all four extremities. Assessment of specific modalities (vibration, pin-prick, temperature, proprioception) is limited due to mental status.    COORDINATION  Tremor: none  Gait: slow gait with reduced arm swing and slow turning.           Significant Labs: All pertinent lab results from the past 24 hours have been reviewed.    Significant Imaging: I have reviewed all pertinent imaging results/findings within the past 24 hours.

## 2023-07-26 NOTE — ASSESSMENT & PLAN NOTE
SLP jadon:   Pt and family educated on aspiration precautions.  Family apprehensive about a speech evaluation on 7/26.

## 2023-07-26 NOTE — ASSESSMENT & PLAN NOTE
Uncertain etiology  Admitted for concerns of Parkinsonism/bradykinesia or other neurodenegerative vs autoimmune disorder.  - frontal lobe dysfunction, PBA, saccadic intrusions and blink to initiate saccades   - not clearly better with pregnancy (in fact they think it got worse)    ]Admit to Hospital  - Consult Neurology: they will arrange LP, lab is ordered.  - PT/OT/ST   - no clear improvement with just solumedrol x 5 days   -- plan for inpatient IVIG + solumedrol X 3-5 days as well as repeat LP with encephalopathy panel (send to [place]) + movement panel to rule out NMDAr encephalitis and 14-3-3, total tau RT-QuIC   - LP PRIOR TO IVIG AND SOLUMEDROL   - neurofilament light chain   - consult ID prior to LP   - EMG -- PSP/PLS? FTD/ALS?   - consider CT chest/abd/pelv vs transvaginal ultrasound     -added quantiferin gold and PPD.     7/26- Neuro and ID consulted.  LP w/ flouro.  Due to her encephalopathy, we need imaging guidance for this. Prion work-up  CT ch/a/p - pending  cEEG- ordered  LP- discussed w Flouro  Consluted Infection Control- notify all parties (lab, infection control etc) of potential prion work up.   Consulted ID  Pt is still breastfeeding

## 2023-07-26 NOTE — HOSPITAL COURSE
CT chest abdomen pelvis was done to assess for mass, particularly ovarian teratoma, which may be associated with autoimmune encephalitis (especially NMDA receptor encephalitis). It showed bilateral ground glass lung attenuation but did not show an ovarian tumor, so transvaginal ultrasound was done and also showed no ovarian mass. She was put on continuous EEG to assess for abnormalities seen in NMDA receptor encephalitis (ie delta brush) vs abnormalities seen in other conditions such as Creutzfeld-Tripp Disease. Infection Control was consulted due to testing for prion disease. Fluoroscopy was consulted for lumbar puncture with opening pressure and CSF studies ordered: cell count and differential, protein, glucose, cytology and flow cytometry, gram stain and culture, autoimmune encephalopathy panel ([facility] [place] sendout panel), RT-quIC ([facility] sendout lab). Neurology planned plasma exchange (PLEX) following lumbar puncture, and electromyography, likely outpatient. Lumbar puncture was done on 7/28/2023. CSF was used for labs ordered by the physician assistant she had seen in Neurology clinic rather than the labs ordered by the inpatient consult service, which made another lumbar puncture necessary before treatment. Lumbar puncture done, central line placed and PLEX started on 8/3/23. She completed 5 treatments and was discharged home with outpatient neurology follow up.

## 2023-07-26 NOTE — PT/OT/SLP EVAL
Speech Language Pathology Evaluation  Bedside Swallow    Patient Name:  Thania Carroll   MRN:  [id number]  Admitting Diagnosis: Neurodegenerative disorder    Recommendations:                 General Recommendations:  Dysphagia therapy, further assessment of Speech, Language and Cognition as feasible  Diet recommendations:  Dental Soft, Thin   Aspiration Precautions: only when vital signs stable, 1 bite/sip at a time, Assistance with meals, Avoid talking while eating, Eliminate distractions, Feed only when awake/alert/attentive, Frequent oral care, HOB to 90 degrees, Monitor for s/s of aspiration, Small bites/sips, and Strict aspiration precautions Continue to monitor for signs and symptoms of aspiration and discontinue oral feeding should you notice any of the following: watery eyes, reddened facial area, wet vocal quality, increased work of breathing, change in respiratory status, increased congestion, coughing, fever and/or change in level of alertness.  General Precautions: Standard, aspiration, fall  Communication strategies:  provide increased time to answer, go to room if call light pushed, and use yes/no questions to clarify     Assessment:     Thania Carroll is a 38 y.o. female with an SLP diagnosis of Dysphagia.  No overt S/S aspiration with trials observed at the bedside. Risk of aspiration remains due to decreased endurance.  Should change in status occur, further objective assessment via MBSS likely warranted to determine safest, least restrictive means nutrition/hydration. Findings reviewed with MD.     History:     Past Medical History:   Diagnosis Date    Catatonia     Juvenile rheumatoid arthritis        History reviewed. No pertinent surgical history.    Social History: Provided by Parents at the bedside, per Parents Patient lives with family in [place], [place].    Prior Intubation HX:  none this admission     Modified Barium Swallow: none prior at this facility    Chest X-Rays: none recent     Prior diet:  regular textures, thin liquids per Patient's Parents at the bedside. Family further reports Patient sometimes coughs with meals, sometimes with liquids and sometimes with solids. Family also explain sometimes cough occurs more when she uses larger straw or takes larger sip.     Occupation/hobbies/homemaking: Patient's parents explains Patient has required assistance with feeding for ~ 6 mo. Due to decreased UE mobility    Subjective     SLP reviewed Pt with RN pre/post session, RN explained Pt tolerating meal trays and medications  Pt presents alert  Pt with mostly head nods, minimal vocalizations elicited     Pain/Comfort:  Pain Rating 1: other (see comments) (not rated)    Respiratory Status: Room air    Objective:     Oral Musculature Evaluation  Oral Musculature: general weakness  Dentition: present and adequate  Secretion Management: adequate  Mucosal Quality: adequate  Oral Labial Strength and Mobility: functional seal  Lingual Strength and Mobility: functional protrusion, functional lateral movement  Volitional Cough: elicited, reduced in strength  Volitional Swallow: elicited  Voice Prior to PO Intake: minimal assessment 2/2 minimal vocalizations elicited    Bedside Swallow Eval:   Consistencies Assessed:  Thin liquids : straw sips milk fed by Patient's father observed x5, straw spis water fed y clinician x2  Soft solids : bites of [place] toast cut up/moistened with syrup x4 fed by Patient's father        Oral Phase:   Prolonged mastication with adequate oral clearance    Pharyngeal Phase:   no overt clinical signs/symptoms of aspiration    Compensatory Strategies  Single bites/sips    Treatment: Pt found awake and upright on room air in chair in room eating breakfast meal tray fed by her father. Pt with mostly head nods to communicate. SLP unable to elicit vocalization via phonation of vowel sound or automatic speech tasks; however, Pt with some spontaneous speech and laughter as assessment progressed. She  willingly accepted bites/sips presented by family and SLP during assessment.  No overt S/S aspiration with PO trials. SLP unable to r/o silent aspiration at the bedside.  SLP educated Pt and family on SLP role, swallow anatomy, definition and risk of aspiration, aspiration precautions, dysphagia diets, diet recommendations and option for further, objective assessment of swallow function pending progress or should any change in swallow status occur. Pt did not  verbalize understanding 2/2 underlying decreased verbal expression.  Family verbalized understanding of ongoing SLP POC and explained they were in favor of waiting for further objective assessment at this time.  Family also requested for ongoing ST upon d/c from acute as they felt it had been helpful. No additional questions. Pt remained upright in chair with family in the room upon SLP exit. RN and MD notified of finding following session.     Goals:   Multidisciplinary Problems       SLP Goals          Problem: SLP    Goal Priority Disciplines Outcome   SLP Goal     SLP Ongoing, Progressing   Description: Speech Language Pathology Goals  Goals expected to be met by 8/2/23    1. Pt will participate in ongoing assessment of swallow function to determine safest, least restrictive means of nutrition/hydration  2. Educate Pt and family on aspiration precautions and SLP POC                         Plan:     Patient to be seen:  3 x/week   Plan of Care expires:  08/25/23  Plan of Care reviewed with:  patient, mother, father   SLP Follow-Up:  Yes       Discharge recommendations:  outpatient speech therapy       Time Tracking:     SLP Treatment Date:   07/26/23  Speech Start Time:  1046  Speech Stop Time:  1111     Speech Total Time (min):  25 min    Billable Minutes: Eval Swallow and Oral Function 14 and Self Care/Home Management Training 10    07/26/2023

## 2023-07-26 NOTE — ASSESSMENT & PLAN NOTE
This is a 38 year-old female who presents as a direct admission from movement disorders clinic for work and treatment of behavioral changes accompanied by parkinsonism and upper extremity weakness/atrophy. This has been progressively worsening since at least August 2022. Initial changes were behavioral, with reduced motivation, slowness in thought/speech, with motor symptoms (bradykinesia, subsequent upper extremity weakness and atrophy) being evident afterwards. She has received an extensive outpatient workup for inherited neurologic disease, autoimmune disease, which aside from a positive GISEL, has been unrevealing. Previously diagnosed with catatonia. MRI brain in May 2023 reviewed by myself and Dr. Camara: shows bilateral atrophy of caudate and putamen, as well as bilateral frontal and temporal lobes. 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    Plan  -please obtain CT chest, abdomen, pelvis  ->this is to assess for mass, particularly ovarian teratoma, which may be associated with autoimmune encephalitis (especially NMDA receptor encephalitis)  ->if CT is negative, please obtain transvaginal ultrasound (may require OBGYN consult) to further assess for mass/teratoma  -continuous EEG: this is to assess for abnormalities seen in NMDA receptor encephalitis (ie delta brush) vs abnormalities seen in other conditions such as Creutzfeld-Tripp Disease  -needs LP: given patient's cognitive status, strongly recommend that this is done with IR  ->CSF studies: cell count and differential, protein, glucose, cytology and flow cytometry, gram stain and culture, autoimmune encephalopathy panel ([facility] [place] sendout panel), RT-quIC ([facility]): these have already been ordered  ->should have 32cc of CSF drawn, with opening pressure checked  ->please inform both lab and IR that patient is  being tested for prion disease, as both have protocols for this  -->have discussed with ID, who do not need to be consulted, however have recommended the above  -will plan for plasma exchange (PLEX) following LP, however need to discuss this further with family prior to starting  -needs EMG, but most likely will be done outpatient

## 2023-07-26 NOTE — ED NOTES
Pt care assumed. Report received by Sophia.  Pt lying in stretcher in low and locked position and side rails raised x2. Call light, pt's belongings, and bedside table within pt's reach. . Family member x3 at bedside.

## 2023-07-26 NOTE — ED NOTES
Patient identifiers verified and correct for Thania Carroll   LOC: The patient is awake, non verbal   APPEARANCE: Patient appears comfortable and in no acute distress, patient is clean and well groomed.  SKIN: The skin is warm and dry, color consistent with ethnicity, patient has normal skin turgor and moist mucus membranes, skin intact   MUSCULOSKELETAL: Patient unable to move upper extremities, movement to lower extremities noted.   RESPIRATORY: Airway is open and patent, respirations are spontaneous, patient has a normal effort and rate, no accessory muscle use noted, O2 sats noted at 98% on room air.  CARDIAC:  Patient has a normal rate, no edema noted, capillary refill < 3 seconds.   GASTRO: Soft and non tender to palpation, no distention noted, normoactive bowel sounds present in all four quadrants.   NEURO: Pt opens eyes spontaneously, behavior appropriate to situation, follows commands, purposeful motor response noted

## 2023-07-26 NOTE — PLAN OF CARE
[facility] - Emergency Dept  Initial Discharge Assessment       Primary Care Provider: Primary Doctor No    Admission Diagnosis: Bradykinesia [R25.8]  Neurodegenerative disorder [G31.9]    Admission Date: 7/25/2023  Expected Discharge Date:     Transition of Care Barriers: (P) None    Payor: MEDI SHARE / Plan: MEDI-SHARE / Product Type: PPO /     Extended Emergency Contact Information  Primary Emergency Contact: Paulo Carroll  Address: [address] [place] [id number] [place]  Mobile Phone: [phone]  Relation: Spouse  Secondary Emergency Contact: Cindy Lopes   [place]  Mobile Phone: [phone]  Relation: Mother    Discharge Plan A: (P) Home, Home with family  Discharge Plan B: (P) Rehab      [id number] - [place], [place] - [address]  [address]  [place] [address]  Phone: [phone] Fax: [phone]    [facility] [place]  [address]  [facility] [address]  Phone: [phone] Fax: [phone]    [facility]  [address]  [place] [address]  Phone: [phone] Fax: [phone]      Initial Assessment (most recent)       Adult Discharge Assessment - 07/26/23 0358          Discharge Assessment    Assessment Type Discharge Planning Assessment (P)      Confirmed/corrected address, phone number and insurance Yes (P)      Confirmed Demographics Correct on Facesheet (P)      Source of Information patient (P)      When was your last doctors appointment? -- (P)    NO PCP    Does patient/caregiver understand observation status Yes (P)      Communicated ALANNA with patient/caregiver Yes (P)      Reason For Admission Testing Speech (P)      People in Home parent(s) (P)      Facility Arrived From: Home (P)      Do you expect to return to your current living situation? Yes (P)      Do you have help at home or someone to help you manage your care at home? Yes (P)      Who are your caregiver(s) and their  phone number(s)? Parents (P)      Prior to hospitilization cognitive status: Unable to Assess (P)      Current cognitive status: Unable to Assess (P)      Walking or Climbing Stairs ambulation difficulty, dependent;stair climbing difficulty, dependent;transferring difficulty, dependent (P)      Dressing/Bathing bathing difficulty, dependent;dressing difficulty, dependent (P)      Home Accessibility wheelchair accessible (P)      Home Layout Able to live on 1st floor (P)      Equipment Currently Used at Home none (P)      Readmission within 30 days? No (P)      Patient currently being followed by outpatient case management? No (P)      Do you currently have service(s) that help you manage your care at home? No (P)      Do you take prescription medications? No (P)      Do you have prescription coverage? Yes (P)      Coverage Medi share (P)      Do you have any problems affording any of your prescribed medications? TBD (P)      Who is going to help you get home at discharge? Parents (P)      How do you get to doctors appointments? family or friend will provide (P)      Are you on dialysis? No (P)      Do you take coumadin? No (P)      Discharge Plan A Home;Home with family (P)      Discharge Plan B Rehab (P)      DME Needed Upon Discharge  none (P)      Discharge Plan discussed with: Parent(s) (P)      Transition of Care Barriers None (P)         Physical Activity    On average, how many days per week do you engage in moderate to strenuous exercise (like a brisk walk)? 0 days (P)      On average, how many minutes do you engage in exercise at this level? 0 min (P)         Financial Resource Strain    How hard is it for you to pay for the very basics like food, housing, medical care, and heating? Not hard at all (P)         Housing Stability    In the last 12 months, was there a time when you were not able to pay the mortgage or rent on time? No (P)      In the last 12 months, how many places have you lived? 1 (P)       In the last 12 months, was there a time when you did not have a steady place to sleep or slept in a shelter (including now)? No (P)         Transportation Needs    In the past 12 months, has lack of transportation kept you from medical appointments or from getting medications? No (P)      In the past 12 months, has lack of transportation kept you from meetings, work, or from getting things needed for daily living? No (P)         Food Insecurity    Within the past 12 months, you worried that your food would run out before you got the money to buy more. Never true (P)      Within the past 12 months, the food you bought just didn't last and you didn't have money to get more. Never true (P)         Stress    Do you feel stress - tense, restless, nervous, or anxious, or unable to sleep at night because your mind is troubled all the time - these days? Not at all (P)         Social Connections    In a typical week, how many times do you talk on the phone with family, friends, or neighbors? Never (P)      How often do you get together with friends or relatives? Never (P)      How often do you attend Evangelical or Faith services? Never (P)      Do you belong to any clubs or organizations such as Evangelical groups, unions, fraternal or athletic groups, or school groups? No (P)      How often do you attend meetings of the clubs or organizations you belong to? Never (P)      Are you , , , , never , or living with a partner?  (P)         Alcohol Use    Q1: How often do you have a drink containing alcohol? Never (P)      Q2: How many drinks containing alcohol do you have on a typical day when you are drinking? Patient does not drink (P)      Q3: How often do you have six or more drinks on one occasion? Never (P)         OTHER    Name(s) of People in Home Parents (P)

## 2023-07-26 NOTE — SUBJECTIVE & OBJECTIVE
Interval History: see above    Review of Systems   Constitutional:  Positive for activity change and fatigue. Negative for appetite change, fever and unexpected weight change.   HENT:  Negative for trouble swallowing.    Eyes:  Negative for photophobia and visual disturbance.   Respiratory:  Negative for cough and shortness of breath.    Cardiovascular:  Negative for chest pain and leg swelling.   Gastrointestinal:  Negative for abdominal pain, constipation, diarrhea, nausea and vomiting.   Endocrine: Negative for cold intolerance and heat intolerance.   Genitourinary:  Negative for difficulty urinating, flank pain and frequency.   Musculoskeletal:  Negative for arthralgias, back pain, gait problem and neck pain.   Neurological:  Positive for speech difficulty. Negative for seizures, light-headedness, numbness and headaches.   Psychiatric/Behavioral:  Positive for decreased concentration. Negative for behavioral problems.    Objective:     Vital Signs (Most Recent):  Temp: 98.2 °F (36.8 °C) (07/26/23 0745)  Pulse: 68 (07/26/23 0745)  Resp: 16 (07/26/23 0745)  BP: 103/61 (07/26/23 0745)  SpO2: 98 % (07/26/23 0745) Vital Signs (24h Range):  Temp:  [98 °F (36.7 °C)-98.6 °F (37 °C)] 98.2 °F (36.8 °C)  Pulse:  [60-87] 68  Resp:  [16-18] 16  SpO2:  [98 %-100 %] 98 %  BP: ()/(51-78) 103/61     Weight: 54.4 kg (120 lb)  Body mass index is 20.6 kg/m².  No intake or output data in the 24 hours ending 07/26/23 1027      Physical Exam  Constitutional:       General: She is not in acute distress.     Appearance: Normal appearance. She is ill-appearing.      Comments: Cushingoid   HENT:      Head: Normocephalic and atraumatic.      Nose: Nose normal.      Mouth/Throat:      Mouth: Mucous membranes are moist.   Eyes:      General: No scleral icterus.     Extraocular Movements: Extraocular movements intact.      Pupils: Pupils are equal, round, and reactive to light.   Cardiovascular:      Rate and Rhythm: Normal rate and  regular rhythm.      Pulses: Normal pulses.      Heart sounds: Normal heart sounds.   Pulmonary:      Effort: Pulmonary effort is normal.      Breath sounds: Normal breath sounds. No wheezing or rhonchi.   Chest:      Chest wall: No tenderness.   Abdominal:      General: Abdomen is flat. Bowel sounds are normal. There is no distension.      Palpations: Abdomen is soft.      Tenderness: There is no abdominal tenderness. There is no right CVA tenderness, left CVA tenderness, guarding or rebound.   Musculoskeletal:         General: No swelling, tenderness, deformity or signs of injury. Normal range of motion.      Cervical back: Normal range of motion and neck supple. No rigidity or tenderness.   Skin:     General: Skin is warm and dry.      Coloration: Skin is not jaundiced or pale.      Findings: No erythema or rash.   Neurological:      General: No focal deficit present.      Mental Status: She is alert and oriented to person, place, and time. Mental status is at baseline.      Cranial Nerves: No cranial nerve deficit.      Motor: No weakness.   Psychiatric:         Attention and Perception: She is inattentive.         Mood and Affect: Mood normal. Affect is blunt.         Speech: She is noncommunicative. Speech is delayed.         Behavior: Behavior is slowed and withdrawn.           Significant Labs: All pertinent labs within the past 24 hours have been reviewed.  CBC:   Recent Labs   Lab 07/25/23  1352 07/26/23  0346   WBC 5.36 4.98   HGB 13.2 12.9   HCT 39.6 39.4    255     CMP:   Recent Labs   Lab 07/25/23  1352 07/26/23  0346    140   K 4.0 3.7    108   CO2 27 22*   GLU 80 87   BUN 11 12   CREATININE 0.7 0.7   CALCIUM 9.1 9.0   PROT 6.6 6.1   ALBUMIN 3.7 3.5   BILITOT 0.4 0.5   ALKPHOS 87 81   AST 23 24   ALT 40 38   ANIONGAP 9 10       Significant Imaging: I have reviewed all pertinent imaging results/findings within the past 24 hours.

## 2023-07-26 NOTE — SUBJECTIVE & OBJECTIVE
Review of Systems   Unable to perform ROS: Mental status change   Objective:     Vital Signs (Most Recent):  Temp: 98.2 °F (36.8 °C) (07/26/23 0745)  Pulse: 68 (07/26/23 0745)  Resp: 16 (07/26/23 0745)  BP: 103/61 (07/26/23 0745)  SpO2: 98 % (07/26/23 0745) Vital Signs (24h Range):  Temp:  [98 °F (36.7 °C)-98.6 °F (37 °C)] 98.2 °F (36.8 °C)  Pulse:  [60-87] 68  Resp:  [16-18] 16  SpO2:  [98 %-100 %] 98 %  BP: ()/(51-78) 103/61     Weight: 54.4 kg (120 lb)  Body mass index is 20.6 kg/m².     Physical Exam   Neurological Exam:  MENTAL STATUS  Level of consciousness: alert  Orientation: unable to answer orientation questions  Moves all four extremities spontaneously.  Blinks to threat.  Occasionally verbalizes.  Able to follow commands, including blinking, getting up to walk.  No startle myoclonus.    CRANIAL NERVES  CN III, IV, VI: PERRL, EOMI  CN V: facial sensation intact, muscles of mastication intact  CN VII: hypomimia  CN IX, X: speech, when present, is soft and monotone    MOTOR EXAM  Muscle bulk: generally diminished, though this is most evident in the bilateral upper extremities, with profound palmar atrophy  Muscle tone: rigidity, most prominent in the bilateral upper extremities (distal >proximal); no spasticity    Strength - Upper Extremities   Arm abduction Elbow flexion Elbow extension Wrist flexion Wrist extension Finger abduction   Right 4/5 4/5 4/5 4/5 4/5 3/5   Left 4/5 4/5 4/5 4/5 4/5 3/5     Strength - Lower Extremities   Hip flexion Knee flexion Knee extension Dorsiflexion Plantarflexion   Right 5/5 5/5 5/5 5/5 5/5   Left 5/5 5/5 5/5 5/5 5/5     REFLEXES   Biceps Triceps Brachioradialis Patellar Achilles   Right +2 +2 +2 +2 +2   Left +2 +2 +2 +2 +2     Planter reflex: down-going bilaterally    SENSORY EXAM  Withdraws to pain in all four extremities. Assessment of specific modalities (vibration, pin-prick, temperature, proprioception) is limited due to mental status.    COORDINATION  Tremor:  none  Gait: slow gait with reduced arm swing and slow turning.           Significant Labs: All pertinent lab results from the past 24 hours have been reviewed.    Significant Imaging: I have reviewed all pertinent imaging results/findings within the past 24 hours.

## 2023-07-27 ENCOUNTER — ANESTHESIA EVENT (OUTPATIENT)
Dept: ENDOSCOPY | Facility: HOSPITAL | Age: 38
DRG: 057 | End: 2023-07-27
Payer: COMMERCIAL

## 2023-07-27 LAB
ALBUMIN SERPL BCP-MCNC: 3.8 G/DL (ref 3.5–5.2)
ALP SERPL-CCNC: 90 U/L (ref 55–135)
ALT SERPL W/O P-5'-P-CCNC: 44 U/L (ref 10–44)
ANION GAP SERPL CALC-SCNC: 11 MMOL/L (ref 8–16)
AST SERPL-CCNC: 27 U/L (ref 10–40)
BILIRUB SERPL-MCNC: 0.6 MG/DL (ref 0.1–1)
BUN SERPL-MCNC: 15 MG/DL (ref 6–20)
CALCIUM SERPL-MCNC: 9.6 MG/DL (ref 8.7–10.5)
CHLORIDE SERPL-SCNC: 106 MMOL/L (ref 95–110)
CO2 SERPL-SCNC: 20 MMOL/L (ref 23–29)
CREAT SERPL-MCNC: 0.7 MG/DL (ref 0.5–1.4)
ERYTHROCYTE [DISTWIDTH] IN BLOOD BY AUTOMATED COUNT: 13.1 % (ref 11.5–14.5)
EST. GFR  (NO RACE VARIABLE): >60 ML/MIN/1.73 M^2
GAMMA INTERFERON BACKGROUND BLD IA-ACNC: 0.02 IU/ML
GLUCOSE SERPL-MCNC: 79 MG/DL (ref 70–110)
HCT VFR BLD AUTO: 43.1 % (ref 37–48.5)
HGB BLD-MCNC: 14.2 G/DL (ref 12–16)
INR PPP: 1 (ref 0.8–1.2)
M TB IFN-G CD4+ BCKGRND COR BLD-ACNC: 0 IU/ML
M TB IFN-G CD4+ BCKGRND COR BLD-ACNC: 0.01 IU/ML
MCH RBC QN AUTO: 30.2 PG (ref 27–31)
MCHC RBC AUTO-ENTMCNC: 32.9 G/DL (ref 32–36)
MCV RBC AUTO: 92 FL (ref 82–98)
MITOGEN IGNF BCKGRD COR BLD-ACNC: 9.98 IU/ML
PLATELET # BLD AUTO: 255 K/UL (ref 150–450)
PMV BLD AUTO: 9.8 FL (ref 9.2–12.9)
POTASSIUM SERPL-SCNC: 4.2 MMOL/L (ref 3.5–5.1)
PROT SERPL-MCNC: 6.7 G/DL (ref 6–8.4)
PROTHROMBIN TIME: 10.3 SEC (ref 9–12.5)
RBC # BLD AUTO: 4.7 M/UL (ref 4–5.4)
SODIUM SERPL-SCNC: 137 MMOL/L (ref 136–145)
TB GOLD PLUS: NEGATIVE
WBC # BLD AUTO: 5.14 K/UL (ref 3.9–12.7)

## 2023-07-27 PROCEDURE — 0361U NEURFLMNT LT CHN DIG IA QUAN: CPT

## 2023-07-27 PROCEDURE — 25000003 PHARM REV CODE 250: Performed by: HOSPITALIST

## 2023-07-27 PROCEDURE — 11000001 HC ACUTE MED/SURG PRIVATE ROOM

## 2023-07-27 PROCEDURE — 85610 PROTHROMBIN TIME: CPT | Performed by: HOSPITALIST

## 2023-07-27 PROCEDURE — 36415 COLL VENOUS BLD VENIPUNCTURE: CPT | Performed by: HOSPITALIST

## 2023-07-27 PROCEDURE — 85027 COMPLETE CBC AUTOMATED: CPT | Performed by: HOSPITALIST

## 2023-07-27 PROCEDURE — 99232 SBSQ HOSP IP/OBS MODERATE 35: CPT | Mod: ,,, | Performed by: PSYCHIATRY & NEUROLOGY

## 2023-07-27 PROCEDURE — 80053 COMPREHEN METABOLIC PANEL: CPT | Performed by: HOSPITALIST

## 2023-07-27 PROCEDURE — 97535 SELF CARE MNGMENT TRAINING: CPT

## 2023-07-27 PROCEDURE — 99232 SBSQ HOSP IP/OBS MODERATE 35: CPT | Mod: ,,, | Performed by: HOSPITALIST

## 2023-07-27 PROCEDURE — 92526 ORAL FUNCTION THERAPY: CPT

## 2023-07-27 PROCEDURE — 63600175 PHARM REV CODE 636 W HCPCS: Performed by: HOSPITALIST

## 2023-07-27 PROCEDURE — 99232 PR SUBSEQUENT HOSPITAL CARE,LEVL II: ICD-10-PCS | Mod: ,,, | Performed by: HOSPITALIST

## 2023-07-27 PROCEDURE — 99232 PR SUBSEQUENT HOSPITAL CARE,LEVL II: ICD-10-PCS | Mod: ,,, | Performed by: PSYCHIATRY & NEUROLOGY

## 2023-07-27 RX ORDER — GLYCERIN 1 G/1
1 SUPPOSITORY RECTAL ONCE
Status: COMPLETED | OUTPATIENT
Start: 2023-07-27 | End: 2023-08-02

## 2023-07-27 RX ADMIN — SENNOSIDES AND DOCUSATE SODIUM 1 TABLET: 50; 8.6 TABLET ORAL at 08:07

## 2023-07-27 RX ADMIN — ENOXAPARIN SODIUM 40 MG: 40 INJECTION SUBCUTANEOUS at 05:07

## 2023-07-27 NOTE — ASSESSMENT & PLAN NOTE
This is a 38 year-old female who presents as a direct admission from movement disorders clinic for work and treatment of behavioral changes accompanied by parkinsonism and upper extremity weakness/atrophy. This has been progressively worsening since at least August 2022. Initial changes were behavioral, with reduced motivation, slowness in thought/speech, with motor symptoms (bradykinesia, subsequent upper extremity weakness and atrophy) being evident afterwards. She has received an extensive outpatient workup for inherited neurologic disease, autoimmune disease, which aside from a positive GISEL, has been unrevealing. Previously diagnosed with catatonia. MRI brain in May 2023 reviewed by myself and Dr. Camraa: shows bilateral atrophy of caudate and putamen, as well as bilateral frontal and temporal lobes. 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    Plan  -given negative CT chest, abdomen, pelvis please obtain transvaginal ultrasound (may require OBGYN consult) to further assess for mass/teratoma  -EEG is without abnormalities consistent w/CJD or NMDA receptor encephalitis  -needs LP: given patient's cognitive status, strongly recommend that this is done with IR and coordinated with anesthesia  ->CSF studies: cell count and differential, protein, glucose, cytology and flow cytometry, gram stain and culture, autoimmune encephalopathy panel ([facility] [place] sendout panel), RT-quIC ([facility]ion center sendout lab): these have already been ordered  ->should have 32cc of CSF drawn, with opening pressure checked  ->please inform both lab and IR/anesthesia that patient is being tested for prion disease, as both have protocols for this  -->have discussed with ID, who do not need to be consulted, however have recommended the above  -will plan for plasma exchange (PLEX) following LP  -needs EMG, but most  likely will be done outpatient

## 2023-07-27 NOTE — PT/OT/SLP PROGRESS
"Speech Language Pathology Treatment    Patient Name:  Thania Carroll   MRN:  [id number]  Admitting Diagnosis: Neurodegenerative disorder    Recommendations:                 General Recommendations:  Dysphagia therapy, Speech language evaluation, and possible Modified barium swallow study when feasible  Diet recommendations:  Dental Soft, Liquid Diet Level: Thin    only when vital signs stable, 1 bite/sip at a time, Assistance with meals, Avoid talking while eating, Eliminate distractions, Feed only when awake/alert/attentive, Frequent oral care, HOB to 90 degrees,  Monitor for s/s of aspiration, Small bites/sips, and Strict aspiration precautions Continue to monitor for signs and symptoms of aspiration and discontinue oral feeding should you notice any of the following: watery eyes, reddened facial area, wet vocal quality, increased work of breathing, change in respiratory status, increased congestion, coughing, fever and/or change in level of alertness.  General Precautions: Standard, aspiration, fall  Communication strategies:  provide increased time to answer, go to room if call light pushed, and use yes/no questions to clarify     Assessment:     Thania Carroll is a 38 y.o. female with an SLP diagnosis of Dysphagia.  No overt S/S aspiration with sips of thin liquids observed observed at the bedside. SLP unable to r/o silent aspiration at the bedside. Risk of aspiration remains due to decreased endurance.  Should change in status occur, further objective assessment via MBSS likely warranted to determine safest, least restrictive means nutrition/hydration. Findings reviewed with MD.     Subjective   SLP reviewed Pt with RN, RN cleared for tx  Pt presents calm  She communicates mostly via head nod and eye gaze this service day   Her mother explains, "we stopped using the larger straw at home"     Pain/Comfort:   Pt did not rate    Respiratory Status: Room air    Objective:     Has the patient been evaluated by SLP for " swallowing?   Yes  Keep patient NPO? No   Current Respiratory Status:   Room air     Pt found awake and alert in bed with family (Mother and Daughter) in the room. Pt with flat affect and decreased joint eye contact. She responded to yes/no questions 33% of attempts provided extra time via head nod. SLP unable to elicit vocalization via spontaneous or automatic speech to assess vocal quality.  Pt with completed breakfast meal tray in room. Her Mother initially explained Pt tolerating meals and also further reported Pt did cough with sip of orange juice. Pt seen with sips of thin liquids fed by her mother (straw sips x8.) No overt S/S aspiration with sips observed. Pt and her Mother were educated on swallow anatomy, aspiration precautions, possibility of further objective assessment via MBSS and ongoing SLP POC. No questions noted. Pt did not demonstrate or verbalize understanding. Pt remained upright in bed with family at the bedside upon SLP exit.     Goals:   Multidisciplinary Problems       SLP Goals          Problem: SLP    Goal Priority Disciplines Outcome   SLP Goal     SLP Ongoing, Progressing   Description: Speech Language Pathology Goals  Goals expected to be met by 8/2/23    1. Pt will participate in ongoing assessment of swallow function to determine safest, least restrictive means of nutrition/hydration  2. Educate Pt and family on aspiration precautions and SLP POC                         Plan:     Patient to be seen:  3 x/week   Plan of Care expires:  08/25/23  Plan of Care reviewed with:  patient, mother   SLP Follow-Up:  Yes       Discharge recommendations:  outpatient speech therapy     Time Tracking:     SLP Treatment Date:   07/27/23  Speech Start Time:  1029  Speech Stop Time:  1056     Speech Total Time (min):  27 min    Billable Minutes: Treatment Swallowing Dysfunction 15 and Self Care/Home Management Training 10    07/27/2023

## 2023-07-27 NOTE — PLAN OF CARE
Problem: Adult Inpatient Plan of Care  Goal: Optimal Comfort and Wellbeing  Outcome: Ongoing, Progressing     Problem: Adult Inpatient Plan of Care  Goal: Absence of Hospital-Acquired Illness or Injury  Outcome: Ongoing, Progressing     POC reviewed with the patient and they verbalized understanding. LP at 3p, family at bedside . All comments and concerns addressed. Bed locked in lowest position with bed alarm set, call light within reach. Safety precautions maintained. VSS, see flowsheets.  Will continue to monitor for changes to POC and clinical condition.

## 2023-07-27 NOTE — PT/OT/SLP PROGRESS
"Occupational Therapy   Treatment    Name: Thania Carroll  MRN: [id number]  Admitting Diagnosis:  Neurodegenerative disorder       Recommendations:     Discharge Recommendations: outpatient OT  Discharge Equipment Recommendations:  shower chair  Barriers to discharge:  Other (Comment) (decreased function related to PLOF)    Assessment:     Thania Carroll is a 38 y.o. female with a medical diagnosis of Neurodegenerative disorder.  Pt tolerated OT session well with good participation and motivation. Pt is progressing well overall but does remain limited. Pt would continue to benefit from skilled OT services to maximize functional (I) & facilitate safe discharge. Performance deficits affecting function are weakness, impaired endurance, impaired sensation, impaired self care skills, impaired functional mobility, gait instability, impaired balance, impaired cognition, decreased coordination, decreased upper extremity function, decreased lower extremity function, decreased safety awareness, decreased ROM, impaired coordination.     Rehab Prognosis:  Good; patient would benefit from acute skilled OT services to address these deficits and reach maximum level of function.       Plan:     Patient to be seen 4 x/week to address the above listed problems via self-care/home management, therapeutic activities, therapeutic exercises, neuromuscular re-education  Plan of Care Expires: 08/26/23  Plan of Care Reviewed with: patient, father, mother    Subjective     Chief Complaint: No complaints; patient non verbal except "yes" or "no"  Patient/Family Comments/goals: Increase independence, leave hospital better than she admitted  Pain/Comfort:  Pain Rating 1: 0/10 (Patient unable to voice pain, however, patient not in distress upon evaluation)    Objective:     Communicated with: DIOMEDES Segura prior to session.  Patient found with bed in chair position with EEG, peripheral IV, telemetry upon OT entry to room.    General Precautions: Standard, " fall    Orthopedic Precautions:N/A  Braces: N/A  Respiratory Status: Room air     Occupational Performance:     Bed Mobility:    Did not assess bed mobility at this time.      Activities of Daily Living:  Feeding:  maximal assistance ; patient worked on adaptations to feeding to try and increase independence at this time. Family and patient educated on adaptations to utensils to aid completion of feeding with lack of function in B hands. Trialed feeding with universal cuff; patient responded well and family would like to continue working on mechanics of feeding with use of universal cuff. Patient with decreased ROM and strength; therefore, BUE strengthening and coordination exercises are needed to help achieve full range to complete feeding.       [facility] 6 Click ADL: 12    Treatment & Education:  Treatment as described above.   -Education on energy conservation and task modification to maximize safety and (I) during ADLs and mobility  -Education on adaptations for feeding utensils to increase independence.  -Education on importance of OOB activity to improve overall activity tolerance and promote recovery  -Pt educated to call for assistance and to transfer with hospital staff only  -Provided education regarding role of OT, POC, & discharge recommendations with pt, mother, and father verbalizing understanding.  Pt had no further questions & when asked whether there were any concerns pt reported none.      Patient left with bed in chair position with all lines intact, call button in reach, nursing notified, and daughter and mother present    GOALS:   Multidisciplinary Problems       Occupational Therapy Goals          Problem: Occupational Therapy    Goal Priority Disciplines Outcome Interventions   Occupational Therapy Goal     OT, PT/OT Ongoing, Progressing    Description: Goals to be met by: 8/9/23     Patient will increase functional independence with ADLs by performing:    Feeding with Moderate Assistance.  UE  Dressing with Moderate Assistance.  LE Dressing with Maximum Assistance.  Grooming while standing at sink with Moderate Assistance.  Toileting from toilet with Moderate Assistance for hygiene and clothing management.   Toilet transfer to toilet with Contact Guard Assistance.  Upper extremity exercise program (PROM of BUE) x20 reps, with independence.                         Time Tracking:     OT Date of Treatment: 07/27/23  OT Start Time: 0849  OT Stop Time: 0906  OT Total Time (min): 17 min    Billable Minutes:Self Care/Home Management 17    OT/MIA: OT          7/27/2023

## 2023-07-27 NOTE — PLAN OF CARE
Problem: Adult Inpatient Plan of Care  Goal: Plan of Care Review  Outcome: Ongoing, Progressing  Flowsheets (Taken 7/27/2023 0204)  Plan of Care Reviewed With:   patient   mother   father  Goal: Optimal Comfort and Wellbeing  Outcome: Ongoing, Progressing     Problem: Fall Injury Risk  Goal: Absence of Fall and Fall-Related Injury  Outcome: Ongoing, Progressing     Problem: Seizure, Active Management  Goal: Absence of Seizure/Seizure-Related Injury  Outcome: Ongoing, Progressing  Intervention: Prevent Seizure-Related Injury  Flowsheets (Taken 7/27/2023 0204)  Seizure Precautions:   activity supervised   clutter-free environment maintained   emergency equipment at bedside   soft boundaries provided   side rails padded     Problem: Infection  Goal: Absence of Infection Signs and Symptoms  Outcome: Ongoing, Progressing     Problem: Skin Injury Risk Increased  Goal: Skin Health and Integrity  Outcome: Ongoing, Progressing           POC updated and reviewed with the patient/parents at bedside. Questions regarding POC were encouraged and addressed. VSS, see flowsheets. Patient's orientation status JONATAN at this time. Patient answers yes/no to questions when asked by mother.  Pain management using PRNs, effective. See MAR for medication administration details. Fall/safety precautions maintained. Seizure precautions maintained. No signs of injury noted over night. Patient was repositioned for comfort with bed locked in low position, side rails up x 3, bed alarm set, and call light within reach. Instructed patient to call staff for mobility, verbalized understanding. No acute signs of distress noted at this time.

## 2023-07-27 NOTE — SUBJECTIVE & OBJECTIVE
Interval History: LP today    Review of Systems   Constitutional:  Positive for activity change and fatigue.   Neurological:  Positive for speech difficulty. Negative for seizures.   Objective:     Vital Signs (Most Recent):  Temp: 98.3 °F (36.8 °C) (07/27/23 1038)  Pulse: 82 (07/27/23 1038)  Resp: 17 (07/27/23 1038)  BP: 104/67 (07/27/23 1038)  SpO2: 99 % (07/27/23 1038) Vital Signs (24h Range):  Temp:  [96 °F (35.6 °C)-98.8 °F (37.1 °C)] 98.3 °F (36.8 °C)  Pulse:  [60-91] 82  Resp:  [16-18] 17  SpO2:  [97 %-99 %] 99 %  BP: ()/(58-69) 104/67     Weight: 54.4 kg (119 lb 15.9 oz)  Body mass index is 20.6 kg/m².    Intake/Output Summary (Last 24 hours) at 7/27/2023 1458  Last data filed at 7/27/2023 1036  Gross per 24 hour   Intake 500 ml   Output 500 ml   Net 0 ml         Physical Exam  Constitutional:       General: She is not in acute distress.     Appearance: She is well-developed and normal weight. She is not diaphoretic.   Skin:     General: Skin is warm and dry.      Coloration: Skin is not jaundiced or pale.   Neurological:      Mental Status: She is alert.      Motor: No seizure activity.   Psychiatric:         Speech: She is noncommunicative. Speech is delayed.         Behavior: Behavior is slowed and withdrawn.           Significant Labs: All pertinent labs within the past 24 hours have been reviewed.    Significant Imaging: I have reviewed all pertinent imaging results/findings within the past 24 hours.  CT Chest Abdomen Pelvis W W/O Contrast 7/26/23:  FINDINGS:   CHEST:   Thoracic soft tissue: Unremarkable.   Heart: Normal in size with no evidence of pericardial effusion. No atherosclerotic disease of the coronary arteries.   Aorta: No significant atherosclerotic disease.   Adenopathy: None demonstrated.   Lungs: Ground-glass attenuation throughout the bilateral lung fields, suggestive of possible infectious or inflammatory process.  Bandlike opacification within the left lung base, possibly  representing scarring or atelectasis.   ABDOMEN:   Liver: Normal in size.  Few hepatic hypodensities, too small to characterize.   Gallbladder and biliary: No evidence of gallstones.  No intrahepatic or extrahepatic ductal dilatation.   Spleen: Within normal limits.   Pancreas: Within normal limits.   Adrenals: Within normal limits.   Kidneys: Both kidneys are normal in size and location with no evidence of nephrolithiasis or hydronephrosis.  Subcentimeter right renal hypodensity, too small to characterize.   Bowel: Within normal limits.  No evidence of obstruction or inflammatory process.   Peritoneum: No ascites or pneumoperitoneum.   Abdominal Adenopathy: None.   Abdominal wall: Tiny fat containing umbilical hernia.   Vasculature: Atherosclerotic disease of the abdominal aorta with no evidence of aneurysmal dilatation.   PELVIS:   Urinary bladder: Unremarkable.   Reproductive organs: The uterus is unremarkable.   Pelvis adenopathy: None.   Bones: Degenerative disease of the spine with no evidence of acute fractures or lytic lesions.   Miscellaneous: None.   Impression:  1. No findings to suggest primary or metastatic disease within the chest abdomen or pelvis.   2. Ground-glass attenuation throughout the bilateral lung fields, suggestive of possible infectious or inflammatory process.   3. Additional findings, as above.

## 2023-07-27 NOTE — PLAN OF CARE
Patient progressing on feeding goals. Trialed universal cuff and patient and family were receptive to adaptive equipment and want therapy to bring it in when working with her. Patient still max A for feeding due to decreased strenght/ROM in BUEs limiting. Will continue to benefit from POC set.     Goals to be met by: 8/9/23     Patient will increase functional independence with ADLs by performing:    Feeding with Moderate Assistance.  UE Dressing with Moderate Assistance.  LE Dressing with Maximum Assistance.  Grooming while standing at sink with Moderate Assistance.  Toileting from toilet with Moderate Assistance for hygiene and clothing management.   Toilet transfer to toilet with Contact Guard Assistance.  Upper extremity exercise program (PROM of BUE) x20 reps, with independence.

## 2023-07-27 NOTE — SUBJECTIVE & OBJECTIVE
Review of Systems   Unable to perform ROS: Mental status change   Objective:     Vital Signs (Most Recent):  Temp: 98.3 °F (36.8 °C) (07/27/23 1038)  Pulse: 82 (07/27/23 1038)  Resp: 17 (07/27/23 1038)  BP: 104/67 (07/27/23 1038)  SpO2: 99 % (07/27/23 1038) Vital Signs (24h Range):  Temp:  [96 °F (35.6 °C)-98.8 °F (37.1 °C)] 98.3 °F (36.8 °C)  Pulse:  [60-91] 82  Resp:  [16-18] 17  SpO2:  [98 %-99 %] 99 %  BP: ()/(58-69) 104/67     Weight: 54.4 kg (119 lb 15.9 oz)  Body mass index is 20.6 kg/m².     Physical Exam   Neurological Exam:  MENTAL STATUS  Level of consciousness: alert  Orientation: unable to answer orientation questions  Moves all four extremities spontaneously.  Blinks to threat.  Occasionally verbalizes.  Able to follow commands, including blinking, getting up to walk.  No startle myoclonus.    CRANIAL NERVES  CN III, IV, VI: PERRL, EOMI  CN V: facial sensation intact, muscles of mastication intact  CN VII: hypomimia  CN IX, X: speech, when present, is soft and monotone    MOTOR EXAM  Muscle bulk: generally diminished, though this is most evident in the bilateral upper extremities, with profound palmar atrophy  Muscle tone: rigidity, most prominent in the bilateral upper extremities (distal >proximal); no spasticity    Strength - Upper Extremities   Arm abduction Elbow flexion Elbow extension Wrist flexion Wrist extension Finger abduction   Right 4/5 4/5 4/5 4/5 4/5 3/5   Left 4/5 4/5 4/5 4/5 4/5 3/5     Strength - Lower Extremities   Hip flexion Knee flexion Knee extension Dorsiflexion Plantarflexion   Right 5/5 5/5 5/5 5/5 5/5   Left 5/5 5/5 5/5 5/5 5/5     REFLEXES   Biceps Triceps Brachioradialis Patellar Achilles   Right +2 +2 +2 +2 +2   Left +2 +2 +2 +2 +2     Planter reflex: down-going bilaterally    SENSORY EXAM  Withdraws to pain in all four extremities. Assessment of specific modalities (vibration, pin-prick, temperature, proprioception) is limited due to mental  status.    COORDINATION  Tremor: none  Gait: slow gait with reduced arm swing and slow turning.           Significant Labs: All pertinent lab results from the past 24 hours have been reviewed.    Significant Imaging: I have reviewed all pertinent imaging results/findings within the past 24 hours.

## 2023-07-27 NOTE — PROCEDURES
DATE: 7/26/23    EEG NUMBER:   -1,  -2    REFERRING PHYSICIAN:  Dr. Alicea      This EEG was performed to assess for evidence of underlying epilepsy.     ELECTROENCEPHALOGRAM REPORT     METHODOLOGY:  Electroencephalographic (EEG) recording is with electrodes placed according to the International 10-20 placement system.  Thirty two (32) channels of digital signal are simultaneously recorded from the scalp and may include EKG, EMG, and/or eye monitors.   Recording band pass was 0.1 to 512 hz.  Digital video recording of the patient is simultaneously recorded with the EEG.  The staff report clinical symptoms and may press an event button when the patient has symptoms of clinical interest to the treating physicians.  EEG and video recording is stored and archived in digital format.  The entire recording is visually reviewed, and the times identified by computer analysis as being spikes or seizures are reviewed again.  Activation procedures which include photic stimulation, hyperventilation and instructing patients to perform simple task are done in selected patients.   Compresses spectral analysis (CSA) is also performed on the activity recorded from each individual channel.  This is displayed as a power display of frequencies from 0 to 30 Hz over time.   The CSA analysis is done and displayed continuously.  This is reviewed for asymmetries in power between homologous areas of the scalp and for presence of changes in power which can be seen when seizures occur.  Sections of suspected abnormalities on the CSA is then compared with the original EEG recording.                [employer] software was also utilized in the review of this study.  This software suite analyzes the EEG recording in multiple domains.  Coherence and rhythmicity is computed to identify EEG sections which may contain organized seizures.  Each channel undergoes analysis to detect presence of spike and sharp waves which have special and  morphological characteristic of epileptic activity.  The routine EEG recording is converted from spacial into frequency domain.  This is then displayed comparing homologous areas to identify areas of significant asymmetry.  Algorithm to identify non-cortically generated artifact is used to separate eye movement, EMG and other artifact from the EEG.     Recording times   Start on July 26, 2023 at hour 15 minute 57 seconds 44   End on July 27, 2023 at hours 7 minute 0 seconds 1   Start on July 27, 2023 at hour 7 minute 0 seconds 36   End on July 27, 2023 at hours 14 minute 50 seconds 24  The total time of EEG recording for the study was 22 hours and 23 minutes    EEG FINDINGS:  The recording was obtained with a number of standard bipolar and referential montages during wakefulness, drowsiness and sleep.  In the alert state, the posterior background rhythm was a symmetric, well-modulated 9 to 10 Hz alpha rhythm, which reacted symmetrically to eye opening.  Activation procedures were not performed.  During drowsiness, the background rhythm waxed and waned and there were periods of slowing.  During stage II sleep, symmetric V waves and sleep spindles were noted.  There were no focal abnormalities.  There were no interictal epileptiform abnormalities and no clinical or electrographic seizures were recorded.    The EKG channel revealed a sinus rhythm.     IMPRESSION:  This is a normal EEG during wakefulness, drowsiness and sleep.     CLINICAL CORRELATION:  The patient is a 38-year-old female with a clinical picture consistent with any neurodegenerative disorder was currently not maintained on any antiseizure medications. This is a normal EEG during wakefulness, drowsiness and sleep.  There is no evidence for neither cortical dysfunction nor an epileptic process on this recording.  No seizures were recorded during this study.

## 2023-07-27 NOTE — PROGRESS NOTES
"[facility] - Neurosurgery ([facility])  Neurology  Progress Note    Patient Name: Thania Carroll  MRN: [id number]  Admission Date: 7/25/2023  Hospital Length of Stay: 2 days  Code Status: Full Code   Attending Provider: Yohan Alicea MD  Primary Care Physician: Primary Doctor No   Principal Problem:Neurodegenerative disorder    HPI:   Ms. Carroll is a 38 year-old female with a history of juvenile rheumatoid arthritis, who presents as a direct admission from movement disorders clinic for inpatient lumbar puncture for work-up of progressively worsening behavioral changes and parkinsonism. History is obtained per discussion with movement disorders clinic staff (Nohemy Brady), chart review, and from the patient's father who is at bedside. The patient has been experiencing the above changes since 2022, and per chart review and discussion with family it appears that there was significant worsening around August and then November of that year. Initial symptoms are described by family as a "slowing of her thinking and motivation." She was slow to respond to others, less communicative, and was having more difficulties with ADLs, including taking care of her baby. As this worsened, additional motor symptoms became evident. Bradykinesia was initially predominant, affecting first the bilateral upper extremities, and then the lower extremities particularly in regard to slowing of gait and turning progressing to gait instability with falls. The distal upper extremities also became progressively weaker, leading to difficulty with motor tasks and further impairment of daily activities, and subsequent palmar muscle atrophy and bilateral "clawing" of the hands. She was become markedly less communicative over the last three or so months per family, and per chart review has been exhibiting both echolalia and palilalia since September/October of last year. Additionally has suffered from dysphagia to both solids and liquids. All of this " has progressed to the point of her being unable to work, and ultimately unable to care for herself.    She originally presented to OSH for bradykinesia and apathy. Received a diagnosis of catatonia and was referred to psychiatry. Seen by neurology in [facility] in 11/2022 and was again diagnosed with catatonia. Additionally seen by neurology in [place], and there was concern to autoimmune disorder, however was reportedly lost to follow-up. Currently follows in the [facility] movement disorders clinic. She has undergone an extensive outpatient work-up thus far, including testing for hakan's disease, hereditary forms of parkinson's disease, frontotemporal dementia, NMDA receptor encephalitis, autoimmune disease (GISEL, dsDNA, smith), Varun's disease, Sjorgen's: all of which appears to have been negative, aside from positive GISEL. Additionally had peripheral blood smear done, which was without acanthocytosis. MRI brain in 2023 demonstrates frontal and temporal lobe atrophy, as well as bilateral caudate and putamen atrophy. She has previously received 5 day course of pulse-dose steroids without any improvement. 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 Admitted to hospital medicine with neurology consultation.      Overview/Hospital Course:  No notes on file      Review of Systems   Unable to perform ROS: Mental status change   Objective:     Vital Signs (Most Recent):  Temp: 98.3 °F (36.8 °C) (07/27/23 1038)  Pulse: 82 (07/27/23 1038)  Resp: 17 (07/27/23 1038)  BP: 104/67 (07/27/23 1038)  SpO2: 99 % (07/27/23 1038) Vital Signs (24h Range):  Temp:  [96 °F (35.6 °C)-98.8 °F (37.1 °C)] 98.3 °F (36.8 °C)  Pulse:  [60-91] 82  Resp:  [16-18] 17  SpO2:  [98 %-99 %] 99 %  BP: ()/(58-69) 104/67     Weight: 54.4 kg (119 lb 15.9 oz)  Body mass index is 20.6  kg/m².     Physical Exam   Neurological Exam:  MENTAL STATUS  Level of consciousness: alert  Orientation: unable to answer orientation questions  Moves all four extremities spontaneously.  Blinks to threat.  Occasionally verbalizes.  Able to follow commands, including blinking, getting up to walk.  No startle myoclonus.    CRANIAL NERVES  CN III, IV, VI: PERRL, EOMI  CN V: facial sensation intact, muscles of mastication intact  CN VII: hypomimia  CN IX, X: speech, when present, is soft and monotone    MOTOR EXAM  Muscle bulk: generally diminished, though this is most evident in the bilateral upper extremities, with profound palmar atrophy  Muscle tone: rigidity, most prominent in the bilateral upper extremities (distal >proximal); no spasticity    Strength - Upper Extremities   Arm abduction Elbow flexion Elbow extension Wrist flexion Wrist extension Finger abduction   Right 4/5 4/5 4/5 4/5 4/5 3/5   Left 4/5 4/5 4/5 4/5 4/5 3/5     Strength - Lower Extremities   Hip flexion Knee flexion Knee extension Dorsiflexion Plantarflexion   Right 5/5 5/5 5/5 5/5 5/5   Left 5/5 5/5 5/5 5/5 5/5     REFLEXES   Biceps Triceps Brachioradialis Patellar Achilles   Right +2 +2 +2 +2 +2   Left +2 +2 +2 +2 +2     Planter reflex: down-going bilaterally    SENSORY EXAM  Withdraws to pain in all four extremities. Assessment of specific modalities (vibration, pin-prick, temperature, proprioception) is limited due to mental status.    COORDINATION  Tremor: none  Gait: slow gait with reduced arm swing and slow turning.           Significant Labs: All pertinent lab results from the past 24 hours have been reviewed.    Significant Imaging: I have reviewed all pertinent imaging results/findings within the past 24 hours.    Assessment and Plan:     * Neurodegenerative disorder  This is a 38 year-old female who presents as a direct admission from movement disorders clinic for work and treatment of behavioral changes accompanied by parkinsonism  and upper extremity weakness/atrophy. This has been progressively worsening since at least August 2022. Initial changes were behavioral, with reduced motivation, slowness in thought/speech, with motor symptoms (bradykinesia, subsequent upper extremity weakness and atrophy) being evident afterwards. She has received an extensive outpatient workup for inherited neurologic disease, autoimmune disease, which aside from a positive GISEL, has been unrevealing. Previously diagnosed with catatonia. MRI brain in May 2023 reviewed by myself and Dr. Camara: shows bilateral atrophy of caudate and putamen, as well as bilateral frontal and temporal lobes. 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    Plan  -given negative CT chest, abdomen, pelvis please obtain transvaginal ultrasound (may require OBGYN consult) to further assess for mass/teratoma  -EEG is without abnormalities consistent w/CJD or NMDA receptor encephalitis  -needs LP: given patient's cognitive status, strongly recommend that this is done with IR and coordinated with anesthesia  ->CSF studies: cell count and differential, protein, glucose, cytology and flow cytometry, gram stain and culture, autoimmune encephalopathy panel ([facility] [place] sendout panel), RT-quIC ([facility] prion center sendout lab): these have already been ordered  ->should have 32cc of CSF drawn, with opening pressure checked  ->please inform both lab and IR/anesthesia that patient is being tested for prion disease, as both have protocols for this  -->have discussed with ID, who do not need to be consulted, however have recommended the above  -will plan for plasma exchange (PLEX) following LP  -needs EMG, but most likely will be done outpatient        VTE Risk Mitigation (From admission, onward)         Ordered     enoxaparin injection 40 mg  Daily         07/25/23 1802     IP VTE  HIGH RISK PATIENT  Once         07/25/23 1802     Place sequential compression device  Until discontinued         07/25/23 1802                Amaury Collins DO  Neurology  [facility] - Neurosurgery ([facility])

## 2023-07-27 NOTE — PROGRESS NOTES
[facility] Neurosurgery ([facility]  Progress Note    Patient Name: Thania Carroll  MRN: [id number]  Patient Class: IP- Inpatient   Admission Date: 2023  Length of Stay: 2 days  Attending Physician: Yohan Alicea MD  Primary Care Provider: Primary Doctor No        Subjective:     Principal Problem:Neurodegenerative disorder        HPI:  Thania Carroll is a 38 year old white woman,  2 para 2, with juvenile rheumatoid arthritis, catatonia. She lives in [place], [place]. She is .    She presented to [facility] on 2023. She had been having progressive neurological deterioration over the past 8 months with weakness, speech changes, slowing of mentation, joint contractures of hands. She can ambulate with assistance and was cognizant of where she wanted to go in the house as her family assisted her. She was able to communicate only through yes-no questions. Her symptoms seemed to have worsened with her last pregnancy in 2022, though symptoms started prior to it. She first noticed she had trouble using her hands when holding her baby in May 2022 and told her mother about it in  or July. Her family noticed that she started slowing down in early . She took longer to wash her hair, had trouble orienting things, had trouble holding onto things and losing her , then noticed she was moving more slowly in general. She underwent two neurologic workups, one at [facility] ([facility]) and the other in [place] with neurologist Dr. Franco. She was diagnosed with catatonia at [facility]. She was referred to Psychiatry. Dr. Franco performed lumbar puncture. She was hospitalized in 2023 for further workup. Dr. Franco suspected an autoimmune disorder. She underwent an extended electroencephalogram at home that was normal. Her hands started to contract. She started having echolalia (meaningles repetition) in  September/October as well as palilalia (involuntary repeating words). She had staring episodes that lasted a minute or two. She noted more muscle atrophy in November 2022. She delivered in May 2023 and was still breastfeeding an eight week old baby. She had no speech for the last 3 months. Had had several falls over the past week. Her last fall was 3 days ago. She started having emotional lability in October/November 2022.  She was seen at [facility] clinic on 7/24/2023. The only medication she had ever been on was prednisone in January with no improvement, methylprednisolone with no response, and methotrexate. She was seen by Nohemy Brady PA-C and Dr. Harshal Lyons, who recommended lumbar puncture with encephalopathy panel to send to [facility] and movement panel to rule out NMDAr encephalitis and 14-3-3, total tau RT-QulC, neurofilament light chain, inpatient IVIG and methylprednisolone, electroencephalogram to evaluate for delta brush, electromyelogram. Her father noticed intermittent choking spells when eating.   Clinic and emergency department evaluations:  Cbc. Cmp. Cpk, lipid panel- nl  Heavy metal screen 6/2023 - nl  TFTs -nl  Movement Disorder, Autoimmune pannel, cerruloplasm, negative  NA 1:160  UPT negative  Trep Ab bucky  U tox neg  Pinole's D - neg  Lyme - neg  Hereditary Parkinson's - neg  CT head 1/2023  MRI head - done 6/15.23, some atrophy out of proportion for age, particularly in bilateral caudate and putamen noted by  Nohemy Brady PA-C.    She was admitted to Hospital Medicine Team N.      Overview/Hospital Course:  CT chest abdomen pelvis was done to assess for mass, particularly ovarian teratoma, which may be associated with autoimmune encephalitis (especially NMDA receptor encephalitis). It did not show an ovarian tumor, so transvaginal ultrasound was recommended. She was put on continuous EEG to assess for abnormalities seen in NMDA receptor  encephalitis (ie delta brush) vs abnormalities seen in other conditions such as Creutzfeld-Tripp Disease. Infection Control was consulted due to testing for prion disease. Fluoroscopy was consulted for lumbar puncture with opening pressure and CSF studies ordered: cell count and differential, protein, glucose, cytology and flow cytometry, gram stain and culture, autoimmune encephalopathy panel ([facility] sendout panel), RT-quIC ([facility] prion center sendout lab). Neurology planned plasma exchange (PLEX) following lumbar puncture, and electromyography, likely outpatient.           Interval History: LP today    Review of Systems   Constitutional:  Positive for activity change and fatigue.   Neurological:  Positive for speech difficulty. Negative for seizures.   Objective:     Vital Signs (Most Recent):  Temp: 98.3 °F (36.8 °C) (07/27/23 1038)  Pulse: 82 (07/27/23 1038)  Resp: 17 (07/27/23 1038)  BP: 104/67 (07/27/23 1038)  SpO2: 99 % (07/27/23 1038) Vital Signs (24h Range):  Temp:  [96 °F (35.6 °C)-98.8 °F (37.1 °C)] 98.3 °F (36.8 °C)  Pulse:  [60-91] 82  Resp:  [16-18] 17  SpO2:  [97 %-99 %] 99 %  BP: ()/(58-69) 104/67     Weight: 54.4 kg (119 lb 15.9 oz)  Body mass index is 20.6 kg/m².    Intake/Output Summary (Last 24 hours) at 7/27/2023 1458  Last data filed at 7/27/2023 1036  Gross per 24 hour   Intake 500 ml   Output 500 ml   Net 0 ml         Physical Exam  Constitutional:       General: She is not in acute distress.     Appearance: She is well-developed and normal weight. She is not diaphoretic.   Skin:     General: Skin is warm and dry.      Coloration: Skin is not jaundiced or pale.   Neurological:      Mental Status: She is alert.      Motor: No seizure activity.   Psychiatric:         Speech: She is noncommunicative. Speech is delayed.         Behavior: Behavior is slowed and withdrawn.           Significant Labs: All pertinent labs within the past 24 hours have been reviewed.    Significant Imaging:  I have reviewed all pertinent imaging results/findings within the past 24 hours.  CT Chest Abdomen Pelvis W W/O Contrast 7/26/23:  FINDINGS:   CHEST:   Thoracic soft tissue: Unremarkable.   Heart: Normal in size with no evidence of pericardial effusion. No atherosclerotic disease of the coronary arteries.   Aorta: No significant atherosclerotic disease.   Adenopathy: None demonstrated.   Lungs: Ground-glass attenuation throughout the bilateral lung fields, suggestive of possible infectious or inflammatory process.  Bandlike opacification within the left lung base, possibly representing scarring or atelectasis.   ABDOMEN:   Liver: Normal in size.  Few hepatic hypodensities, too small to characterize.   Gallbladder and biliary: No evidence of gallstones.  No intrahepatic or extrahepatic ductal dilatation.   Spleen: Within normal limits.   Pancreas: Within normal limits.   Adrenals: Within normal limits.   Kidneys: Both kidneys are normal in size and location with no evidence of nephrolithiasis or hydronephrosis.  Subcentimeter right renal hypodensity, too small to characterize.   Bowel: Within normal limits.  No evidence of obstruction or inflammatory process.   Peritoneum: No ascites or pneumoperitoneum.   Abdominal Adenopathy: None.   Abdominal wall: Tiny fat containing umbilical hernia.   Vasculature: Atherosclerotic disease of the abdominal aorta with no evidence of aneurysmal dilatation.   PELVIS:   Urinary bladder: Unremarkable.   Reproductive organs: The uterus is unremarkable.   Pelvis adenopathy: None.   Bones: Degenerative disease of the spine with no evidence of acute fractures or lytic lesions.   Miscellaneous: None.   Impression:  1. No findings to suggest primary or metastatic disease within the chest abdomen or pelvis.   2. Ground-glass attenuation throughout the bilateral lung fields, suggestive of possible infectious or inflammatory process.   3. Additional findings, as above.      Assessment/Plan:       * Neurodegenerative disorder  Neurology following. LP today. CT showed no ovarian tumor so get ultrasound.    Oropharyngeal dysphagia  SLP. Dental soft diet.    Breastfeeding (infant)  8 week old baby  Called and discussed case with the [facility]:  - I discussed breastfeeding risk of prion disease w ID earlier today.  - informed mother and father at bedside that there is little to nil information about prion disease and breastfeeding.  - Nursing supervisor to find safe room to feed baby.  Pt has a right to breastfeed.  - I will recommend the prudent approach to pump and discard until diagnosis made (prion disease ruled out). The LP is planned late on 7/27 and may take days to weeks to get results back.  - slow lactation wean if pt prefers to stop breastfeeding. Can use NSAID for engorgement, monitor for mastitis.   - equipment for pumping can be obtained in PICU  - can call the [facility] [phone] if positive for prion disease.      Juvenile rheumatoid arthritis  Chronic, stable.  GISEL :160, autoimmune panel is negative  Rheu Factor Neg        VTE Risk Mitigation (From admission, onward)         Ordered     enoxaparin injection 40 mg  Daily         07/25/23 1802     IP VTE HIGH RISK PATIENT  Once         07/25/23 1802     Place sequential compression device  Until discontinued         07/25/23 1802                Discharge Planning   ALANNA: 8/1/2023     Code Status: Full Code   Is the patient medically ready for discharge?: No    Reason for patient still in hospital (select all that apply): Laboratory test, Imaging and Consult recommendations  Discharge Plan A: Home, Home with family   Discharge Delays: None known at this time              Yohan Alicea MD  Department of Hospital Medicine   [facility] - Neurosurgery ([facility])

## 2023-07-27 NOTE — ANESTHESIA PREPROCEDURE EVALUATION
[facility]  Anesthesia Pre-Operative Evaluation         Patient Name: Thania Carroll  YOB: 1985  MRN: [id number]    SUBJECTIVE:     Pre-operative evaluation for Procedure(s) (LRB):  Lumbar Puncture (N/A)     07/27/2023    Thania Carroll is a 38 y.o. female w/ a significant PMHx of juvenile RA, 2 pregnancies, catatonia, who presents for bradykinesia, apathy and symptoms suggestive of progressive systemic illness with recent birth of child 5/2023 currently breastfeeding-- encephalopathic in need of LP.      LDA:        Peripheral IV - Single Lumen 07/25/23 1351 20 G Left Antecubital (Active)       Patient Active Problem List   Diagnosis    Neurodegenerative disorder    Juvenile rheumatoid arthritis    Breastfeeding (infant)    Oropharyngeal dysphagia       Review of patient's allergies indicates:  No Known Allergies    Current Inpatient Medications:    enoxparin  40 mg Subcutaneous Daily    glycerin adult  1 suppository Rectal Once    polyethylene glycol  17 g Oral Daily    senna-docusate 8.6-50 mg  1 tablet Oral BID       Current Outpatient Medications   Medication Instructions    methylPREDNISolone sodium succinate (SOLU-MEDROL) 1,000 mg injection MIX 1g (1 SYRINGE) into a smoothie to take once daily for 5 doses       Surgical History  History reviewed. No pertinent surgical history.    Social History:  Tobacco Use: Not on file     Alcohol Use: Not At Risk    Frequency of Alcohol Consumption: Never    Average Number of Drinks: Patient does not drink    Frequency of Binge Drinking: Never         OBJECTIVE:     Vital Signs Range (Last 24H):  Temp:  [35.6 °C (96 °F)-37.1 °C (98.8 °F)]   Pulse:  [60-91]   Resp:  [16-18]   BP: ()/(58-69)   SpO2:  [97 %-99 %]       Significant Labs:  Lab Results   Component Value Date    WBC 5.14 07/27/2023    HGB 14.2 07/27/2023    HCT 43.1 07/27/2023     07/27/2023       Lab Results   Component Value Date     07/27/2023     K 4.2 07/27/2023     07/27/2023    CREATININE 0.7 07/27/2023    BUN 15 07/27/2023    CO2 20 (L) 07/27/2023       Lab Results   Component Value Date    TSH 0.452 01/03/2023       EKG:   No results found for this or any previous visit.    ASSESSMENT/PLAN:         Pre-op Assessment    I have reviewed the Patient Summary Reports.     I have reviewed the Nursing Notes. I have reviewed the NPO Status.   I have reviewed the Medications.     Review of Systems      Physical Exam  General: Well nourished, Cooperative and Alert    Airway:  Mallampati: III   Mouth Opening: Small, but > 3cm  Neck ROM: Normal ROM    Dental:  Intact        Anesthesia Plan  Type of Anesthesia, risks & benefits discussed:    Anesthesia Type: MAC, Gen Natural Airway  Intra-op Monitoring Plan: Standard ASA Monitors  Post Op Pain Control Plan: multimodal analgesia  Induction:  IV  Informed Consent: Informed consent signed with the Patient and all parties understand the risks and agree with anesthesia plan.  All questions answered.   ASA Score: 3  Day of Surgery Review of History & Physical: H&P Update referred to the surgeon/provider.    Ready For Surgery From Anesthesia Perspective.     .

## 2023-07-28 ENCOUNTER — ANESTHESIA (OUTPATIENT)
Dept: ENDOSCOPY | Facility: HOSPITAL | Age: 38
DRG: 057 | End: 2023-07-28
Payer: COMMERCIAL

## 2023-07-28 ENCOUNTER — HOSPITAL ENCOUNTER (OUTPATIENT)
Dept: RADIOLOGY | Facility: HOSPITAL | Age: 38
Discharge: HOME | DRG: 057 | End: 2023-07-28
Attending: EMERGENCY MEDICINE | Admitting: HOSPITALIST
Payer: COMMERCIAL

## 2023-07-28 DIAGNOSIS — R68.89 ABULIA: ICD-10-CM

## 2023-07-28 DIAGNOSIS — G20.C PARKINSONISM, UNSPECIFIED PARKINSONISM TYPE: ICD-10-CM

## 2023-07-28 DIAGNOSIS — F48.2 PSEUDOBULBAR AFFECT: ICD-10-CM

## 2023-07-28 LAB
ALBUMIN SERPL BCP-MCNC: 3.8 G/DL (ref 3.5–5.2)
ALP SERPL-CCNC: 91 U/L (ref 55–135)
ALT SERPL W/O P-5'-P-CCNC: 41 U/L (ref 10–44)
ANION GAP SERPL CALC-SCNC: 10 MMOL/L (ref 8–16)
AST SERPL-CCNC: 24 U/L (ref 10–40)
BILIRUB SERPL-MCNC: 0.6 MG/DL (ref 0.1–1)
BUN SERPL-MCNC: 17 MG/DL (ref 6–20)
CALCIUM SERPL-MCNC: 9.7 MG/DL (ref 8.7–10.5)
CHLORIDE SERPL-SCNC: 106 MMOL/L (ref 95–110)
CLARITY CSF: CLEAR
CLARITY CSF: CLEAR
CO2 SERPL-SCNC: 22 MMOL/L (ref 23–29)
COLOR CSF: COLORLESS
COLOR CSF: COLORLESS
CREAT SERPL-MCNC: 0.8 MG/DL (ref 0.5–1.4)
CSF, COMMENT: ABNORMAL
CSF, COMMENT: NORMAL
ERYTHROCYTE [DISTWIDTH] IN BLOOD BY AUTOMATED COUNT: 13.4 % (ref 11.5–14.5)
EST. GFR  (NO RACE VARIABLE): >60 ML/MIN/1.73 M^2
GLUCOSE SERPL-MCNC: 86 MG/DL (ref 70–110)
HCT VFR BLD AUTO: 41.8 % (ref 37–48.5)
HGB BLD-MCNC: 13.8 G/DL (ref 12–16)
MCH RBC QN AUTO: 30.8 PG (ref 27–31)
MCHC RBC AUTO-ENTMCNC: 33 G/DL (ref 32–36)
MCV RBC AUTO: 93 FL (ref 82–98)
PLATELET # BLD AUTO: 259 K/UL (ref 150–450)
PMV BLD AUTO: 10.1 FL (ref 9.2–12.9)
POTASSIUM SERPL-SCNC: 3.8 MMOL/L (ref 3.5–5.1)
PROT SERPL-MCNC: 6.8 G/DL (ref 6–8.4)
RBC # BLD AUTO: 4.48 M/UL (ref 4–5.4)
RBC # CSF: 0 /CU MM
RBC # CSF: 1 /CU MM
SODIUM SERPL-SCNC: 138 MMOL/L (ref 136–145)
SPECIMEN VOL CSF: 3 ML
SPECIMEN VOL CSF: 4 ML
WBC # BLD AUTO: 5.85 K/UL (ref 3.9–12.7)
WBC # CSF: 0 /CU MM (ref 0–5)
WBC # CSF: 0 /CU MM (ref 0–5)

## 2023-07-28 PROCEDURE — 63600175 PHARM REV CODE 636 W HCPCS: Performed by: NURSE ANESTHETIST, CERTIFIED REGISTERED

## 2023-07-28 PROCEDURE — 25000003 PHARM REV CODE 250: Performed by: NURSE ANESTHETIST, CERTIFIED REGISTERED

## 2023-07-28 PROCEDURE — 11000001 HC ACUTE MED/SURG PRIVATE ROOM

## 2023-07-28 PROCEDURE — 84182 PROTEIN WESTERN BLOT TEST: CPT | Mod: 91

## 2023-07-28 PROCEDURE — 82945 GLUCOSE OTHER FLUID: CPT | Performed by: PHYSICIAN ASSISTANT

## 2023-07-28 PROCEDURE — 62328 FL LUMBAR PUNCTURE DIAGNOSTIC WITH IMAGING: ICD-10-PCS | Mod: ,,, | Performed by: RADIOLOGY

## 2023-07-28 PROCEDURE — 80053 COMPREHEN METABOLIC PANEL: CPT | Performed by: HOSPITALIST

## 2023-07-28 PROCEDURE — 86317 IMMUNOASSAY INFECTIOUS AGENT: CPT | Mod: 91

## 2023-07-28 PROCEDURE — 87205 SMEAR GRAM STAIN: CPT

## 2023-07-28 PROCEDURE — 84182 PROTEIN WESTERN BLOT TEST: CPT | Performed by: PHYSICIAN ASSISTANT

## 2023-07-28 PROCEDURE — 97530 THERAPEUTIC ACTIVITIES: CPT

## 2023-07-28 PROCEDURE — D9220A PRA ANESTHESIA: ICD-10-PCS | Mod: ANES,,, | Performed by: ANESTHESIOLOGY

## 2023-07-28 PROCEDURE — 71000033 HC RECOVERY, INTIAL HOUR

## 2023-07-28 PROCEDURE — 89051 BODY FLUID CELL COUNT: CPT | Performed by: PHYSICIAN ASSISTANT

## 2023-07-28 PROCEDURE — 62328 DX LMBR SPI PNXR W/FLUOR/CT: CPT

## 2023-07-28 PROCEDURE — D9220A PRA ANESTHESIA: ICD-10-PCS | Mod: CRNA,,, | Performed by: NURSE ANESTHETIST, CERTIFIED REGISTERED

## 2023-07-28 PROCEDURE — 88108 PR  CYTOPATH FLUIDS,CONCENTRATN,INTERP: ICD-10-PCS | Mod: 26,,, | Performed by: PATHOLOGY

## 2023-07-28 PROCEDURE — 94761 N-INVAS EAR/PLS OXIMETRY MLT: CPT

## 2023-07-28 PROCEDURE — 37000008 HC ANESTHESIA 1ST 15 MINUTES

## 2023-07-28 PROCEDURE — 85027 COMPLETE CBC AUTOMATED: CPT | Performed by: HOSPITALIST

## 2023-07-28 PROCEDURE — 62328 DX LMBR SPI PNXR W/FLUOR/CT: CPT | Mod: ,,, | Performed by: RADIOLOGY

## 2023-07-28 PROCEDURE — 87529 HSV DNA AMP PROBE: CPT

## 2023-07-28 PROCEDURE — 99233 PR SUBSEQUENT HOSPITAL CARE,LEVL III: ICD-10-PCS | Mod: ,,, | Performed by: HOSPITALIST

## 2023-07-28 PROCEDURE — 37000009 HC ANESTHESIA EA ADD 15 MINS

## 2023-07-28 PROCEDURE — 88108 CYTOPATH CONCENTRATE TECH: CPT | Performed by: PATHOLOGY

## 2023-07-28 PROCEDURE — 0035U NEURO CSF PRION PRTN QUAL: CPT | Performed by: PHYSICIAN ASSISTANT

## 2023-07-28 PROCEDURE — D9220A PRA ANESTHESIA: Mod: ANES,,, | Performed by: ANESTHESIOLOGY

## 2023-07-28 PROCEDURE — 99000 SPECIMEN HANDLING OFFICE-LAB: CPT

## 2023-07-28 PROCEDURE — 87070 CULTURE OTHR SPECIMN AEROBIC: CPT

## 2023-07-28 PROCEDURE — 88108 CYTOPATH CONCENTRATE TECH: CPT | Mod: 26,,, | Performed by: PATHOLOGY

## 2023-07-28 PROCEDURE — 71000039 HC RECOVERY, EACH ADD'L HOUR

## 2023-07-28 PROCEDURE — 86255 FLUORESCENT ANTIBODY SCREEN: CPT | Mod: 59 | Performed by: PHYSICIAN ASSISTANT

## 2023-07-28 PROCEDURE — D9220A PRA ANESTHESIA: Mod: CRNA,,, | Performed by: NURSE ANESTHETIST, CERTIFIED REGISTERED

## 2023-07-28 PROCEDURE — 99233 SBSQ HOSP IP/OBS HIGH 50: CPT | Mod: ,,, | Performed by: HOSPITALIST

## 2023-07-28 PROCEDURE — 87798 DETECT AGENT NOS DNA AMP: CPT

## 2023-07-28 PROCEDURE — 84157 ASSAY OF PROTEIN OTHER: CPT | Performed by: PHYSICIAN ASSISTANT

## 2023-07-28 PROCEDURE — 86592 SYPHILIS TEST NON-TREP QUAL: CPT

## 2023-07-28 PROCEDURE — 97535 SELF CARE MNGMENT TRAINING: CPT

## 2023-07-28 PROCEDURE — 97116 GAIT TRAINING THERAPY: CPT | Mod: CQ

## 2023-07-28 PROCEDURE — 36415 COLL VENOUS BLD VENIPUNCTURE: CPT | Performed by: HOSPITALIST

## 2023-07-28 RX ORDER — HYDROMORPHONE HYDROCHLORIDE 1 MG/ML
0.5 INJECTION, SOLUTION INTRAMUSCULAR; INTRAVENOUS; SUBCUTANEOUS EVERY 10 MIN PRN
Status: DISCONTINUED | OUTPATIENT
Start: 2023-07-28 | End: 2023-07-31

## 2023-07-28 RX ORDER — LIDOCAINE HYDROCHLORIDE 20 MG/ML
INJECTION INTRAVENOUS
Status: DISCONTINUED | OUTPATIENT
Start: 2023-07-28 | End: 2023-07-28

## 2023-07-28 RX ORDER — PROPOFOL 10 MG/ML
VIAL (ML) INTRAVENOUS CONTINUOUS PRN
Status: DISCONTINUED | OUTPATIENT
Start: 2023-07-28 | End: 2023-07-28

## 2023-07-28 RX ORDER — OXYCODONE HYDROCHLORIDE 5 MG/1
5 TABLET ORAL
Status: DISCONTINUED | OUTPATIENT
Start: 2023-07-28 | End: 2023-07-31

## 2023-07-28 RX ORDER — PROPOFOL 10 MG/ML
VIAL (ML) INTRAVENOUS
Status: DISCONTINUED | OUTPATIENT
Start: 2023-07-28 | End: 2023-07-28

## 2023-07-28 RX ORDER — PHENYLEPHRINE HYDROCHLORIDE 10 MG/ML
INJECTION INTRAVENOUS
Status: DISCONTINUED | OUTPATIENT
Start: 2023-07-28 | End: 2023-07-28

## 2023-07-28 RX ORDER — FENTANYL CITRATE 50 UG/ML
25 INJECTION, SOLUTION INTRAMUSCULAR; INTRAVENOUS EVERY 5 MIN PRN
Status: DISCONTINUED | OUTPATIENT
Start: 2023-07-28 | End: 2023-07-31

## 2023-07-28 RX ORDER — ONDANSETRON 2 MG/ML
4 INJECTION INTRAMUSCULAR; INTRAVENOUS DAILY PRN
Status: DISCONTINUED | OUTPATIENT
Start: 2023-07-28 | End: 2023-07-31

## 2023-07-28 RX ADMIN — PHENYLEPHRINE HYDROCHLORIDE 100 MCG: 10 INJECTION INTRAVENOUS at 04:07

## 2023-07-28 RX ADMIN — GLYCOPYRROLATE 0.2 MG: 0.2 INJECTION, SOLUTION INTRAMUSCULAR; INTRAVENOUS at 04:07

## 2023-07-28 RX ADMIN — PROPOFOL 50 MG: 10 INJECTION, EMULSION INTRAVENOUS at 04:07

## 2023-07-28 RX ADMIN — LIDOCAINE HYDROCHLORIDE 50 MG: 20 INJECTION INTRAVENOUS at 04:07

## 2023-07-28 RX ADMIN — SODIUM CHLORIDE, SODIUM GLUCONATE, SODIUM ACETATE, POTASSIUM CHLORIDE, MAGNESIUM CHLORIDE, SODIUM PHOSPHATE, DIBASIC, AND POTASSIUM PHOSPHATE: .53; .5; .37; .037; .03; .012; .00082 INJECTION, SOLUTION INTRAVENOUS at 04:07

## 2023-07-28 RX ADMIN — Medication 100 MCG/KG/MIN: at 04:07

## 2023-07-28 NOTE — H&P
Inpatient Radiology Pre-procedure Note    History of Present Illness:  Thania Carroll is a 38 y.o. female with cognitive decline who presents for diagnostic lumbar puncture.    Admission H&P reviewed.  Past Medical History:   Diagnosis Date    Catatonia     Juvenile rheumatoid arthritis      No past surgical history on file.    Review of Systems:   As documented in primary team H&P    Home Meds:   Prior to Admission medications    Medication Sig Start Date End Date Taking? Authorizing Provider   methylPREDNISolone sodium succinate (SOLU-MEDROL) 1,000 mg injection MIX 1g (1 SYRINGE) into a smoothie to take once daily for 5 doses 6/20/23   Nohemy Brady PA-C     Scheduled Meds:   Continuous Infusions:   PRN Meds:  Anticoagulants/Antiplatelets: no anticoagulation    Allergies: Review of patient's allergies indicates:  No Known Allergies  Sedation Hx: have not been any systemic reactions    Labs:  Recent Labs   Lab 07/27/23  1801   INR 1.0       Recent Labs   Lab 07/28/23  0517   WBC 5.85   HGB 13.8   HCT 41.8   MCV 93         Recent Labs   Lab 07/28/23  0517   GLU 86      K 3.8      CO2 22*   BUN 17   CREATININE 0.8   CALCIUM 9.7   ALT 41   AST 24   ALBUMIN 3.8   BILITOT 0.6         Vitals:        Physical Exam:  ASA: per ANESTHESIA  Mallampati: per ANESTHESIA    General: no acute distress  Mental Status: not oriented  HEENT: normocephalic, atraumatic  Chest: unlabored breathing  Heart: regular heart rate  Abdomen: nondistended  Extremity: moves all extremities    Plan: diagnostic lumbar puncture    Sedation Plan: per ANESTHESIA    John Parada MD  PGY-5  RADIOLOGY

## 2023-07-28 NOTE — SUBJECTIVE & OBJECTIVE
Review of Systems   Unable to perform ROS: Mental status change     Objective:     Vital Signs (Most Recent):  Temp: 98.7 °F (37.1 °C) (07/29/23 1123)  Pulse: 74 (07/29/23 1124)  Resp: 18 (07/29/23 1124)  BP: 97/66 (07/29/23 1123)  SpO2: 97 % (07/29/23 1124) Vital Signs (24h Range):  Temp:  [97 °F (36.1 °C)-98.7 °F (37.1 °C)] 98.7 °F (37.1 °C)  Pulse:  [45-96] 74  Resp:  [11-18] 18  SpO2:  [96 %-100 %] 97 %  BP: ()/(59-71) 97/66     Weight: 54.4 kg (119 lb 15.9 oz)  Body mass index is 20.6 kg/m².     Physical Exam   Neurological Exam:  MENTAL STATUS  Level of consciousness: alert  Orientation: unable to answer orientation questions  Moves all four extremities spontaneously.  Blinks to threat.  Occasionally verbalizes.  Able to follow commands, including blinking, getting up to walk.  No startle myoclonus.    CRANIAL NERVES  CN III, IV, VI: PERRL, EOMI  CN V: facial sensation intact, muscles of mastication intact  CN VII: hypomimia  CN IX, X: speech, when present, is soft and monotone    MOTOR EXAM  Muscle bulk: generally diminished, though this is most evident in the bilateral upper extremities, with profound palmar atrophy  Muscle tone: rigidity, most prominent in the bilateral upper extremities (distal >proximal); no spasticity    Strength - Upper Extremities   Arm abduction Elbow flexion Elbow extension Wrist flexion Wrist extension Finger abduction   Right 4/5 4/5 4/5 4/5 4/5 3/5   Left 4/5 4/5 4/5 4/5 4/5 3/5     Strength - Lower Extremities   Hip flexion Knee flexion Knee extension Dorsiflexion Plantarflexion   Right 5/5 5/5 5/5 5/5 5/5   Left 5/5 5/5 5/5 5/5 5/5     REFLEXES   Biceps Triceps Brachioradialis Patellar Achilles   Right +2 +2 +2 +2 +2   Left +2 +2 +2 +2 +2     Planter reflex: down-going bilaterally    SENSORY EXAM  Withdraws to pain in all four extremities. Assessment of specific modalities (vibration, pin-prick, temperature, proprioception) is limited due to mental  status.    COORDINATION  Tremor: none  Gait: slow gait with reduced arm swing and slow turning.           Significant Labs: All pertinent lab results from the past 24 hours have been reviewed.    Significant Imaging: I have reviewed all pertinent imaging results/findings within the past 24 hours.

## 2023-07-28 NOTE — PT/OT/SLP PROGRESS
Occupational Therapy   Treatment    Name: Thania Carroll  MRN: [id number]  Admitting Diagnosis:  Neurodegenerative disorder       Recommendations:     Discharge Recommendations: other (see comments)  Discharge Equipment Recommendations:  shower chair  Barriers to discharge:  Other (Comment)    Assessment:     Thania Carroll is a 38 y.o. female with a medical diagnosis of Neurodegenerative disorder.  She presents with decreased functional status post medical event. Performance deficits affecting function are weakness, impaired endurance, impaired sensation, impaired self care skills, impaired functional mobility, gait instability, impaired balance, impaired cognition, decreased coordination, decreased upper extremity function, decreased lower extremity function, impaired fine motor, impaired coordination, impaired joint extensibility, decreased ROM.     Rehab Prognosis:  Fair; patient would benefit from acute skilled OT services to address these deficits and reach maximum level of function.       Plan:     Patient to be seen 4 x/week to address the above listed problems via self-care/home management, neuromuscular re-education, therapeutic activities, cognitive retraining  Plan of Care Expires: 08/28/23  Plan of Care Reviewed with: patient, spouse, mother    Subjective     Chief Complaint: Patient unable to verbalize   Patient/Family Comments/goals: Gain functional status  Pain/Comfort:  Pain Rating 1: 0/10 (Patient unable to verbalize at this time)    Objective:     Communicated with: RN prior to session.  Patient found supine with telemetry upon OT entry to room.    General Precautions: Standard, aspiration, fall    Orthopedic Precautions:N/A  Braces: N/A  Respiratory Status: Room air     Occupational Performance:     Bed Mobility:    Patient completed Rolling/Turning to Right with minimum assistance  Patient completed Scooting/Bridging with minimum assistance  Patient completed Supine to Sit with minimum  assistance  Patient completed Sit to Supine with minimum assistance     Functional Mobility/Transfers:  Patient completed Sit <> Stand Transfer with minimum assistance  with  no assistive device   Functional Mobility: Patient able to functionally ambulate throughout room with no LOB; patient needing mod assist to maintain balance due to weak LE and trunk     Activities of Daily Living:  Grooming: maximal assistance EOB  Bathing: maximal assistance EOB  Upper Body Dressing: maximal assistance standing  Toileting: maximal assistance Using toilet with mod assist for balance and max A for hygiene and clothing management       [facility] 6 Click ADL: 12    Treatment & Education:  Patient completed various ADLs with max assist both sitting EOB and standing. Patient needing moderate assist at all times to maintain trunk balance. Patient impulsively performs Sit > stand and needs constant CGA-mod assist to assure safety. Patient BUE with extreme tightness w/ digits in flexed position. OT performed PROM on BUE, patient with history of juvenile R.A causing some joint deformity but mother reports this has worsened this hospital stay. OT recommended resting hand splint to family who had received splints earlier in hospital stay but have not been wearing them secondary to discomfort due to size. Patient with very small UE, OT suggested attempting a pediatric size resting hand splint to increase comfort. OT contacted orthotics who did not have item in stock; OT to follow up either prefab or custom hand splint.     Patient is mother to  infant. Mother reporting difficulty with breastfeeding due to patient limited UE ROM. Mother stating currently patient is sitting in a chair and utilizing a pillow for support in order to accomplish breastfeeding. Mother stating when patient participates in task patient demonstrates volitional AROM with BUE toward infant. OT to follow up with further recommendations for establishing patient  independence.     Patient left supine with all lines intact, RN notified, and mother present    GOALS:   Multidisciplinary Problems       Occupational Therapy Goals          Problem: Occupational Therapy    Goal Priority Disciplines Outcome Interventions   Occupational Therapy Goal     OT, PT/OT Ongoing, Progressing    Description: Goals to be met by: 8/9/23     Patient will increase functional independence with ADLs by performing:    Feeding with Moderate Assistance.  UE Dressing with Moderate Assistance.  LE Dressing with Maximum Assistance.  Grooming while standing at sink with Moderate Assistance.  Toileting from toilet with Moderate Assistance for hygiene and clothing management.   Toilet transfer to toilet with Contact Guard Assistance.  Upper extremity exercise program (PROM of BUE) x20 reps, with independence.                         Time Tracking:     OT Date of Treatment: 07/28/23  OT Start Time: 1338  OT Stop Time: 1420  OT Total Time (min): 42 min    Billable Minutes:Self Care/Home Management 30 minutes   Therapeutic Activity 12 minutes     OT/MIA: OT          7/28/2023

## 2023-07-28 NOTE — PLAN OF CARE
Transvaginal US negative for ovarian mass. Pending LP, per discussion w/family is planned for this afternoon. Will follow-up CSF studies. PLEX still to be arranged after LP.

## 2023-07-28 NOTE — ASSESSMENT & PLAN NOTE
This is a 38 year-old female who presents as a direct admission from movement disorders clinic for work and treatment of behavioral changes accompanied by parkinsonism and upper extremity weakness/atrophy. This has been progressively worsening since at least August 2022. Initial changes were behavioral, with reduced motivation, slowness in thought/speech, with motor symptoms (bradykinesia, subsequent upper extremity weakness and atrophy) being evident afterwards. She has received an extensive outpatient workup for inherited neurologic disease, autoimmune disease, which aside from a positive GISEL, has been unrevealing. Previously diagnosed with catatonia. MRI brain in May 2023 reviewed by myself and Dr. Camara: shows bilateral atrophy of caudate and putamen, as well as bilateral frontal and temporal lobes. 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    Plan  -CT chest, abdomen, pelvis and transvaginal ultrasound both negative for ovarian mass  -EEG is without abnormalities consistent w/CJD or NMDA receptor encephalitis  -unfortunately not enough CSF collected for studies: patient will need repeat LP: discussed w/both Dr. Alicea and Dr. Stearns: additionally discussed with family today, who are understanding  ->CSF studies: cell count and differential, protein, glucose, cytology and flow cytometry, gram stain and culture, autoimmune encephalopathy panel ([facility] [place] sendout panel), RT-quIC ([facility] sendout lab): neurology will re-order these when LP may be arranged again--very much appreciate anesthesia assistance  ->should have 32cc of CSF drawn, with opening pressure checked  ->please inform both lab and IR/anesthesia that patient is being tested for prion disease, as both have protocols for this  -->have discussed with ID, who do not need to be consulted, however have recommended the  above  -will plan for plasma exchange (PLEX) following LP  -needs EMG, but most likely will be done outpatient

## 2023-07-28 NOTE — PROCEDURES
Radiology Post-Procedure Note    Pre Op Diagnosis: cognitive decline    Post Op Diagnosis: Same    Procedure: lumbar puncture    Procedure performed by: John Parada MD    Written Informed Consent Obtained: Yes    Specimen Removed: 12 mL CSF    Estimated Blood Loss: Minimal    Findings: Following written informed consent and sterile prep and drape, a 22 gauge spinal needle was inserted at L4 - L5 intralaminar space under fluoroscopic surveillance.  Opening pressure = 9-12 cm H2O. 12 mL clear CSF removed and sent to the lab for further analysis.  There were no complications.    Patient tolerated procedure well.    John Parada MD  PGY-5  RADIOLOGY

## 2023-07-28 NOTE — PT/OT/SLP PROGRESS
Physical Therapy Treatment    Patient Name:  Thania Carroll   MRN:  [id number]    Recommendations:     Discharge Recommendations: other (see comments)  Discharge Equipment Recommendations: shower chair  Barriers to discharge: None    Assessment:     Thania Carroll is a 38 y.o. female admitted with a medical diagnosis of Neurodegenerative disorder.  She presents with the following impairments/functional limitations: weakness, impaired endurance, impaired sensation, impaired self care skills, impaired functional mobility, gait instability, impaired balance, impaired cognition, decreased coordination, decreased upper extremity function, decreased lower extremity function, decreased safety awareness, decreased ROM, impaired coordination, impaired fine motor, impaired joint extensibility.    Pt tolerates session well with focus on transfers and gait training. Pts mother assisting pt with sitting up to EOB, appears to require assistance to elevate trunk d/t lack of UE use. Pt requires generally Min A for all gait training d/t consistent instability throughout gait trial. Pt with lateral LOBs and lateral veering into open space both to L>R. Pt with most instability with turns, losing her balance each time d/t narrow LEIGH and lack of stepping strategy to self-correct. Family cleared to ambulate pt with use of in-room gait belt, RN and Charge RN notified. Family have been care takers for pt for prolonged period of time and are able to provide current assistance needed. Pt will continue to benefit from therapy services to address impairments listed above.     Rehab Prognosis: Good; patient would benefit from acute skilled PT services to address these deficits and reach maximum level of function.    Recent Surgery: Procedure(s) (LRB):  Lumbar Puncture (N/A)      Plan:     During this hospitalization, patient to be seen 4 x/week to address the identified rehab impairments via gait training, therapeutic activities, therapeutic  exercises, neuromuscular re-education and progress toward the following goals:    Plan of Care Expires:  23    Subjective     Chief Complaint: no c/o  Patient/Family Comments/goals: pt non-verbal; family asking to mobilize patient  Pain/Comfort:  Pain Rating 1: 0/10  Pain Rating Post-Intervention 1: 0/10      Objective:     Communicated with RN prior to session.  Patient found HOB elevated with telemetry upon PTA entry to room. Mother at bedside. Mother mobilizing patient to EOB when therapist returns to room after retrieving a gown for patient.     General Precautions: Standard, aspiration, fall  Orthopedic Precautions: N/A  Braces: N/A  Respiratory Status: Room air     Functional Mobility:  Transfers:     Sit to Stand:  contact guard assistance and minimum assistance with no AD  Gait: Pt ambulates greater than 500 ft on level unit surface with Min A. Pt unstable throughout with lateral instability and LOBs, especially with turning. Lateral veering L>R. Pt not responsive to cues to adjust gait/balance.       AM-PAC 6 CLICK MOBILITY  Turning over in bed (including adjusting bedclothes, sheets and blankets)?: 3  Sitting down on and standing up from a chair with arms (e.g., wheelchair, bedside commode, etc.): 3  Moving from lying on back to sitting on the side of the bed?: 2  Moving to and from a bed to a chair (including a wheelchair)?: 3  Need to walk in hospital room?: 3  Climbing 3-5 steps with a railing?: 3  Basic Mobility Total Score: 17     Patient left up in chair with all lines intact and family present.    GOALS:   Multidisciplinary Problems       Physical Therapy Goals          Problem: Physical Therapy    Goal Priority Disciplines Outcome Goal Variances Interventions   Physical Therapy Goal     PT, PT/OT Ongoing, Progressing     Description: Goals to be met by: 2023     Patient will increase functional independence with mobility by performin. Supine to sit with Stand-by Assistance  2. Sit  to stand from bedside chair with Supervision  3. Gait  x 200 feet with Supervision using No Assistive Device and no LOB  4. Ascend/descend 2 stair with no Handrails Stand-by Assistance using No Assistive Device.                          Time Tracking:     PT Received On: 07/28/23  PT Start Time: 0917     PT Stop Time: 0933  PT Total Time (min): 16 min     Billable Minutes: Gait Training 16    Treatment Type: Treatment  PT/PTA: PTA     Number of PTA visits since last PT visit: 1 07/28/2023

## 2023-07-28 NOTE — PLAN OF CARE
US done. PT resting comfortably with  at the bedside.      Problem: Adult Inpatient Plan of Care  Goal: Plan of Care Review  Outcome: Ongoing, Progressing  Goal: Patient-Specific Goal (Individualized)  Outcome: Ongoing, Progressing  Goal: Absence of Hospital-Acquired Illness or Injury  Outcome: Ongoing, Progressing  Goal: Optimal Comfort and Wellbeing  Outcome: Ongoing, Progressing  Goal: Readiness for Transition of Care  Outcome: Ongoing, Progressing     Problem: Fall Injury Risk  Goal: Absence of Fall and Fall-Related Injury  Outcome: Ongoing, Progressing     Problem: Seizure, Active Management  Goal: Absence of Seizure/Seizure-Related Injury  Outcome: Ongoing, Progressing     Problem: Infection  Goal: Absence of Infection Signs and Symptoms  Outcome: Ongoing, Progressing     Problem: Skin Injury Risk Increased  Goal: Skin Health and Integrity  Outcome: Ongoing, Progressing

## 2023-07-28 NOTE — PROGRESS NOTES
[facility] Neurosurgery [facility] Medicine  Progress Note    Patient Name: Thania Carroll  MRN: [id number]  Patient Class: IP- Inpatient   Admission Date: 2023  Length of Stay: 3 days  Attending Physician: Yohan Alicea MD  Primary Care Provider: Primary Doctor No        Subjective:     Principal Problem:Neurodegenerative disorder        HPI:  Thania Carroll is a 38 year old white woman,  2 para 2, with juvenile rheumatoid arthritis, catatonia. She lives in [place], [place]. She is . She has two children.   She presented to [facility] on 2023. She had been having progressive neurological deterioration over the past 8 months with weakness, speech changes, slowing of mentation, joint contractures of hands. She can ambulate with assistance and was cognizant of where she wanted to go in the house as her family assisted her. She was able to communicate only through yes-no questions. Her symptoms seemed to have worsened with her last pregnancy in 2022, though symptoms started prior to it. She first noticed she had trouble using her hands when holding her baby in May 2022 and told her mother about it in  or July. Her family noticed that she started slowing down in early . She took longer to wash her hair, had trouble orienting things, had trouble holding onto things and losing her , then noticed she was moving more slowly in general. She underwent two neurologic workups, one at [facility] ([facility]) and the other in [place] with neurologist Dr. Franco. She was diagnosed with catatonia at [facility]. She was referred to Psychiatry. Dr. Franco performed lumbar puncture. She was hospitalized in 2023 for further workup. Dr. Franco suspected an autoimmune disorder. She underwent an extended electroencephalogram at home that was normal. Her hands started to contract. She started having echolalia  (meaningles repetition) in September/October as well as palilalia (involuntary repeating words). She had staring episodes that lasted a minute or two. She noted more muscle atrophy in November 2022. She delivered in May 2023 and was still breastfeeding an eight week old baby. She had no speech for the last 3 months. Had had several falls over the past week. Her last fall was 3 days ago. She started having emotional lability in October/November 2022.  She was seen at [facility] Neurology clinic on 7/24/2023. The only medication she had ever been on was prednisone in January with no improvement, methylprednisolone with no response, and methotrexate. She was seen by Nohemy Brady PA-C and Dr. Harshal Lyons, who recommended lumbar puncture with encephalopathy panel to send to [facility] and movement panel to rule out NMDAr encephalitis and 14-3-3, total tau RT-QulC, neurofilament light chain, inpatient IVIG and methylprednisolone, electroencephalogram to evaluate for delta brush, electromyelogram. Her father noticed intermittent choking spells when eating.   Clinic and emergency department evaluations:  Cbc. Cmp. Cpk, lipid panel- nl  Heavy metal screen 6/2023 - nl  TFTs -nl  Movement Disorder, Autoimmune pannel, cerruloplasm, negative  NA 1:160  UPT negative  Trep Ab bucky  U tox neg  [place]'s D - neg  Lyme - neg  Hereditary Parkinson's - neg  CT head 1/2023  MRI head - done 6/15.23, some atrophy out of proportion for age, particularly in bilateral caudate and putamen noted by  Nohemy Brady PA-C.    She was admitted to Hospital Medicine Team N.      Overview/Hospital Course:  CT chest abdomen pelvis was done to assess for mass, particularly ovarian teratoma, which may be associated with autoimmune encephalitis (especially NMDA receptor encephalitis). It did not show an ovarian tumor, so transvaginal ultrasound was done and also showed no ovarian mass. She was put on continuous EEG  to assess for abnormalities seen in NMDA receptor encephalitis (ie delta brush) vs abnormalities seen in other conditions such as Creutzfeld-Tripp Disease. Infection Control was consulted due to testing for prion disease. Fluoroscopy was consulted for lumbar puncture with opening pressure and CSF studies ordered: cell count and differential, protein, glucose, cytology and flow cytometry, gram stain and culture, autoimmune encephalopathy panel ([facility] [place] sendout panel), RT-quIC ([facility] prion center sendout lab). Neurology planned plasma exchange (PLEX) following lumbar puncture, and electromyography, likely outpatient.       Interval History: LP today. Postponed yesterday due to needing Anesthesia.    Review of Systems   Constitutional:  Positive for activity change and fatigue.   Neurological:  Positive for speech difficulty. Negative for seizures.   Objective:     Vital Signs (Most Recent):  Temp: 98.4 °F (36.9 °C) (07/28/23 0436)  Pulse: 75 (07/28/23 0436)  Resp: 14 (07/28/23 0436)  BP: 102/68 (07/28/23 0436)  SpO2: 98 % (07/28/23 0436) Vital Signs (24h Range):  Temp:  [98.1 °F (36.7 °C)-98.4 °F (36.9 °C)] 98.4 °F (36.9 °C)  Pulse:  [73-94] 75  Resp:  [13-16] 14  SpO2:  [95 %-98 %] 98 %  BP: (102-111)/(63-82) 102/68     Weight: 54.4 kg (119 lb 15.9 oz)  Body mass index is 20.6 kg/m².  No intake or output data in the 24 hours ending 07/28/23 1456        Physical Exam  Constitutional:       General: She is not in acute distress.     Appearance: She is well-developed and normal weight. She is not diaphoretic.   Skin:     General: Skin is warm and dry.      Coloration: Skin is not jaundiced or pale.   Neurological:      Mental Status: She is alert.      Motor: No tremor or seizure activity.      Comments: Looks at speaker   Psychiatric:         Attention and Perception: Attention normal.         Speech: She is noncommunicative. Speech is delayed.         Behavior: Behavior is cooperative.           Significant Labs:  All pertinent labs within the past 24 hours have been reviewed.    Significant Imaging: I have reviewed all pertinent imaging results/findings within the past 24 hours.  US Pelvis Comp with Transvag NON-OB 7/28/23: FINDINGS:   Uterus:   Size: 5.9 x 3.2 x 4.9 cm   Masses: Two small subserosal fibroids noted measuring 1.0 x 0.8 x 1.8 cm and 0.8 x 0.6 x 0.7 cm.  The smaller lesion appears within the cervical region.   Endometrium: Normal in this pre menopausal patient, measuring 3 mm.   Right ovary:   Size: 1.8 x 2.5 x 1.7 cm   Appearance: No mass.  Few small physiologic ovarian follicles.   Vascular flow: Normal.   Left ovary:   Size: 2.2 x 1.3 x 2.7 cm   Appearance: No mass.  Few small physiologic ovarian follicles.   Vascular Flow: Normal.   Free Fluid:   None.   Impression:  No ovarian mass identified as clinically questioned.   Two small uterine/cervical fibroids.       Assessment/Plan:      * Neurodegenerative disorder  Neurology following. LP today. CT showed no ovarian tumor so get ultrasound.    Oropharyngeal dysphagia  SLP. Dental soft diet.    Breastfeeding (infant)  8 week old baby  Called and discussed case with the L[facility]ation Center:  - I discussed breastfeeding risk of prion disease w ID earlier today.  - informed mother and father at bedside that there is little to nil information about prion disease and breastfeeding.  - Nursing supervisor to find safe room to feed baby.  Pt has a right to breastfeed.  - I will recommend the prudent approach to pump and discard until diagnosis made (prion disease ruled out). The LP is planned late on 7/27 and may take days to weeks to get results back.  - slow lactation wean if pt prefers to stop breastfeeding. Can use NSAID for engorgement, monitor for mastitis.   - equipment for pumping can be obtained in PICU  - can call the [facility] [phone] if positive for prion disease.      Juvenile rheumatoid arthritis  Chronic, stable.  GISEL :160, autoimmune  panel is negative  Rheu Factor Neg        VTE Risk Mitigation (From admission, onward)         Ordered     enoxaparin injection 40 mg  Daily         07/25/23 1802     IP VTE HIGH RISK PATIENT  Once         07/25/23 1802     Place sequential compression device  Until discontinued         07/25/23 1802                Discharge Planning   ALANNA: 8/1/2023     Code Status: Full Code   Is the patient medically ready for discharge?: No    Reason for patient still in hospital (select all that apply): Patient trending condition, Laboratory test, Treatment and Consult recommendations  Discharge Plan A: Home, Home with family   Discharge Delays: None known at this time              Yohan Alicea MD  Department of Hospital Medicine   [facility] - Neurosurgery ([facility])

## 2023-07-28 NOTE — SUBJECTIVE & OBJECTIVE
Interval History: LP today. Postponed yesterday due to needing Anesthesia.    Review of Systems   Constitutional:  Positive for activity change and fatigue.   Neurological:  Positive for speech difficulty. Negative for seizures.   Objective:     Vital Signs (Most Recent):  Temp: 98.4 °F (36.9 °C) (07/28/23 0436)  Pulse: 75 (07/28/23 0436)  Resp: 14 (07/28/23 0436)  BP: 102/68 (07/28/23 0436)  SpO2: 98 % (07/28/23 0436) Vital Signs (24h Range):  Temp:  [98.1 °F (36.7 °C)-98.4 °F (36.9 °C)] 98.4 °F (36.9 °C)  Pulse:  [73-94] 75  Resp:  [13-16] 14  SpO2:  [95 %-98 %] 98 %  BP: (102-111)/(63-82) 102/68     Weight: 54.4 kg (119 lb 15.9 oz)  Body mass index is 20.6 kg/m².  No intake or output data in the 24 hours ending 07/28/23 1456        Physical Exam  Constitutional:       General: She is not in acute distress.     Appearance: She is well-developed and normal weight. She is not diaphoretic.   Skin:     General: Skin is warm and dry.      Coloration: Skin is not jaundiced or pale.   Neurological:      Mental Status: She is alert.      Motor: No tremor or seizure activity.      Comments: Looks at speaker   Psychiatric:         Attention and Perception: Attention normal.         Speech: She is noncommunicative. Speech is delayed.         Behavior: Behavior is cooperative.           Significant Labs: All pertinent labs within the past 24 hours have been reviewed.    Significant Imaging: I have reviewed all pertinent imaging results/findings within the past 24 hours.  US Pelvis Comp with Transvag NON-OB 7/28/23: FINDINGS:   Uterus:   Size: 5.9 x 3.2 x 4.9 cm   Masses: Two small subserosal fibroids noted measuring 1.0 x 0.8 x 1.8 cm and 0.8 x 0.6 x 0.7 cm.  The smaller lesion appears within the cervical region.   Endometrium: Normal in this pre menopausal patient, measuring 3 mm.   Right ovary:   Size: 1.8 x 2.5 x 1.7 cm   Appearance: No mass.  Few small physiologic ovarian follicles.   Vascular flow: Normal.   Left ovary:    Size: 2.2 x 1.3 x 2.7 cm   Appearance: No mass.  Few small physiologic ovarian follicles.   Vascular Flow: Normal.   Free Fluid:   None.   Impression:  No ovarian mass identified as clinically questioned.   Two small uterine/cervical fibroids.

## 2023-07-28 NOTE — NURSING TRANSFER
Nursing Transfer Note      7/28/2023   5:24 PM    Nurse giving handoff:Tawanda RODRIGUEZ Rn  Nurse receiving handoff: Loretta    Reason patient is being transferred: postop    Transfer To: 922    Transfer via stretcher    Transfer with n/a    Transported by escort    Transfer Vital Signs:  Blood Pressure:98/66  Heart Rate:84  O2:100  Temperature:97.3  Respirations:16    Telemetry: n/a  Order for Tele Monitor? No    Additional Lines: n/a    4eyes on Skin: yes    Medicines sent: n/a    Any special needs or follow-up needed:     Patient belongings transferred with patient: No    Chart send with patient: Yes    Notified: n/a    Patient reassessed at: 7/28/23  1  Upon arrival to floor: bed in lowest position

## 2023-07-28 NOTE — TRANSFER OF CARE
"Anesthesia Transfer of Care Note    Patient: Thania Carroll    Procedure(s) Performed: Procedure(s) (LRB):  Lumbar Puncture (N/A)    Patient location: PACU    Anesthesia Type: MAC    Transport from OR: Transported from OR on room air with adequate spontaneous ventilation    Post pain: adequate analgesia    Post assessment: no apparent anesthetic complications and tolerated procedure well    Post vital signs: stable    Level of consciousness: sedated    Nausea/Vomiting: no nausea/vomiting    Complications: none    Transfer of care protocol was followed      Last vitals:   Visit Vitals  BP 97/65 (BP Location: Right arm, Patient Position: Lying)   Pulse 61   Temp 36.3 °C (97.3 °F) (Temporal)   Resp 13   Ht 5' 4" (1.626 m)   Wt 54.4 kg (119 lb 15.9 oz)   LMP 05/22/2023 (Approximate)   SpO2 100%   Breastfeeding Yes   BMI 20.60 kg/m²     "

## 2023-07-29 PROCEDURE — 99232 PR SUBSEQUENT HOSPITAL CARE,LEVL II: ICD-10-PCS | Mod: ,,, | Performed by: HOSPITALIST

## 2023-07-29 PROCEDURE — 94761 N-INVAS EAR/PLS OXIMETRY MLT: CPT

## 2023-07-29 PROCEDURE — 25000003 PHARM REV CODE 250: Performed by: HOSPITALIST

## 2023-07-29 PROCEDURE — 99232 SBSQ HOSP IP/OBS MODERATE 35: CPT | Mod: ,,, | Performed by: HOSPITALIST

## 2023-07-29 PROCEDURE — 11000001 HC ACUTE MED/SURG PRIVATE ROOM

## 2023-07-29 RX ADMIN — SENNOSIDES AND DOCUSATE SODIUM 1 TABLET: 50; 8.6 TABLET ORAL at 09:07

## 2023-07-29 NOTE — PLAN OF CARE
Problem: Adult Inpatient Plan of Care  Goal: Plan of Care Review  Outcome: Ongoing, Not Progressing  Goal: Patient-Specific Goal (Individualized)  Outcome: Ongoing, Not Progressing  Goal: Absence of Hospital-Acquired Illness or Injury  Outcome: Ongoing, Not Progressing  Goal: Optimal Comfort and Wellbeing  Outcome: Ongoing, Not Progressing  Goal: Readiness for Transition of Care  Outcome: Ongoing, Not Progressing     Problem: Fall Injury Risk  Goal: Absence of Fall and Fall-Related Injury  Outcome: Ongoing, Not Progressing     Problem: Seizure, Active Management  Goal: Absence of Seizure/Seizure-Related Injury  Outcome: Ongoing, Not Progressing     Problem: Infection  Goal: Absence of Infection Signs and Symptoms  Outcome: Ongoing, Not Progressing     Problem: Skin Injury Risk Increased  Goal: Skin Health and Integrity  Outcome: Ongoing, Not Progressing   Review POC with patient and her ; pt's  verbalizes understanding and agrees with plan to repeat LP to obtain a larger sample (close to or at 32 mls).  Notify Central line placement order for PLEX was discontinued until repeat LP obtained.

## 2023-07-29 NOTE — PROGRESS NOTES
[facility] Neurosurgery [facility] Medicine  Progress Note    Patient Name: Thania Carroll  MRN: [id number]  Patient Class: IP- Inpatient   Admission Date: 2023  Length of Stay: 4 days  Attending Physician: Yohan Alicea MD  Primary Care Provider: Primary Doctor No        Subjective:     Principal Problem:Neurodegenerative disorder        HPI:  Thania Carroll is a 38 year old white woman,  2 para 2, with juvenile rheumatoid arthritis, catatonia. She lives in [place], [place]. She is . She has two children.   She presented to [facility] on 2023. She had been having progressive neurological deterioration over the past 8 months with weakness, speech changes, slowing of mentation, joint contractures of hands. She can ambulate with assistance and was cognizant of where she wanted to go in the house as her family assisted her. She was able to communicate only through yes-no questions. Her symptoms seemed to have worsened with her last pregnancy in 2022, though symptoms started prior to it. She first noticed she had trouble using her hands when holding her baby in May 2022 and told her mother about it in  or July. Her family noticed that she started slowing down in early . She took longer to wash her hair, had trouble orienting things, had trouble holding onto things and losing her , then noticed she was moving more slowly in general. She underwent two neurologic workups, one at [facility] ([facility]) and the other in [place] with neurologist Dr. Franco. She was diagnosed with catatonia at [facility]. She was referred to Psychiatry. Dr. Franco performed lumbar puncture. She was hospitalized in 2023 for further workup. Dr. Franco suspected an autoimmune disorder. She underwent an extended electroencephalogram at home that was normal. Her hands started to contract. She started having echolalia  (meaningles repetition) in September/October as well as palilalia (involuntary repeating words). She had staring episodes that lasted a minute or two. She noted more muscle atrophy in November 2022. She delivered in May 2023 and was still breastfeeding an eight week old baby. She had no speech for the last 3 months. Had had several falls over the past week. Her last fall was 3 days ago. She started having emotional lability in October/November 2022.  She was seen at [facility] Neurology clinic on 7/24/2023. The only medication she had ever been on was prednisone in January with no improvement, methylprednisolone with no response, and methotrexate. She was seen by Nohemy Brady PA-C and Dr. Harshal Lyons, who recommended lumbar puncture with encephalopathy panel to send to [facility] and movement panel to rule out NMDAr encephalitis and 14-3-3, total tau RT-QulC, neurofilament light chain, inpatient IVIG and methylprednisolone, electroencephalogram to evaluate for delta brush, electromyelogram. Her father noticed intermittent choking spells when eating.   Clinic and emergency department evaluations:  Cbc. Cmp. Cpk, lipid panel- nl  Heavy metal screen 6/2023 - nl  TFTs -nl  Movement Disorder, Autoimmune pannel, cerruloplasm, negative  NA 1:160  UPT negative  Trep Ab bucky  U tox neg  [place]'s D - neg  Lyme - neg  Hereditary Parkinson's - neg  CT head 1/2023  MRI head - done 6/15.23, some atrophy out of proportion for age, particularly in bilateral caudate and putamen noted by  Nohemy Brady PA-C.    She was admitted to Hospital Medicine Team N.      Overview/Hospital Course:  CT chest abdomen pelvis was done to assess for mass, particularly ovarian teratoma, which may be associated with autoimmune encephalitis (especially NMDA receptor encephalitis). It did not show an ovarian tumor, so transvaginal ultrasound was done and also showed no ovarian mass. She was put on continuous EEG  to assess for abnormalities seen in NMDA receptor encephalitis (ie delta brush) vs abnormalities seen in other conditions such as Creutzfeld-Tripp Disease. Infection Control was consulted due to testing for prion disease. Fluoroscopy was consulted for lumbar puncture with opening pressure and CSF studies ordered: cell count and differential, protein, glucose, cytology and flow cytometry, gram stain and culture, autoimmune encephalopathy panel ([facility] [place] sendout panel), RT-quIC ([facility] [place] [facility] prion center sendout lab). Neurology planned plasma exchange (PLEX) following lumbar puncture, and electromyography, likely outpatient. Lumbar puncture was done on 7/28/2023.       Interval History: Neurology recommends PLEX. Will request MPU procedure for central venous catheter placement for it.     Review of Systems   Constitutional:  Positive for activity change and fatigue.   Neurological:  Positive for speech difficulty. Negative for seizures.     Objective:     Vital Signs (Most Recent):  Temp: 98.7 °F (37.1 °C) (07/29/23 1123)  Pulse: 74 (07/29/23 1124)  Resp: 18 (07/29/23 1124)  BP: 97/66 (07/29/23 1123)  SpO2: 97 % (07/29/23 1124) Vital Signs (24h Range):  Temp:  [97 °F (36.1 °C)-98.7 °F (37.1 °C)] 98.7 °F (37.1 °C)  Pulse:  [45-96] 74  Resp:  [11-18] 18  SpO2:  [96 %-100 %] 97 %  BP: ()/(59-71) 97/66     Weight: 54.4 kg (119 lb 15.9 oz)  Body mass index is 20.6 kg/m².  No intake or output data in the 24 hours ending 07/29/23 1239        Physical Exam  Constitutional:       General: She is not in acute distress.     Appearance: She is well-developed and normal weight. She is not diaphoretic.   Skin:     General: Skin is warm and dry.      Coloration: Skin is not jaundiced or pale.   Neurological:      Mental Status: She is alert.   Psychiatric:         Attention and Perception: Attention normal.         Speech: She is noncommunicative.         Behavior: Behavior is not aggressive. Behavior is cooperative.              Significant Labs: All pertinent labs within the past 24 hours have been reviewed.    Significant Imaging: I have reviewed all pertinent imaging results/findings within the past 24 hours.      Assessment/Plan:      * Neurodegenerative disorder  Neurology following. LP today. CT showed no ovarian tumor so get ultrasound.    Oropharyngeal dysphagia  SLP. Dental soft diet.    Breastfeeding (infant)  Lactation nurse consulted. Patient is breastfeeding. Can call the [facility] [phone] if positive for prion disease.    Juvenile rheumatoid arthritis  Chronic, stable.  GISEL :160, autoimmune panel is negative  Rheu Factor Neg        VTE Risk Mitigation (From admission, onward)         Ordered     enoxaparin injection 40 mg  Daily         07/25/23 1802     IP VTE HIGH RISK PATIENT  Once         07/25/23 1802     Place sequential compression device  Until discontinued         07/25/23 1802                Discharge Planning   ALANNA: 8/1/2023     Code Status: Full Code   Is the patient medically ready for discharge?: No    Reason for patient still in hospital (select all that apply): Patient trending condition, Laboratory test, Treatment and Consult recommendations  Discharge Plan A: Home, Home with family   Discharge Delays: None known at this time              Yohan Alicea MD  Department of Hospital Medicine   [facility] - Neurosurgery ([facility])

## 2023-07-29 NOTE — PLAN OF CARE
Problem: Adult Inpatient Plan of Care  Goal: Plan of Care Review  Outcome: Ongoing, Progressing  Goal: Patient-Specific Goal (Individualized)  Outcome: Ongoing, Progressing  Goal: Absence of Hospital-Acquired Illness or Injury  Outcome: Ongoing, Progressing  Goal: Optimal Comfort and Wellbeing  Outcome: Ongoing, Progressing     Problem: Fall Injury Risk  Goal: Absence of Fall and Fall-Related Injury  Outcome: Ongoing, Progressing     Problem: Seizure, Active Management  Goal: Absence of Seizure/Seizure-Related Injury  Outcome: Ongoing, Progressing

## 2023-07-29 NOTE — ASSESSMENT & PLAN NOTE
Lactation nurse consulted. Patient is breastfeeding. Can call the [facility] [phone] if positive for prion disease.

## 2023-07-29 NOTE — NURSING
Recently spoke with patient's mother; secure chart message sent to Dr. Amaury Collins:  Good evening; patient's  is at bedside and reports that he was not present with rounds today and would like to speak with someone from Neurology at your convenience.

## 2023-07-29 NOTE — PLAN OF CARE
[id number]    Patient alert, nonverbal. Unable to express thoughts. Intermittently able to follow directions. Able to track visitors in room, unable to track upon command. Unable to use written communication. Continent of bladder and bowel. Lactating. Patient's mother reports BM this morning. NPO for LP with anesthesia today. Patient/family worked with PT/OT today. Bilateral hand contractures, limited ROM BUE. ROM BLE intact, spontaneous movements. Moderate assistance with transfers due to trunk instability.   Family anxious bedside. Daughter breastfeeding; setup to latch by mother or spouse. Reassurance provided and questions answered.     Safety measures maintained. Hourly rounding complete, personal belongings/call light within reach. Patient unable to make needs known. Family remains bedside at all times.       Problem: Adult Inpatient Plan of Care  Goal: Plan of Care Review  Outcome: Ongoing, Progressing  Goal: Patient-Specific Goal (Individualized)  Outcome: Ongoing, Progressing  Goal: Absence of Hospital-Acquired Illness or Injury  Outcome: Ongoing, Progressing  Goal: Optimal Comfort and Wellbeing  Outcome: Ongoing, Progressing  Goal: Readiness for Transition of Care  Outcome: Ongoing, Progressing     Problem: Fall Injury Risk  Goal: Absence of Fall and Fall-Related Injury  Outcome: Ongoing, Progressing     Problem: Seizure, Active Management  Goal: Absence of Seizure/Seizure-Related Injury  Outcome: Ongoing, Progressing     Problem: Infection  Goal: Absence of Infection Signs and Symptoms  Outcome: Ongoing, Progressing     Problem: Skin Injury Risk Increased  Goal: Skin Health and Integrity  Outcome: Ongoing, Progressing

## 2023-07-29 NOTE — NURSING
Patient's spouse requesting patient get a stool softener tonight before bedtime tonight and that it worked overnight yesterday and she was able to have a BM the next morning.

## 2023-07-29 NOTE — PROGRESS NOTES
"[facility] - Neurosurgery ([facility])  Neurology  Progress Note    Patient Name: Thania Carroll  MRN: [id number]  Admission Date: 7/25/2023  Hospital Length of Stay: 4 days  Code Status: Full Code   Attending Provider: Yohan Alicea MD  Primary Care Physician: Primary Doctor No   Principal Problem:Neurodegenerative disorder    HPI:   Ms. Carroll is a 38 year-old female with a history of juvenile rheumatoid arthritis, who presents as a direct admission from movement disorders clinic for inpatient lumbar puncture for work-up of progressively worsening behavioral changes and parkinsonism. History is obtained per discussion with movement disorders clinic staff (Nohemy Brady), chart review, and from the patient's father who is at bedside. The patient has been experiencing the above changes since 2022, and per chart review and discussion with family it appears that there was significant worsening around August and then November of that year. Initial symptoms are described by family as a "slowing of her thinking and motivation." She was slow to respond to others, less communicative, and was having more difficulties with ADLs, including taking care of her baby. As this worsened, additional motor symptoms became evident. Bradykinesia was initially predominant, affecting first the bilateral upper extremities, and then the lower extremities particularly in regard to slowing of gait and turning progressing to gait instability with falls. The distal upper extremities also became progressively weaker, leading to difficulty with motor tasks and further impairment of daily activities, and subsequent palmar muscle atrophy and bilateral "clawing" of the hands. She was become markedly less communicative over the last three or so months per family, and per chart review has been exhibiting both echolalia and palilalia since September/October of last year. Additionally has suffered from dysphagia to both solids and liquids. All of this " has progressed to the point of her being unable to work, and ultimately unable to care for herself.    She originally presented to OSH for bradykinesia and apathy. Received a diagnosis of catatonia and was referred to psychiatry. Seen by neurology in [facility] in 11/2022 and was again diagnosed with catatonia. Additionally seen by neurology in [place], and there was concern to autoimmune disorder, however was reportedly lost to follow-up. Currently follows in the [facility] movement disorders clinic. She has undergone an extensive outpatient work-up thus far, including testing for hakan's disease, hereditary forms of parkinson's disease, frontotemporal dementia, NMDA receptor encephalitis, autoimmune disease (GISEL, dsDNA, smith), Varun's disease, Sjorgen's: all of which appears to have been negative, aside from positive GISEL. Additionally had peripheral blood smear done, which was without acanthocytosis. MRI brain in 2023 demonstrates frontal and temporal lobe atrophy, as well as bilateral caudate and putamen atrophy. She has previously received 5 day course of pulse-dose steroids without any improvement. 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 Admitted to hospital medicine with neurology consultation.      Overview/Hospital Course:  No notes on file      Review of Systems   Unable to perform ROS: Mental status change     Objective:     Vital Signs (Most Recent):  Temp: 98.7 °F (37.1 °C) (07/29/23 1123)  Pulse: 74 (07/29/23 1124)  Resp: 18 (07/29/23 1124)  BP: 97/66 (07/29/23 1123)  SpO2: 97 % (07/29/23 1124) Vital Signs (24h Range):  Temp:  [97 °F (36.1 °C)-98.7 °F (37.1 °C)] 98.7 °F (37.1 °C)  Pulse:  [45-96] 74  Resp:  [11-18] 18  SpO2:  [96 %-100 %] 97 %  BP: ()/(59-71) 97/66     Weight: 54.4 kg (119 lb 15.9 oz)  Body mass index is 20.6  kg/m².     Physical Exam   Neurological Exam:  MENTAL STATUS  Level of consciousness: alert  Orientation: unable to answer orientation questions  Moves all four extremities spontaneously.  Blinks to threat.  Occasionally verbalizes.  Able to follow commands, including blinking, getting up to walk.  No startle myoclonus.    CRANIAL NERVES  CN III, IV, VI: PERRL, EOMI  CN V: facial sensation intact, muscles of mastication intact  CN VII: hypomimia  CN IX, X: speech, when present, is soft and monotone    MOTOR EXAM  Muscle bulk: generally diminished, though this is most evident in the bilateral upper extremities, with profound palmar atrophy  Muscle tone: rigidity, most prominent in the bilateral upper extremities (distal >proximal); no spasticity    Strength - Upper Extremities   Arm abduction Elbow flexion Elbow extension Wrist flexion Wrist extension Finger abduction   Right 4/5 4/5 4/5 4/5 4/5 3/5   Left 4/5 4/5 4/5 4/5 4/5 3/5     Strength - Lower Extremities   Hip flexion Knee flexion Knee extension Dorsiflexion Plantarflexion   Right 5/5 5/5 5/5 5/5 5/5   Left 5/5 5/5 5/5 5/5 5/5     REFLEXES   Biceps Triceps Brachioradialis Patellar Achilles   Right +2 +2 +2 +2 +2   Left +2 +2 +2 +2 +2     Planter reflex: down-going bilaterally    SENSORY EXAM  Withdraws to pain in all four extremities. Assessment of specific modalities (vibration, pin-prick, temperature, proprioception) is limited due to mental status.    COORDINATION  Tremor: none  Gait: slow gait with reduced arm swing and slow turning.           Significant Labs: All pertinent lab results from the past 24 hours have been reviewed.    Significant Imaging: I have reviewed all pertinent imaging results/findings within the past 24 hours.    Assessment and Plan:     * Neurodegenerative disorder  This is a 38 year-old female who presents as a direct admission from movement disorders clinic for work and treatment of behavioral changes accompanied by parkinsonism  and upper extremity weakness/atrophy. This has been progressively worsening since at least August 2022. Initial changes were behavioral, with reduced motivation, slowness in thought/speech, with motor symptoms (bradykinesia, subsequent upper extremity weakness and atrophy) being evident afterwards. She has received an extensive outpatient workup for inherited neurologic disease, autoimmune disease, which aside from a positive GISEL, has been unrevealing. Previously diagnosed with catatonia. MRI brain in May 2023 reviewed by myself and Dr. Camara: shows bilateral atrophy of caudate and putamen, as well as bilateral frontal and temporal lobes. 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    Plan  -CT chest, abdomen, pelvis and transvaginal ultrasound both negative for ovarian mass  -EEG is without abnormalities consistent w/CJD or NMDA receptor encephalitis  -unfortunately not enough CSF collected for studies: patient will need repeat LP: discussed w/both Dr. Alicea and Dr. Stearns: additionally discussed with family today, who are understanding  ->CSF studies: cell count and differential, protein, glucose, cytology and flow cytometry, gram stain and culture, autoimmune encephalopathy panel (ENC2 [place] sendout panel), RT-quIC ([facility] prion center sendout lab), vzv, hsv: neurology will re-order these when LP may be arranged again--very much appreciate anesthesia assistance  ->should have 32cc of CSF drawn, with opening pressure checked  ->please inform both lab and IR/anesthesia that patient is being tested for prion disease, as both have protocols for this  -->have discussed with ID, who do not need to be consulted, however have recommended the above  -will plan for plasma exchange (PLEX) following LP  -needs EMG, but most likely will be done outpatient        VTE Risk Mitigation (From admission,  onward)         Ordered     enoxaparin injection 40 mg  Daily         07/25/23 1802     IP VTE HIGH RISK PATIENT  Once         07/25/23 1802     Place sequential compression device  Until discontinued         07/25/23 1802                Amaury Collins DO  Neurology  [facility] - Neurosurgery ([facility])

## 2023-07-29 NOTE — SUBJECTIVE & OBJECTIVE
Interval History: Neurology recommends PLEX. Will request MPU procedure for central venous catheter placement for it.     Review of Systems   Constitutional:  Positive for activity change and fatigue.   Neurological:  Positive for speech difficulty. Negative for seizures.     Objective:     Vital Signs (Most Recent):  Temp: 98.7 °F (37.1 °C) (07/29/23 1123)  Pulse: 74 (07/29/23 1124)  Resp: 18 (07/29/23 1124)  BP: 97/66 (07/29/23 1123)  SpO2: 97 % (07/29/23 1124) Vital Signs (24h Range):  Temp:  [97 °F (36.1 °C)-98.7 °F (37.1 °C)] 98.7 °F (37.1 °C)  Pulse:  [45-96] 74  Resp:  [11-18] 18  SpO2:  [96 %-100 %] 97 %  BP: ()/(59-71) 97/66     Weight: 54.4 kg (119 lb 15.9 oz)  Body mass index is 20.6 kg/m².  No intake or output data in the 24 hours ending 07/29/23 1239        Physical Exam  Constitutional:       General: She is not in acute distress.     Appearance: She is well-developed and normal weight. She is not diaphoretic.   Skin:     General: Skin is warm and dry.      Coloration: Skin is not jaundiced or pale.   Neurological:      Mental Status: She is alert.   Psychiatric:         Attention and Perception: Attention normal.         Speech: She is noncommunicative.         Behavior: Behavior is not aggressive. Behavior is cooperative.             Significant Labs: All pertinent labs within the past 24 hours have been reviewed.    Significant Imaging: I have reviewed all pertinent imaging results/findings within the past 24 hours.

## 2023-07-30 ENCOUNTER — PATIENT MESSAGE (OUTPATIENT)
Dept: NEUROLOGY | Facility: CLINIC | Age: 38
End: 2023-07-30
Payer: COMMERCIAL

## 2023-07-30 PROBLEM — R91.8 GROUND GLASS OPACITY PRESENT ON IMAGING OF LUNG: Status: ACTIVE | Noted: 2023-07-26

## 2023-07-30 LAB — BACTERIA BLD CULT: NORMAL

## 2023-07-30 PROCEDURE — 99232 SBSQ HOSP IP/OBS MODERATE 35: CPT | Mod: ,,, | Performed by: HOSPITALIST

## 2023-07-30 PROCEDURE — 99233 SBSQ HOSP IP/OBS HIGH 50: CPT | Mod: ,,, | Performed by: PSYCHIATRY & NEUROLOGY

## 2023-07-30 PROCEDURE — 99233 PR SUBSEQUENT HOSPITAL CARE,LEVL III: ICD-10-PCS | Mod: ,,, | Performed by: PSYCHIATRY & NEUROLOGY

## 2023-07-30 PROCEDURE — 99232 PR SUBSEQUENT HOSPITAL CARE,LEVL II: ICD-10-PCS | Mod: ,,, | Performed by: HOSPITALIST

## 2023-07-30 PROCEDURE — 11000001 HC ACUTE MED/SURG PRIVATE ROOM

## 2023-07-30 PROCEDURE — 25000003 PHARM REV CODE 250: Performed by: HOSPITALIST

## 2023-07-30 RX ADMIN — SENNOSIDES AND DOCUSATE SODIUM 1 TABLET: 50; 8.6 TABLET ORAL at 08:07

## 2023-07-30 NOTE — NURSING
Called to room per patient's mother; patient sitting in chair at bedside, breast feeding infant and is on room air; breathing with mouth more open than usual.  When patient asked if she feels short or breath patient is able to slowly blink to indicate yes.  O2 sat = 98 %.   Mother reports patient does not have a strong cough and that she coughed a little with drinking with dinner; mother also reports CT chest done a few days ago had some opacities.  Patient is not in distress and indicates by blinking that she is feeling better.

## 2023-07-30 NOTE — PLAN OF CARE
No acute events overnight. No neurologic changes. Pending repeat LP. Neurology will continue to follow.

## 2023-07-30 NOTE — SUBJECTIVE & OBJECTIVE
Interval History: Needs repeat lumbar puncture due to some of the CSF being used for labs ordered outpatient and not having enough for the labs ordered by the Neurology consult service.     Review of Systems   Constitutional:  Positive for activity change and fatigue.   Neurological:  Positive for speech difficulty. Negative for seizures.     Objective:     Vital Signs (Most Recent):  Temp: 96.1 °F (35.6 °C) (07/30/23 1148)  Pulse: 79 (07/30/23 1148)  Resp: 16 (07/30/23 1148)  BP: 108/73 (07/30/23 1148)  SpO2: 100 % (07/30/23 1148) Vital Signs (24h Range):  Temp:  [96.1 °F (35.6 °C)-98.3 °F (36.8 °C)] 96.1 °F (35.6 °C)  Pulse:  [75-90] 79  Resp:  [16-18] 16  SpO2:  [96 %-100 %] 100 %  BP: ()/(56-73) 108/73     Weight: 54.4 kg (119 lb 15.9 oz)  Body mass index is 20.6 kg/m².    Intake/Output Summary (Last 24 hours) at 7/30/2023 1510  Last data filed at 7/30/2023 1230  Gross per 24 hour   Intake 720 ml   Output 1 ml   Net 719 ml           Physical Exam  Constitutional:       General: She is not in acute distress.     Appearance: She is well-developed and normal weight. She is not diaphoretic.   Skin:     General: Skin is warm and dry.      Coloration: Skin is not jaundiced or pale.   Neurological:      Mental Status: She is alert.   Psychiatric:         Attention and Perception: Attention normal.         Speech: She is noncommunicative.         Behavior: Behavior is not aggressive. Behavior is cooperative.             Significant Labs: All pertinent labs within the past 24 hours have been reviewed.    Significant Imaging: I have reviewed all pertinent imaging results/findings within the past 24 hours.

## 2023-07-30 NOTE — ASSESSMENT & PLAN NOTE
Family concerned. Will monitor for evidence of pneumonia. No fever or leukocytosis. No hypoxia. Her cough is weak but she has not been coughing. Can get a follow-up chest X-ray.

## 2023-07-30 NOTE — ASSESSMENT & PLAN NOTE
Neurology following. LP done but will need another to obtain more labs. CT and ultrasound showed no ovarian tumor.

## 2023-07-30 NOTE — PROGRESS NOTES
[facility] Neurosurgery [facility] Medicine  Progress Note    Patient Name: Thania Carroll  MRN: [id number]  Patient Class: IP- Inpatient   Admission Date: 2023  Length of Stay: 5 days  Attending Physician: Yohan Alicea MD  Primary Care Provider: Primary Doctor No        Subjective:     Principal Problem:Neurodegenerative disorder        HPI:  Thania Carroll is a 38 year old white woman,  2 para 2, with juvenile rheumatoid arthritis, catatonia. She lives in [place], [place]. She is . She has two children.   She presented to [facility] on 2023. She had been having progressive neurological deterioration over the past 8 months with weakness, speech changes, slowing of mentation, joint contractures of hands. She can ambulate with assistance and was cognizant of where she wanted to go in the house as her family assisted her. She was able to communicate only through yes-no questions. Her symptoms seemed to have worsened with her last pregnancy in 2022, though symptoms started prior to it. She first noticed she had trouble using her hands when holding her baby in May 2022 and told her mother about it in  or July. Her family noticed that she started slowing down in early . She took longer to wash her hair, had trouble orienting things, had trouble holding onto things and losing her , then noticed she was moving more slowly in general. She underwent two neurologic workups, one at [facility] and the other in [place] with neurologist Dr. Franco. She was diagnosed with catatonia at [facility]. She was referred to Psychiatry. Dr. Franco performed lumbar puncture. She was hospitalized in 2023 for further workup. Dr. Franco suspected an autoimmune disorder. She underwent an extended electroencephalogram at home that was normal. Her hands started to contract. She started having echolalia  (meaningles repetition) in September/October as well as palilalia (involuntary repeating words). She had staring episodes that lasted a minute or two. She noted more muscle atrophy in November 2022. She delivered in May 2023 and was still breastfeeding an eight week old baby. She had no speech for the last 3 months. Had had several falls over the past week. Her last fall was 3 days ago. She started having emotional lability in October/November 2022.  She was seen at [facility] Neurology clinic on 7/24/2023. The only medication she had ever been on was prednisone in January with no improvement, methylprednisolone with no response, and methotrexate. She was seen by Nohemy Brady PA-C and Dr. Harshal Lyons, who recommended lumbar puncture with encephalopathy panel to send to [facility] and movement panel to rule out NMDAr encephalitis and 14-3-3, total tau RT-QulC, neurofilament light chain, inpatient IVIG and methylprednisolone, electroencephalogram to evaluate for delta brush, electromyelogram. Her father noticed intermittent choking spells when eating.   Clinic and emergency department evaluations:  Cbc. Cmp. Cpk, lipid panel- nl  Heavy metal screen 6/2023 - nl  TFTs -nl  Movement Disorder, Autoimmune pannel, cerruloplasm, negative  NA 1:160  UPT negative  Trep Ab bucky  U tox neg  [place]'s D - neg  Lyme - neg  Hereditary Parkinson's - neg  CT head 1/2023  MRI head - done 6/15.23, some atrophy out of proportion for age, particularly in bilateral caudate and putamen noted by  Nohemy Brady PA-C.    She was admitted to Hospital Medicine Team N.      Overview/Hospital Course:  CT chest abdomen pelvis was done to assess for mass, particularly ovarian teratoma, which may be associated with autoimmune encephalitis (especially NMDA receptor encephalitis). It showed bilateral ground glass lung attenuation but did not show an ovarian tumor, so transvaginal ultrasound was done and also  showed no ovarian mass. She was put on continuous EEG to assess for abnormalities seen in NMDA receptor encephalitis (ie delta brush) vs abnormalities seen in other conditions such as Creutzfeld-Tripp Disease. Infection Control was consulted due to testing for prion disease. Fluoroscopy was consulted for lumbar puncture with opening pressure and CSF studies ordered: cell count and differential, protein, glucose, cytology and flow cytometry, gram stain and culture, autoimmune encephalopathy panel (ENC2 [place] sendout panel), RT-quIC ([facility] prion center sendout lab). Neurology planned plasma exchange (PLEX) following lumbar puncture, and electromyography, likely outpatient. Lumbar puncture was done on 7/28/2023. CSF was used for labs ordered by the physician assistant she had seen in Neurology clinic rather than the labs ordered by the inpatient consult service, which made another lumbar puncture necessary before treatment.       Interval History: Needs repeat lumbar puncture due to some of the CSF being used for labs ordered outpatient and not having enough for the labs ordered by the Neurology consult service.     Review of Systems   Constitutional:  Positive for activity change and fatigue.   Neurological:  Positive for speech difficulty. Negative for seizures.     Objective:     Vital Signs (Most Recent):  Temp: 96.1 °F (35.6 °C) (07/30/23 1148)  Pulse: 79 (07/30/23 1148)  Resp: 16 (07/30/23 1148)  BP: 108/73 (07/30/23 1148)  SpO2: 100 % (07/30/23 1148) Vital Signs (24h Range):  Temp:  [96.1 °F (35.6 °C)-98.3 °F (36.8 °C)] 96.1 °F (35.6 °C)  Pulse:  [75-90] 79  Resp:  [16-18] 16  SpO2:  [96 %-100 %] 100 %  BP: ()/(56-73) 108/73     Weight: 54.4 kg (119 lb 15.9 oz)  Body mass index is 20.6 kg/m².    Intake/Output Summary (Last 24 hours) at 7/30/2023 1510  Last data filed at 7/30/2023 1230  Gross per 24 hour   Intake 720 ml   Output 1 ml   Net 719 ml           Physical Exam  Constitutional:       General: She  is not in acute distress.     Appearance: She is well-developed and normal weight. She is not diaphoretic.   Skin:     General: Skin is warm and dry.      Coloration: Skin is not jaundiced or pale.   Neurological:      Mental Status: She is alert.   Psychiatric:         Attention and Perception: Attention normal.         Speech: She is noncommunicative.         Behavior: Behavior is not aggressive. Behavior is cooperative.             Significant Labs: All pertinent labs within the past 24 hours have been reviewed.    Significant Imaging: I have reviewed all pertinent imaging results/findings within the past 24 hours.      Assessment/Plan:      * Neurodegenerative disorder  Neurology following. LP done but will need another to obtain more labs. CT and ultrasound showed no ovarian tumor.    Ground glass opacity present on imaging of lung  Family concerned. Will monitor for evidence of pneumonia. No fever or leukocytosis. No hypoxia. Her cough is weak but she has not been coughing. Can get a follow-up chest X-ray.     Oropharyngeal dysphagia  SLP. Dental soft diet.    Breastfeeding (infant)  Lactation nurse consulted. Patient is breastfeeding. Can call the [facility] [phone] if positive for prion disease.    Juvenile rheumatoid arthritis  Chronic, stable.  GISEL :160, autoimmune panel is negative  Rheu Factor Neg        VTE Risk Mitigation (From admission, onward)         Ordered     enoxaparin injection 40 mg  Daily         07/25/23 1802     IP VTE HIGH RISK PATIENT  Once         07/25/23 1802     Place sequential compression device  Until discontinued         07/25/23 1802                Discharge Planning   ALANNA: 8/1/2023     Code Status: Full Code   Is the patient medically ready for discharge?: No    Reason for patient still in hospital (select all that apply): Laboratory test and Consult recommendations  Discharge Plan A: Home, Home with family   Discharge Delays: None known at this  time              Yohan Alicea MD  Department of Hospital Medicine   [facility] - Neurosurgery ([facility])

## 2023-07-30 NOTE — PLAN OF CARE
Problem: Adult Inpatient Plan of Care  Goal: Plan of Care Review  Outcome: Ongoing, Progressing  Goal: Patient-Specific Goal (Individualized)  Outcome: Ongoing, Progressing  Goal: Absence of Hospital-Acquired Illness or Injury  Outcome: Ongoing, Progressing  Goal: Optimal Comfort and Wellbeing  Outcome: Ongoing, Progressing  Goal: Readiness for Transition of Care  Outcome: Ongoing, Progressing     Problem: Fall Injury Risk  Goal: Absence of Fall and Fall-Related Injury  Outcome: Ongoing, Progressing     Problem: Seizure, Active Management  Goal: Absence of Seizure/Seizure-Related Injury  Outcome: Ongoing, Progressing     Problem: Infection  Goal: Absence of Infection Signs and Symptoms  Outcome: Ongoing, Progressing     Problem: Skin Injury Risk Increased  Goal: Skin Health and Integrity  Outcome: Ongoing, Progressing   Review POC; progressing; ambulated in neff this shift and sat in chair most of shift.  Case request ordered today for LP.

## 2023-07-31 ENCOUNTER — TELEPHONE (OUTPATIENT)
Dept: NEUROLOGY | Facility: CLINIC | Age: 38
End: 2023-07-31
Payer: COMMERCIAL

## 2023-07-31 PROBLEM — R25.8 BRADYKINESIA: Status: ACTIVE | Noted: 2023-07-31

## 2023-07-31 LAB
HSV1, PCR, CSF: NEGATIVE
HSV2, PCR, CSF: NEGATIVE
NEUROFILAMENT LIGHT CHAIN, PLASMA: 67.4 PG/ML
VDRL CSF QL: NEGATIVE

## 2023-07-31 PROCEDURE — 11000001 HC ACUTE MED/SURG PRIVATE ROOM

## 2023-07-31 PROCEDURE — 99233 PR SUBSEQUENT HOSPITAL CARE,LEVL III: ICD-10-PCS | Mod: ,,, | Performed by: HOSPITALIST

## 2023-07-31 PROCEDURE — 97535 SELF CARE MNGMENT TRAINING: CPT

## 2023-07-31 PROCEDURE — 97530 THERAPEUTIC ACTIVITIES: CPT

## 2023-07-31 PROCEDURE — 92523 SPEECH SOUND LANG COMPREHEN: CPT

## 2023-07-31 PROCEDURE — 99233 SBSQ HOSP IP/OBS HIGH 50: CPT | Mod: ,,, | Performed by: HOSPITALIST

## 2023-07-31 PROCEDURE — 25000003 PHARM REV CODE 250: Performed by: HOSPITALIST

## 2023-07-31 RX ADMIN — SENNOSIDES AND DOCUSATE SODIUM 1 TABLET: 50; 8.6 TABLET ORAL at 08:07

## 2023-07-31 NOTE — PROGRESS NOTES
[facility] Neurosurgery [place] [facility] Medicine  Progress Note    Patient Name: Thania Carroll  MRN: [id number]  Patient Class: IP- Inpatient   Admission Date: 2023  Length of Stay: 6 days  Attending Physician: Yohan Alicea MD  Primary Care Provider: Primary Doctor No        Subjective:     Principal Problem:Neurodegenerative disorder        HPI:  Thania Carroll is a 38 year old white woman,  2 para 2, with juvenile rheumatoid arthritis, catatonia. She lives in [place], [place]. She is . She has two children.   She presented to [facility] Emergency Department on 2023. She had been having progressive neurological deterioration over the past 8 months with weakness, speech changes, slowing of mentation, joint contractures of hands. She can ambulate with assistance and was cognizant of where she wanted to go in the house as her family assisted her. She was able to communicate only through yes-no questions. Her symptoms seemed to have worsened with her last pregnancy in 2022, though symptoms started prior to it. She first noticed she had trouble using her hands when holding her baby in May 2022 and told her mother about it in  or July. Her family noticed that she started slowing down in early . She took longer to wash her hair, had trouble orienting things, had trouble holding onto things and losing her , then noticed she was moving more slowly in general. She underwent two neurologic workups, one at [facility] ([facility]) and the other in [place] with neurologist Dr. Franco. She was diagnosed with catatonia at [facility]. She was referred to Psychiatry. Dr. Franco performed lumbar puncture. She was hospitalized in 2023 for further workup. Dr. Franco suspected an autoimmune disorder. She underwent an extended electroencephalogram at home that was normal. Her hands started to contract. She started having echolalia  (meaningles repetition) in September/October as well as palilalia (involuntary repeating words). She had staring episodes that lasted a minute or two. She noted more muscle atrophy in November 2022. She delivered in May 2023 and was still breastfeeding an eight week old baby. She had no speech for the last 3 months. Had had several falls over the past week. Her last fall was 3 days ago. She started having emotional lability in October/November 2022.  She was seen at [facility] Neurology clinic on 7/24/2023. The only medication she had ever been on was prednisone in January with no improvement, methylprednisolone with no response, and methotrexate. She was seen by Nohemy Brady PA-C and Dr. Harshal Lyons, who recommended lumbar puncture with encephalopathy panel to send to [facility] and movement panel to rule out NMDAr encephalitis and 14-3-3, total tau RT-QulC, neurofilament light chain, inpatient IVIG and methylprednisolone, electroencephalogram to evaluate for delta brush, electromyelogram. Her father noticed intermittent choking spells when eating.   Clinic and emergency department evaluations:  Cbc. Cmp. Cpk, lipid panel- nl  Heavy metal screen 6/2023 - nl  TFTs -nl  Movement Disorder, Autoimmune pannel, cerruloplasm, negative  NA 1:160  UPT negative  Trep Ab bucky  U tox neg  [place]'s D - neg  Lyme - neg  Hereditary Parkinson's - neg  CT head 1/2023  MRI head - done 6/15.23, some atrophy out of proportion for age, particularly in bilateral caudate and putamen noted by  Nohemy Brady PA-C.    She was admitted to Hospital Medicine Team N.      Overview/Hospital Course:  CT chest abdomen pelvis was done to assess for mass, particularly ovarian teratoma, which may be associated with autoimmune encephalitis (especially NMDA receptor encephalitis). It showed bilateral ground glass lung attenuation but did not show an ovarian tumor, so transvaginal ultrasound was done and also  showed no ovarian mass. She was put on continuous EEG to assess for abnormalities seen in NMDA receptor encephalitis (ie delta brush) vs abnormalities seen in other conditions such as Creutzfeld-Tripp Disease. Infection Control was consulted due to testing for prion disease. Fluoroscopy was consulted for lumbar puncture with opening pressure and CSF studies ordered: cell count and differential, protein, glucose, cytology and flow cytometry, gram stain and culture, autoimmune encephalopathy panel ([facility] [place] sendout panel), RT-quIC ([facility] prion [place] sendout lab). Neurology planned plasma exchange (PLEX) following lumbar puncture, and electromyography, likely outpatient. Lumbar puncture was done on 7/28/2023. CSF was used for labs ordered by the physician assistant she had seen in Neurology clinic rather than the labs ordered by the inpatient consult service, which made another lumbar puncture necessary before treatment.       Interval History: Needs repeat lumbar puncture.    Review of Systems   Constitutional:  Positive for activity change and fatigue.   Neurological:  Positive for speech difficulty. Negative for seizures.     Objective:     Vital Signs (Most Recent):  Temp: 97.4 °F (36.3 °C) (07/31/23 0733)  Pulse: 94 (07/31/23 0733)  Resp: 18 (07/31/23 0733)  BP: 123/70 (07/31/23 0733)  SpO2: 98 % (07/31/23 0733) Vital Signs (24h Range):  Temp:  [96.1 °F (35.6 °C)-98.9 °F (37.2 °C)] 97.4 °F (36.3 °C)  Pulse:  [60-94] 94  Resp:  [16-18] 18  SpO2:  [96 %-100 %] 98 %  BP: ()/(54-73) 123/70     Weight: 54.4 kg (119 lb 15.9 oz)  Body mass index is 20.6 kg/m².    Intake/Output Summary (Last 24 hours) at 7/31/2023 0943  Last data filed at 7/30/2023 1230  Gross per 24 hour   Intake 240 ml   Output --   Net 240 ml           Physical Exam  Constitutional:       General: She is not in acute distress.     Appearance: She is well-developed and normal weight. She is not diaphoretic.   Pulmonary:      Effort: Pulmonary  effort is normal. No respiratory distress.   Skin:     General: Skin is warm and dry.      Coloration: Skin is not jaundiced or pale.   Neurological:      Mental Status: She is alert.      Cranial Nerves: No facial asymmetry.      Motor: No tremor or seizure activity.   Psychiatric:         Attention and Perception: Attention normal.         Speech: She is noncommunicative.         Behavior: Behavior is not aggressive. Behavior is cooperative.             Significant Labs: All pertinent labs within the past 24 hours have been reviewed.    Significant Imaging: I have reviewed all pertinent imaging results/findings within the past 24 hours.      Assessment/Plan:      * Neurodegenerative disorder  Neurology following. CT and ultrasound showed no ovarian tumor. LP done but will need another to obtain more labs. MPU request ordered.    Ground glass opacity present on imaging of lung  Family concerned. Will monitor for evidence of pneumonia. No fever or leukocytosis. No hypoxia. Her cough is weak but she has not been coughing. Can get a follow-up chest X-ray.     Oropharyngeal dysphagia  SLP. Dental soft diet.    Breastfeeding (infant)  Lactation nurse consulted. Patient is breastfeeding. Can call the [facility] [phone] if positive for prion disease.    Juvenile rheumatoid arthritis  Chronic, stable.  GISEL :160, autoimmune panel is negative  Rheu Factor Neg      VTE Risk Mitigation (From admission, onward)         Ordered     enoxaparin injection 40 mg  Daily         07/25/23 1802     IP VTE HIGH RISK PATIENT  Once         07/25/23 1802     Place sequential compression device  Until discontinued         07/25/23 1802                Discharge Planning   ALANNA: 8/2/2023     Code Status: Full Code   Is the patient medically ready for discharge?: No    Reason for patient still in hospital (select all that apply): Laboratory test and Consult recommendations  Discharge Plan A: Home, Home with family   Discharge  Delays: None known at this time              Yohan Alicea MD  Department of Hospital Medicine   [facility] - Neurosurgery ([facility])

## 2023-07-31 NOTE — ANESTHESIA POSTPROCEDURE EVALUATION
Anesthesia Post Evaluation    Patient: Thania Carroll    Procedure(s) Performed: Procedure(s) (LRB):  Lumbar Puncture (N/A)    Final Anesthesia Type: general      Patient location during evaluation: PACU  Patient participation: Yes- Able to Participate  Level of consciousness: awake and alert  Post-procedure vital signs: reviewed and stable  Pain management: adequate  Airway patency: patent  ROMERO mitigation strategies: Multimodal analgesia and Extubation and recovery carried out in lateral, semiupright, or other nonsupine position  PONV status at discharge: No PONV  Anesthetic complications: no      Cardiovascular status: stable  Respiratory status: unassisted and spontaneous ventilation  Hydration status: euvolemic  Follow-up not needed.          Vitals Value Taken Time   /70 07/31/23 0733   Temp 36.3 °C (97.4 °F) 07/31/23 0733   Pulse 94 07/31/23 0733   Resp 18 07/31/23 0733   SpO2 98 % 07/31/23 0733         Event Time   Out of Recovery 07/28/2023 18:15:00         Pain/Becca Score: No data recorded

## 2023-07-31 NOTE — TELEPHONE ENCOUNTER
----- Message from Simonlaytonelmira MCCONNELL Route sent at 7/31/2023  4:27 PM CDT -----  Regarding: Speak With Provider  Contact: [phone]  Cindy mother of pt is calling in ref to speaking to provider about the Lumbard Puncture being done on today. Patient Requesting Call Back @ [phone]

## 2023-07-31 NOTE — PLAN OF CARE
07/31/23 1443   Discharge Reassessment   Assessment Type Discharge Planning Reassessment   Did the patient's condition or plan change since previous assessment?   (waiting on test)   Discharge Plan discussed with: Patient;Parent(s)   Name(s) and Number(s) Cindy Lopes [phone]   Discharge Plan A Home with family;Home Health   Discharge Plan B Home with family;Other  (outpatient therapy)   DME Needed Upon Discharge  none   Why the patient remains in the hospital Requires continued medical care     Cm received a message  this morning from [employer] [phone]. CM called back but had to leave a message.   Mother at bedside. Patient sitting in chair. Mother states that patient is scheduled for another LP today. Discharge plan is for patient to go home w/ mother per mother. Waiting on test results. Evelin Angulo, RN

## 2023-07-31 NOTE — ASSESSMENT & PLAN NOTE
Neurology following. CT and ultrasound showed no ovarian tumor. LP done but will need another to obtain more labs. MPU request ordered.

## 2023-07-31 NOTE — PT/OT/SLP PROGRESS
Occupational Therapy   Treatment    Name: Thania aCrroll  MRN: [id number]  Admitting Diagnosis:  Neurodegenerative disorder  3 Days Post-Op    Recommendations:     Discharge Recommendations: other (see comments)  Discharge Equipment Recommendations:  shower chair  Barriers to discharge:  Other (Comment)    Assessment:     Thania Carroll is a 38 y.o. female with a medical diagnosis of Neurodegenerative disorder.  She presents with decreased functional status post medical dx. Performance deficits affecting function are weakness, impaired endurance, impaired sensation, impaired self care skills, impaired functional mobility, gait instability, impaired balance, impaired cognition, decreased coordination, decreased upper extremity function, decreased lower extremity function, decreased safety awareness, decreased ROM, impaired coordination, impaired fine motor.     Rehab Prognosis:  Fair; patient would benefit from acute skilled OT services to address these deficits and reach maximum level of function.       Plan:     Patient to be seen 4 x/week to address the above listed problems via self-care/home management, neuromuscular re-education, therapeutic exercises, therapeutic activities  Plan of Care Expires: 08/28/23  Plan of Care Reviewed with: patient, father, mother    Subjective     Chief Complaint: Patient family wanting her to breast feed independently   Patient/Family Comments/goals: Gain functional status     Objective:     Communicated with: RN prior to session.  Patient found supine with telemetry upon OT entry to room.    General Precautions: Standard, aspiration, fall    Orthopedic Precautions:N/A  Braces: N/A  Respiratory Status: Room air     Occupational Performance:     Bed Mobility:    Patient completed Rolling/Turning to Right with contact guard assistance  Patient completed Scooting/Bridging with contact guard assistance  Patient completed Supine to Sit with contact guard assistance     Functional  Mobility/Transfers:  Patient completed Sit <> Stand Transfer with contact guard assistance  with  no assistive device   Functional Mobility: patient completed functional ambulation with CGA     Activities of Daily Living:  Feeding:  minimum assistance sitting in chair [place] assist       [facility] 6 Click ADL: 12    Treatment & Education:  Patient family demonstrated breast feeding technique for recommendations during OT session. Patient/family utilizes recliner chair and supportive pillow to successfully accomplish task. OT Cassie Hernandezompte was asked by assigned OT to join session for insight and suggestions for patient. Overall, technique was found to be functional solutions needed due to patient decreased functional status in BUE. OT then completed ROM tasks for BUE to encourage functional return. Patient then performed grooming and feeding via [place] assistance to practice functional movement patterns with dominant hand. OT educated family on technique and encouraged family to perform PROM with patient while in room.    Patient left up in chair with all lines intact, bed alarm on, and mother present    GOALS:   Multidisciplinary Problems       Occupational Therapy Goals          Problem: Occupational Therapy    Goal Priority Disciplines Outcome Interventions   Occupational Therapy Goal     OT, PT/OT Ongoing, Progressing    Description: Goals to be met by: 8/9/23     Patient will increase functional independence with ADLs by performing:    Feeding with Moderate Assistance.  UE Dressing with Moderate Assistance.  LE Dressing with Maximum Assistance.  Grooming while standing at sink with Moderate Assistance.  Toileting from toilet with Moderate Assistance for hygiene and clothing management.   Toilet transfer to toilet with Contact Guard Assistance.  Upper extremity exercise program (PROM of BUE) x20 reps, with independence.                         Time Tracking:     OT Date of Treatment: 07/31/23  OT Start Time: 0822  OT Stop  Time: 0900  OT Total Time (min): 38 min    Billable Minutes:Self Care/Home Management 28 minutes   Therapeutic Activity 10 minutes     OT/MIA: OT          7/31/2023

## 2023-07-31 NOTE — PT/OT/SLP EVAL
Speech Language Pathology Evaluation  Cognitive Communication    Patient Name:  Thania Carroll   MRN:  [id number]  Admitting Diagnosis: Neurodegenerative disorder    Recommendations:     Recommendations:                General Recommendations:  Dysphagia therapy, Speech/language therapy, and Modified barium swallow study when feasible  Diet recommendations:  Dental Soft, Thin   Aspiration precautions    only when vital signs stable, 1 bite/sip at a time, Assistance with meals, Avoid talking while eating, Eliminate distractions, Feed only when awake/alert/attentive, Frequent oral care, HOB to 90 degrees,  Monitor for s/s of aspiration, Small bites/sips, and Strict aspiration precautions Continue to monitor for signs and symptoms of aspiration and discontinue oral feeding should you notice any of the following: watery eyes, reddened facial area, wet vocal quality, increased work of breathing, change in respiratory status, increased congestion, coughing, fever and/or change in level of alertness.  General Precautions: Standard, aspiration, fall  Communication strategies:  provide increased time to answer, go to room if call light pushed, and use yes/no questions to clarify     Assessment:     Thania Carroll is a 38 y.o. female with an SLP diagnosis of Dysphagia, Dysarthria, and Cognitive-Linguistic Impairment.  She benefits from extra time to respond to yes/no questions. Patient NPO for pending procedure upon attempts this service day.     History:     Past Medical History:   Diagnosis Date    Catatonia     Juvenile rheumatoid arthritis        Past Surgical History:   Procedure Laterality Date    LUMBAR PUNCTURE N/A 7/28/2023    Procedure: Lumbar Puncture;  Surgeon: Isabel Surgeon;  Location: [facility];  Service: Anesthesiology;  Laterality: N/A;       Social History: Patient lives with family in [place], [place]. Roles include Spouse, Mother  (2 year old son, 8 week old daughter,) Daughter. Family reports assisting Pt with  "ADLs    Prior Intubation HX:  none this admission     Modified Barium Swallow: none prior at this facility     Chest X-Rays: none recent  CT chest: 7/26/23    Prior diet: regular textures, thin liquids.    Subjective     SLP reviewed Pt with RN  Pt presents calm  She explains, "Shanae"  Patient's mother explains, "She is getting tired"     Pain/Comfort:  Pain Rating 1: other (see comments) (did not rate)  Location - Side 1: Right  Location - Orientation 1: generalized  Location 1: arm  Pain Addressed 1: Nurse notified    Respiratory Status: Room air    Objective:     Cognitive Status:    Pt alert with variable response time, sometimes delayed. She is oriented to person, place and KARLY via yes/no questions. Additional assessment cognitive skills for memory, problem solving, math and reasoning limited due to decreased verbal expression, receptive language and endurance. Ongoing assessment warranted in session.        Receptive Language:   Comprehension:      Questions Simple yes/no 88% accuracy provided extra time   Complex yes/no TBA  Commands  One step 60% accuracy provided extra time     Pragmatics:    Pt with flat affect, decreased eye contact     Expressive Language:  Verbal:    Automatic Speech  Counting : Pt completed counting tasks #1-10 in unison with SLP , Days of the week 86% accuracy provided phonemic cue to initiate, and Phrase completion 33% accuracy I'ly  Initiation : inconsistent, at times delayed  Repetition Words : unable to elicit   Conversational speech :  Pt with short utterance length, delayed initiation and some perseveration with minimal verbal expression elicited this service day.  Ongoing assessment warranted.        Motor Speech:  Dysarthria : Patient with fast rate of speech and decreased breath support t/o minimal verbal expression elicited. Ongoing assessment warranted due to minimal verbal expression elicited.      Voice:   Quality Breathy  Intensity : " low    Visual-Spatial:  TBA    Reading:   TBA      Written Expression:   TBA    Treatment: Pt found asleep in bed with family (Mother, daughter) at the bedside. Pt's mother repositioned Pt upright in chair in room as SLP initiated session. Pt's mother inquired about NPO status. RN in room to review questions with family and confirmed Pt NPO for pending testing.  PO trials deferred due to NPO status. Pt with increased lethargy as assessment continued. SLP educated Pt and Mother on compensatory strategies for communication and SLP POC including possible further objective assessment via MBSS as feasible.  No additional questions noted. Pt did not demonstrate understanding. Patient's mother verbalized understanding. Pt remained in chair with her Mother and Daughter in the room upon SLP exit .    Goals:   Multidisciplinary Problems       SLP Goals          Problem: SLP    Goal Priority Disciplines Outcome   SLP Goal     SLP Ongoing, Progressing   Description: Speech Language Pathology Goals  Goals expected to be met by 8/2/23    1. Pt will participate in ongoing assessment of swallow function to determine safest, least restrictive means of nutrition/hydration  2. Educate Pt and family on aspiration precautions and SLP POC                         Plan:   Patient to be seen:  4 x/week   Plan of Care expires:  08/25/23  Plan of Care reviewed with:  patient, mother   SLP Follow-Up:  Yes       Discharge recommendations:  Discharge Facility/Level of Care Needs: outpatient speech therapy       Time Tracking:     SLP Treatment Date:   07/31/23  Speech Start Time:  1432  Speech Stop Time:  1459     Speech Total Time (min):  27 min    Billable Minutes: Eval 10  and Self Care/Home Management Training 15    07/31/2023

## 2023-07-31 NOTE — SUBJECTIVE & OBJECTIVE
Interval History: Needs repeat lumbar puncture.    Review of Systems   Constitutional:  Positive for activity change and fatigue.   Neurological:  Positive for speech difficulty. Negative for seizures.     Objective:     Vital Signs (Most Recent):  Temp: 97.4 °F (36.3 °C) (07/31/23 0733)  Pulse: 94 (07/31/23 0733)  Resp: 18 (07/31/23 0733)  BP: 123/70 (07/31/23 0733)  SpO2: 98 % (07/31/23 0733) Vital Signs (24h Range):  Temp:  [96.1 °F (35.6 °C)-98.9 °F (37.2 °C)] 97.4 °F (36.3 °C)  Pulse:  [60-94] 94  Resp:  [16-18] 18  SpO2:  [96 %-100 %] 98 %  BP: ()/(54-73) 123/70     Weight: 54.4 kg (119 lb 15.9 oz)  Body mass index is 20.6 kg/m².    Intake/Output Summary (Last 24 hours) at 7/31/2023 0943  Last data filed at 7/30/2023 1230  Gross per 24 hour   Intake 240 ml   Output --   Net 240 ml           Physical Exam  Constitutional:       General: She is not in acute distress.     Appearance: She is well-developed and normal weight. She is not diaphoretic.   Pulmonary:      Effort: Pulmonary effort is normal. No respiratory distress.   Skin:     General: Skin is warm and dry.      Coloration: Skin is not jaundiced or pale.   Neurological:      Mental Status: She is alert.      Cranial Nerves: No facial asymmetry.      Motor: No tremor or seizure activity.   Psychiatric:         Attention and Perception: Attention normal.         Speech: She is noncommunicative.         Behavior: Behavior is not aggressive. Behavior is cooperative.             Significant Labs: All pertinent labs within the past 24 hours have been reviewed.    Significant Imaging: I have reviewed all pertinent imaging results/findings within the past 24 hours.

## 2023-08-01 ENCOUNTER — ANESTHESIA EVENT (OUTPATIENT)
Dept: ENDOSCOPY | Facility: HOSPITAL | Age: 38
DRG: 057 | End: 2023-08-01
Payer: COMMERCIAL

## 2023-08-01 LAB — WNV RNA CSF QL NAA+PROBE: NEGATIVE

## 2023-08-01 PROCEDURE — 25000003 PHARM REV CODE 250: Performed by: HOSPITALIST

## 2023-08-01 PROCEDURE — 97116 GAIT TRAINING THERAPY: CPT

## 2023-08-01 PROCEDURE — 11000001 HC ACUTE MED/SURG PRIVATE ROOM

## 2023-08-01 PROCEDURE — 99233 SBSQ HOSP IP/OBS HIGH 50: CPT | Mod: ,,, | Performed by: HOSPITALIST

## 2023-08-01 PROCEDURE — 99233 PR SUBSEQUENT HOSPITAL CARE,LEVL III: ICD-10-PCS | Mod: ,,, | Performed by: HOSPITALIST

## 2023-08-01 RX ORDER — SODIUM CHLORIDE 9 MG/ML
INJECTION, SOLUTION INTRAVENOUS CONTINUOUS
Status: ACTIVE | OUTPATIENT
Start: 2023-08-01 | End: 2023-08-01

## 2023-08-01 RX ADMIN — SODIUM CHLORIDE: 9 INJECTION, SOLUTION INTRAVENOUS at 02:08

## 2023-08-01 NOTE — ASSESSMENT & PLAN NOTE
Patient opts to continue breastfeeding. Can call the [facility] [phone] if positive for prion disease.

## 2023-08-01 NOTE — PLAN OF CARE
Plan of care note regarding Miss Carroll.     Today, the neurology team had an extensive conversation with the patient's family ( included via phone) regarding the plan.     Family stated that they were very concerned about their financial situation and wanted to make sure that all the tests that we are doing are actually necessary. They also stated that they were concerned about her getting a 2nd LP with anesthesia because that would raise the cost even more. Family expressed to me how they want her loved one to get better but they are very concerned about financial situation.     I stated that the 2nd lumbar puncture is needed for us to rule out other potential ethologies. We also talked about how since PLEX is an invasive treatment with significant side effects, we needed more clarity in our potential differential diagnosis before treating her condition. They verbalized understanding     After this conversation, I relayed this information to primary attending, Dr Vale. I suggested getting patient relations and financial aid/case management on board.     I was notified around noon that they family had a meeting with case management and had decided to proceed with the LP. Radiology team notified. CSF studies ordered.     Neurology will continue to follow. Please call if any questions or concerns.

## 2023-08-01 NOTE — ANESTHESIA PREPROCEDURE EVALUATION
[facility]  Anesthesia Pre-Operative Evaluation         Patient Name: Thania Carroll  YOB: 1985  MRN: [id number]    SUBJECTIVE:     Pre-operative evaluation for Procedure(s) (LRB):  Lumbar Puncture (N/A)     08/01/2023    Tahnia Carroll is a 38 y.o. female w/ a significant PMHx of juvenile RA, 2 pregnancies w/ recent birth of child 5/2023, currently breastfeeding presenting with behavioral and motor changes including bradykinesia, apathy with extensive neurological workup in progress including for potential prion disease/CJD.     Patient has had a LP under sedation performed last Friday in fluoroscopy. Neurology is requesting 32cc of CSF collected with opening pressures    Anesthesia E-Consent completed with patient's mother Cindy Lopes at bedside.     LDA:        Peripheral IV - Single Lumen 07/25/23 1351 20 G Left Antecubital (Active)       Patient Active Problem List   Diagnosis    Neurodegenerative disorder    Juvenile rheumatoid arthritis    Breastfeeding (infant)    Oropharyngeal dysphagia    Ground glass opacity present on imaging of lung    Bradykinesia       Review of patient's allergies indicates:  No Known Allergies    Current Inpatient Medications:       Current Outpatient Medications   Medication Instructions    methylPREDNISolone sodium succinate (SOLU-MEDROL) 1,000 mg injection MIX 1g (1 SYRINGE) into a smoothie to take once daily for 5 doses       Surgical History  Past Surgical History:   Procedure Laterality Date    LUMBAR PUNCTURE N/A 7/28/2023    Procedure: Lumbar Puncture;  Surgeon: Isabel Surgeon;  Location: [facility];  Service: Anesthesiology;  Laterality: N/A;       Social History:  Tobacco Use: Not on file     Alcohol Use: Not At Risk (7/26/2023)    AUDIT-C     Frequency of Alcohol Consumption: Never     Average Number of Drinks: Patient does not drink     Frequency of Binge Drinking: Never         OBJECTIVE:     Vital Signs Range (Last  24H):  Temp:  [36.1 °C (97 °F)-37.1 °C (98.7 °F)]   Pulse:  [65-98]   Resp:  [16-19]   BP: ()/(63-74)   SpO2:  [96 %-100 %]       Significant Labs:  Lab Results   Component Value Date    WBC 5.85 07/28/2023    HGB 13.8 07/28/2023    HCT 41.8 07/28/2023     07/28/2023    INR 1.0 07/27/2023       Lab Results   Component Value Date     07/28/2023    K 3.8 07/28/2023     07/28/2023    CREATININE 0.8 07/28/2023    BUN 17 07/28/2023    CO2 22 (L) 07/28/2023       Lab Results   Component Value Date    TSH 0.452 01/03/2023       EKG:   No results found for this or any previous visit.    ASSESSMENT/PLAN:         Pre-op Assessment    I have reviewed the Patient Summary Reports.     I have reviewed the Nursing Notes. I have reviewed the NPO Status.   I have reviewed the Medications.     Review of Systems  Anesthesia Hx:  Denies Family Hx of Anesthesia complications.   Denies Personal Hx of Anesthesia complications.       Physical Exam  General: Well nourished and Confusion    Airway:  Mallampati: III   Mouth Opening: Small, but > 3cm  Neck ROM: Normal ROM    Dental:  Intact        Anesthesia Plan  Type of Anesthesia, risks & benefits discussed:    Anesthesia Type: MAC, Gen Natural Airway  Intra-op Monitoring Plan: Standard ASA Monitors  Post Op Pain Control Plan: multimodal analgesia  Induction:  IV  Informed Consent: Informed consent signed with the Patient representative and all parties understand the risks and agree with anesthesia plan.  All questions answered.   ASA Score: 3  Day of Surgery Review of History & Physical: H&P Update referred to the surgeon/provider.    Ready For Surgery From Anesthesia Perspective.     .

## 2023-08-01 NOTE — PLAN OF CARE
Patient awake, no change in status. Family at bedside refusing Spinal Tap and NPO diet. Vitals stable, able to swallow pills and take sips of water.   Wll continue to monitor for safety.   Stool softener administered with no complications.Patient able to swallow pill with due precautions. Father at bedside.

## 2023-08-01 NOTE — PT/OT/SLP PROGRESS
"Physical Therapy Treatment    Patient Name:  Thania Carroll   MRN:  [id number]    Recent Surgery: Procedure(s) (LRB):  Lumbar Puncture (N/A) 4 Days Post-Op    Recommendations:     Discharge Recommendations:  other (see comments)   Discharge Equipment Recommendations: shower chair   Barriers to discharge: None    Highest Level of Mobility: Pt ascended and descended 2 steps   Assistance Required: Mod(A) w/ no HRs    Assessment:     Thania Carroll is a 38 y.o. female admitted with a medical diagnosis of Neurodegenerative disorder.    Pt met with HOB elevated, family present and agreeable to PT treatment. Today's PT treatment focus was on continued gait training with added stair navigation to improve function. Pt follows no commands and is impulsive with all mobility. She requires CGA-min(A) to ambulate and mod(A) for stair navigation. Pt with a significant posterior lean and "push-off" on stairs leading to increased posterior LOB. She is a high fall risk at this time.     Pt is progressing towards acute PT goals appropriately and continues to benefit from acute PT sessions.     Rehab Prognosis: Good; patient would benefit from acute skilled PT services to address these deficits and reach maximum level of function.      Plan:     During this hospitalization, patient to be seen 3 x/week to address the identified rehab impairments via gait training, therapeutic exercises, therapeutic activities, neuromuscular re-education and progress toward the following goals:    Plan of Care Expires:  08/26/23    This plan of care has been discussed with the patient/caregiver, who was included in its development and is in agreement with the identified goals and treatment plan.     Subjective     Communicated with RN prior to session.  Patient agreeable to participate.     Pain/Comfort:  Pain Rating 1:  (no indications of pain)  Pain Rating Post-Intervention 1:  (no indications of pain)    Chief Complaint: Neurodegenerative disorder " "  Patient/Family Comments/goals: Pt nonverbal. Pt's family appreciative of PT session      Objective:     Patient found HOB elevated with telemetry  upon PT entry to room.    General Precautions: Standard, aspiration, fall   Orthopedic Precautions:N/A   Braces: N/A         Exams:    Cognition:  Orientation: unable to assess, patient non-verbal  Pt follows no verbal commands   Insight to deficits/safety awareness: impaired    Functional Mobility:    Bed Mobility:  Supine to Sit: Stand-by Assistance on L side of bed  Sit to Supine: Stand-by Assistance    Transfers:   Sit to Stand Transfer: Contact Guard Assistance  from EOB with no AD              Gait:  Patient received gait training in hallway ~450 feet with Contact Guard Assistance and Minimal Assistance and  no AD  Gait Assessment: occasional unsteady gait, narrow base of support, decreased felecia, and decreased arm swing  Gait Pattern Observed: Step-through reciprocal gait  Comments: All lines remained intact throughout ambulation trial, gait belt utilized. Pt has increased LOB with turns and is distracted by hallway obstacles     Stair Navigation:   Pt ascended/descended 2 stairs with no handrails and Moderate Assistance.   Pt with posterior lean and L  LE "push-off" on step leading to increased posterior LOB    Balance:  Static Sit:   Stand-By Assist at EOB   Normal: Patient able to maintain steady balance without handhold support.  Static Stand:   Contact-Guard Assist with no AD  Dynamic Stand:  CGA-min(a)  with no AD    Therapeutic Activities/Exercises     Patient assisted with functional mobility as noted above  Patient's family instructed to ambulate with pt in hallway at least 3x daily. Instructed them not to attempt stairs at this time.  Patient educated on PT POC and role of PT in acute care    AM-PAC 6 CLICK MOBILITY  Turning over in bed (including adjusting bedclothes, sheets and blankets)?: 3  Sitting down on and standing up from a chair with arms " (e.g., wheelchair, bedside commode, etc.): 3  Moving from lying on back to sitting on the side of the bed?: 3  Moving to and from a bed to a chair (including a wheelchair)?: 3  Need to walk in hospital room?: 3  Climbing 3-5 steps with a railing?: 3  Basic Mobility Total Score: 18     Patient left HOB elevated with all lines intact, call button in reach, RN notified, and family present.        History/Goals:     PAST MEDICAL HISTORY:  Past Medical History:   Diagnosis Date    Catatonia     Juvenile rheumatoid arthritis        Past Surgical History:   Procedure Laterality Date    LUMBAR PUNCTURE N/A 2023    Procedure: Lumbar Puncture;  Surgeon: Isabel Surgeon;  Location: [facility];  Service: Anesthesiology;  Laterality: N/A;       GOALS:   Multidisciplinary Problems       Physical Therapy Goals          Problem: Physical Therapy    Goal Priority Disciplines Outcome Goal Variances Interventions   Physical Therapy Goal     PT, PT/OT Ongoing, Progressing     Description: Goals to be met by: 2023     Patient will increase functional independence with mobility by performin. Supine to sit with Stand-by Assistance  2. Sit to stand from bedside chair with Supervision  3. Gait  x 200 feet with Supervision using No Assistive Device and no LOB  4. Ascend/descend 2 stair with no Handrails Stand-by Assistance using No Assistive Device.                          Time Tracking:     PT Received On: 23  PT Start Time: 829     PT Stop Time: 849  PT Total Time (min): 20 min     Billable Minutes: Gait Training 20      Justina Ivory, PT  2023  Pager# [phone]

## 2023-08-01 NOTE — NURSING
Pt family requesting to speak with a member of the Neuro team before refusing repeat LP  today.   Primary RN called [phone] for [facility] Neuro and explained situation. MD with [facility] will come speak with patient and family as soon as he is able.   Primary RN explained to MD that family is wanting to feed the patient because she is still breastfeeding her infant and needs to maintain her strength. Gen Genevieve MD verbalized wanting to keep patient NPO for now so that they can do the LP if family consent.  Primary RN went in and explained phone conversation with Gen Vallejo to patient and family and importance of staying NPO until decision about repeat LP was made. Pt family verbalized understanding and are willing to wait until Gen Vallejo MD comes down and speaks with them at this time.

## 2023-08-01 NOTE — PLAN OF CARE
MD states that patient's family had insurance questions. Mother at bedside. She asked about financial assistance and why the second LP estimated cost is much higher than the first one. CM gave mother the number to financial assistance. Mother states that the insurance states that labs need to be [facility] in [place]. CM explained it depends on who the hospital has a contract with. Evelin Angulo RN

## 2023-08-01 NOTE — SUBJECTIVE & OBJECTIVE
Interval History: Needs repeat lumbar puncture. Tired of being NPO because she needs to breastfeed. Family was concerned about cost of the procedure.    Review of Systems   Constitutional:  Positive for activity change and fatigue.   Neurological:  Positive for speech difficulty. Negative for seizures.     Objective:     Vital Signs (Most Recent):  Temp: 98.7 °F (37.1 °C) (08/01/23 1242)  Pulse: 78 (08/01/23 1242)  Resp: 18 (08/01/23 1242)  BP: 104/74 (08/01/23 1242)  SpO2: 100 % (08/01/23 1242) Vital Signs (24h Range):  Temp:  [97 °F (36.1 °C)-98.7 °F (37.1 °C)] 98.7 °F (37.1 °C)  Pulse:  [65-98] 78  Resp:  [16-18] 18  SpO2:  [96 %-100 %] 100 %  BP: ()/(64-74) 104/74     Weight: 54.4 kg (119 lb 15.9 oz)  Body mass index is 20.6 kg/m².    Intake/Output Summary (Last 24 hours) at 8/1/2023 1352  Last data filed at 7/31/2023 1400  Gross per 24 hour   Intake 300 ml   Output --   Net 300 ml           Physical Exam  Constitutional:       General: She is not in acute distress.     Appearance: She is well-developed and normal weight. She is not diaphoretic.   Pulmonary:      Effort: Pulmonary effort is normal. No respiratory distress.   Skin:     General: Skin is warm and dry.      Coloration: Skin is not jaundiced or pale.   Neurological:      Mental Status: She is alert.      Cranial Nerves: No facial asymmetry.      Motor: No tremor or seizure activity.   Psychiatric:         Attention and Perception: Attention normal.         Speech: She is noncommunicative.         Behavior: Behavior is not aggressive. Behavior is cooperative.             Significant Labs: All pertinent labs within the past 24 hours have been reviewed.    Significant Imaging: I have reviewed all pertinent imaging results/findings within the past 24 hours.

## 2023-08-01 NOTE — CARE UPDATE
Anesthesiology/Care Update    Ms. Carroll is a 39 yo female presenting with behavioral and motor changes with extensive neurological workup in progress including potential prion disease/CJD. Anesthesiology was asked to evaluate this patient for a bedside lumbar puncture. Patient has had a LP under sedation performed last Friday in fluoroscopy.    The patient is encephalopathic and does not follow commands well. Additionally, neurology is requesting 32cc of CSF collected with opening pressures and is being worked up for CJD. This would be a very difficult procedure to be done at bedside and would probably be best done again with sedation.       Alfred Chamorro, DO  PGY-3/CA-2  [phone]

## 2023-08-01 NOTE — PT/OT/SLP PROGRESS
Speech Language Pathology      Thania Carroll  MRN: [id number]    Pt attempted to be seen for completion of MBSS initially scheduled for 0830. Radiology contacted SLP to note that instrumental assessment had been cancelled via [facility] portal. Will continue to follow pt for ongoing speech/language and dysphagia with completion of instrumental assessment in the future as appropriate/desired per pt/family goals. Recommend continuation of most recent recommendations as follows:          General Recommendations:  Dysphagia therapy, Speech/language therapy, and Modified barium swallow study when feasible  Diet recommendations:  Dental Soft, Thin   Aspiration precautions    only when vital signs stable, 1 bite/sip at a time, Assistance with meals, Avoid talking while eating, Eliminate distractions, Feed only when awake/alert/attentive, Frequent oral care, HOB to 90 degrees,  Monitor for s/s of aspiration, Small bites/sips, and Strict aspiration precautions Continue to monitor for signs and symptoms of aspiration and discontinue oral feeding should you notice any of the following: watery eyes, reddened facial area, wet vocal quality, increased work of breathing, change in respiratory status, increased congestion, coughing, fever and/or change in level of alertness.  General Precautions: Standard, aspiration, fall  Communication strategies:  provide increased time to answer, go to room if call light pushed, and use yes/no questions to clarify       8/1/2023

## 2023-08-01 NOTE — PROGRESS NOTES
[facility]  Progress Note    Patient Name: Thania Carroll  MRN: [id number]  Patient Class: IP- Inpatient   Admission Date: 2023  Length of Stay: 7 days  Attending Physician: Yohan Alicea MD  Primary Care Provider: Primary Doctor No        Subjective:     Principal Problem:Neurodegenerative disorder        HPI:  Thania Carroll is a 38 year old white woman,  2 para 2, with juvenile rheumatoid arthritis, catatonia. She lives in [place], [place]. She is . She has two children.   She presented to [facility] on 2023. She had been having progressive neurological deterioration over the past 8 months with weakness, speech changes, slowing of mentation, joint contractures of hands. She can ambulate with assistance and was cognizant of where she wanted to go in the house as her family assisted her. She was able to communicate only through yes-no questions. Her symptoms seemed to have worsened with her last pregnancy in 2022, though symptoms started prior to it. She first noticed she had trouble using her hands when holding her baby in May 2022 and told her mother about it in  or July. Her family noticed that she started slowing down in early . She took longer to wash her hair, had trouble orienting things, had trouble holding onto things and losing her , then noticed she was moving more slowly in general. She underwent two neurologic workups, one at [facility] ([facility]) and the other in [place] with neurologist Dr. Franco. She was diagnosed with catatonia at [facility]. She was referred to Psychiatry. Dr. Franco performed lumbar puncture. She was hospitalized in 2023 for further workup. Dr. Franco suspected an autoimmune disorder. She underwent an extended electroencephalogram at home that was normal. Her hands started to contract. She started having echolalia  (meaningles repetition) in September/October as well as palilalia (involuntary repeating words). She had staring episodes that lasted a minute or two. She noted more muscle atrophy in November 2022. She delivered in May 2023 and was still breastfeeding an eight week old baby. She had no speech for the last 3 months. Had had several falls over the past week. Her last fall was 3 days ago. She started having emotional lability in October/November 2022.  She was seen at [facility]rology clinic on 7/24/2023. The only medication she had ever been on was prednisone in January with no improvement, methylprednisolone with no response, and methotrexate. She was seen by Nohemy Brady PA-C and Dr. Harshal Lyons, who recommended lumbar puncture with encephalopathy panel to send to [facility] and movement panel to rule out NMDAr encephalitis and 14-3-3, total tau RT-QulC, neurofilament light chain, inpatient IVIG and methylprednisolone, electroencephalogram to evaluate for delta brush, electromyelogram. Her father noticed intermittent choking spells when eating.   Clinic and emergency department evaluations:  Cbc. Cmp. Cpk, lipid panel- nl  Heavy metal screen 6/2023 - nl  TFTs -nl  Movement Disorder, Autoimmune pannel, cerruloplasm, negative  NA 1:160  UPT negative  Trep Ab bucky  U tox neg  Goodwater's D - neg  Lyme - neg  Hereditary Parkinson's - neg  CT head 1/2023  MRI head - done 6/15.23, some atrophy out of proportion for age, particularly in bilateral caudate and putamen noted by  Nohemy Brady PA-C.    She was admitted to Hospital Medicine Team N.      Overview/Hospital Course:  CT chest abdomen pelvis was done to assess for mass, particularly ovarian teratoma, which may be associated with autoimmune encephalitis (especially NMDA receptor encephalitis). It showed bilateral ground glass lung attenuation but did not show an ovarian tumor, so transvaginal ultrasound was done and also  showed no ovarian mass. She was put on continuous EEG to assess for abnormalities seen in NMDA receptor encephalitis (ie delta brush) vs abnormalities seen in other conditions such as Creutzfeld-Tripp Disease. Infection Control was consulted due to testing for prion disease. Fluoroscopy was consulted for lumbar puncture with opening pressure and CSF studies ordered: cell count and differential, protein, glucose, cytology and flow cytometry, gram stain and culture, autoimmune encephalopathy panel ([facility] [place] sendout panel), RT-quIC ([facility] prion center sendout lab). Neurology planned plasma exchange (PLEX) following lumbar puncture, and electromyography, likely outpatient. Lumbar puncture was done on 7/28/2023. CSF was used for labs ordered by the physician assistant she had seen in Neurology clinic rather than the labs ordered by the inpatient consult service, which made another lumbar puncture necessary before treatment.       Interval History: Needs repeat lumbar puncture. Tired of being NPO because she needs to breastfeed. Family was concerned about cost of the procedure.    Review of Systems   Constitutional:  Positive for activity change and fatigue.   Neurological:  Positive for speech difficulty. Negative for seizures.     Objective:     Vital Signs (Most Recent):  Temp: 98.7 °F (37.1 °C) (08/01/23 1242)  Pulse: 78 (08/01/23 1242)  Resp: 18 (08/01/23 1242)  BP: 104/74 (08/01/23 1242)  SpO2: 100 % (08/01/23 1242) Vital Signs (24h Range):  Temp:  [97 °F (36.1 °C)-98.7 °F (37.1 °C)] 98.7 °F (37.1 °C)  Pulse:  [65-98] 78  Resp:  [16-18] 18  SpO2:  [96 %-100 %] 100 %  BP: ()/(64-74) 104/74     Weight: 54.4 kg (119 lb 15.9 oz)  Body mass index is 20.6 kg/m².    Intake/Output Summary (Last 24 hours) at 8/1/2023 1352  Last data filed at 7/31/2023 1400  Gross per 24 hour   Intake 300 ml   Output --   Net 300 ml           Physical Exam  Constitutional:       General: She is not in acute distress.     Appearance:  She is well-developed and normal weight. She is not diaphoretic.   Pulmonary:      Effort: Pulmonary effort is normal. No respiratory distress.   Skin:     General: Skin is warm and dry.      Coloration: Skin is not jaundiced or pale.   Neurological:      Mental Status: She is alert.      Cranial Nerves: No facial asymmetry.      Motor: No tremor or seizure activity.   Psychiatric:         Attention and Perception: Attention normal.         Speech: She is noncommunicative.         Behavior: Behavior is not aggressive. Behavior is cooperative.             Significant Labs: All pertinent labs within the past 24 hours have been reviewed.    Significant Imaging: I have reviewed all pertinent imaging results/findings within the past 24 hours.      Assessment/Plan:      * Neurodegenerative disorder  Neurology following. CT and ultrasound showed no ovarian tumor. LP done but will need another to obtain more labs. MPU request ordered.    Ground glass opacity present on imaging of lung  Family concerned. Will monitor for evidence of pneumonia. No fever or leukocytosis. No hypoxia. Her cough is weak but she has not been coughing. Can get a follow-up chest X-ray.     Oropharyngeal dysphagia  SLP. Dental soft diet.    Breastfeeding (infant)  Patient opts to continue breastfeeding. Can call the [facility] [phone] if positive for prion disease.    Juvenile rheumatoid arthritis  Chronic, stable.  GISEL :160, autoimmune panel is negative  Rheu Factor Neg      VTE Risk Mitigation (From admission, onward)         Ordered     enoxaparin injection 40 mg  Daily         07/25/23 1802     IP VTE HIGH RISK PATIENT  Once         07/25/23 1802     Place sequential compression device  Until discontinued         07/25/23 1802                Discharge Planning   ALANNA: 8/4/2023     Code Status: Full Code   Is the patient medically ready for discharge?: No    Reason for patient still in hospital (select all that apply): Laboratory  test, Treatment and Consult recommendations  Discharge Plan A: Home with family, Home Health   Discharge Delays: None known at this time              Yohan Alicea MD  Department of Hospital Medicine   [facility] - Neurosurgery ([facility])

## 2023-08-01 NOTE — SUBJECTIVE & OBJECTIVE
Review of Systems   Unable to perform ROS: Mental status change     Objective:     Vital Signs (Most Recent):  Temp: 98.3 °F (36.8 °C) (08/03/23 0821)  Pulse: 73 (08/03/23 0821)  Resp: 20 (08/03/23 0821)  BP: 104/72 (08/03/23 0821)  SpO2: 98 % (08/03/23 0821) Vital Signs (24h Range):  Temp:  [97.2 °F (36.2 °C)-98.9 °F (37.2 °C)] 98.3 °F (36.8 °C)  Pulse:  [51-76] 73  Resp:  [16-20] 20  SpO2:  [97 %-100 %] 98 %  BP: ()/(61-78) 104/72     Weight: 54.4 kg (119 lb 15.9 oz)  Body mass index is 20.6 kg/m².     Physical Exam   Neurological Exam:  MENTAL STATUS  Level of consciousness: alert  Orientation: unable to answer orientation questions  Moves all four extremities spontaneously.  Blinks to threat.  Occasionally verbalizes.  Able to follow commands, including blinking, getting up to walk.  No startle myoclonus.    CRANIAL NERVES  CN III, IV, VI: PERRL, EOMI  CN V: facial sensation intact, muscles of mastication intact  CN VII: hypomimia  CN IX, X: speech, when present, is soft and monotone    MOTOR EXAM  Muscle bulk: generally diminished, though this is most evident in the bilateral upper extremities, with profound palmar atrophy  Muscle tone: rigidity, most prominent in the bilateral upper extremities (distal >proximal); no spasticity    Strength - Upper Extremities   Arm abduction Elbow flexion Elbow extension Wrist flexion Wrist extension Finger abduction   Right 4/5 4/5 4/5 4/5 4/5 3/5   Left 4/5 4/5 4/5 4/5 4/5 3/5     Strength - Lower Extremities   Hip flexion Knee flexion Knee extension Dorsiflexion Plantarflexion   Right 5/5 5/5 5/5 5/5 5/5   Left 5/5 5/5 5/5 5/5 5/5     REFLEXES   Biceps Triceps Brachioradialis Patellar Achilles   Right +2 +2 +2 +2 +2   Left +2 +2 +2 +2 +2     Planter reflex: down-going bilaterally    SENSORY EXAM  Withdraws to pain in all four extremities. Assessment of specific modalities (vibration, pin-prick, temperature, proprioception) is limited due to mental  status.    COORDINATION  Tremor: none  Gait: slow gait with reduced arm swing and slow turning.       Significant Labs: All pertinent lab results from the past 24 hours have been reviewed.    Significant Imaging: I have reviewed all pertinent imaging results/findings within the past 24 hours.

## 2023-08-02 ENCOUNTER — ANESTHESIA (OUTPATIENT)
Dept: ENDOSCOPY | Facility: HOSPITAL | Age: 38
DRG: 057 | End: 2023-08-02
Payer: COMMERCIAL

## 2023-08-02 PROCEDURE — 99000 SPECIMEN HANDLING OFFICE-LAB: CPT

## 2023-08-02 PROCEDURE — 86403 PARTICLE AGGLUT ANTBDY SCRN: CPT

## 2023-08-02 PROCEDURE — 25000003 PHARM REV CODE 250: Performed by: NURSE ANESTHETIST, CERTIFIED REGISTERED

## 2023-08-02 PROCEDURE — 37000009 HC ANESTHESIA EA ADD 15 MINS

## 2023-08-02 PROCEDURE — 97530 THERAPEUTIC ACTIVITIES: CPT

## 2023-08-02 PROCEDURE — 87205 SMEAR GRAM STAIN: CPT

## 2023-08-02 PROCEDURE — 87798 DETECT AGENT NOS DNA AMP: CPT

## 2023-08-02 PROCEDURE — 99233 SBSQ HOSP IP/OBS HIGH 50: CPT | Mod: ,,, | Performed by: HOSPITALIST

## 2023-08-02 PROCEDURE — 11000001 HC ACUTE MED/SURG PRIVATE ROOM

## 2023-08-02 PROCEDURE — D9220A PRA ANESTHESIA: Mod: ANES,,, | Performed by: ANESTHESIOLOGY

## 2023-08-02 PROCEDURE — 87498 ENTEROVIRUS PROBE&REVRS TRNS: CPT

## 2023-08-02 PROCEDURE — D9220A PRA ANESTHESIA: ICD-10-PCS | Mod: ANES,,, | Performed by: ANESTHESIOLOGY

## 2023-08-02 PROCEDURE — 37000008 HC ANESTHESIA 1ST 15 MINUTES

## 2023-08-02 PROCEDURE — 25000003 PHARM REV CODE 250: Performed by: HOSPITALIST

## 2023-08-02 PROCEDURE — D9220A PRA ANESTHESIA: Mod: CRNA,,, | Performed by: NURSE ANESTHETIST, CERTIFIED REGISTERED

## 2023-08-02 PROCEDURE — 63600175 PHARM REV CODE 636 W HCPCS: Performed by: STUDENT IN AN ORGANIZED HEALTH CARE EDUCATION/TRAINING PROGRAM

## 2023-08-02 PROCEDURE — 63600175 PHARM REV CODE 636 W HCPCS: Performed by: HOSPITALIST

## 2023-08-02 PROCEDURE — 71000033 HC RECOVERY, INTIAL HOUR

## 2023-08-02 PROCEDURE — 82945 GLUCOSE OTHER FLUID: CPT

## 2023-08-02 PROCEDURE — 86255 FLUORESCENT ANTIBODY SCREEN: CPT | Mod: 59

## 2023-08-02 PROCEDURE — 82164 ANGIOTENSIN I ENZYME TEST: CPT

## 2023-08-02 PROCEDURE — 87070 CULTURE OTHR SPECIMN AEROBIC: CPT

## 2023-08-02 PROCEDURE — 87798 DETECT AGENT NOS DNA AMP: CPT | Mod: 59

## 2023-08-02 PROCEDURE — 87556 M.TUBERCULO DNA AMP PROBE: CPT

## 2023-08-02 PROCEDURE — 36415 COLL VENOUS BLD VENIPUNCTURE: CPT | Performed by: HOSPITALIST

## 2023-08-02 PROCEDURE — 99233 PR SUBSEQUENT HOSPITAL CARE,LEVL III: ICD-10-PCS | Mod: ,,, | Performed by: HOSPITALIST

## 2023-08-02 PROCEDURE — 94761 N-INVAS EAR/PLS OXIMETRY MLT: CPT

## 2023-08-02 PROCEDURE — 84157 ASSAY OF PROTEIN OTHER: CPT

## 2023-08-02 PROCEDURE — 63600175 PHARM REV CODE 636 W HCPCS: Performed by: NURSE ANESTHETIST, CERTIFIED REGISTERED

## 2023-08-02 PROCEDURE — D9220A PRA ANESTHESIA: ICD-10-PCS | Mod: CRNA,,, | Performed by: NURSE ANESTHETIST, CERTIFIED REGISTERED

## 2023-08-02 RX ORDER — LIDOCAINE HYDROCHLORIDE 20 MG/ML
INJECTION INTRAVENOUS
Status: DISCONTINUED | OUTPATIENT
Start: 2023-08-02 | End: 2023-08-02

## 2023-08-02 RX ORDER — SODIUM CHLORIDE 0.9 % (FLUSH) 0.9 %
10 SYRINGE (ML) INJECTION
Status: DISCONTINUED | OUTPATIENT
Start: 2023-08-02 | End: 2023-08-08 | Stop reason: HOSPADM

## 2023-08-02 RX ORDER — FENTANYL CITRATE 50 UG/ML
25 INJECTION, SOLUTION INTRAMUSCULAR; INTRAVENOUS EVERY 5 MIN PRN
Status: DISCONTINUED | OUTPATIENT
Start: 2023-08-02 | End: 2023-08-03

## 2023-08-02 RX ORDER — PROPOFOL 10 MG/ML
VIAL (ML) INTRAVENOUS
Status: DISCONTINUED | OUTPATIENT
Start: 2023-08-02 | End: 2023-08-02

## 2023-08-02 RX ORDER — LIDOCAINE HYDROCHLORIDE 10 MG/ML
3 INJECTION INFILTRATION; PERINEURAL ONCE
Status: COMPLETED | OUTPATIENT
Start: 2023-08-02 | End: 2023-08-02

## 2023-08-02 RX ADMIN — PROPOFOL 50 MG: 10 INJECTION, EMULSION INTRAVENOUS at 02:08

## 2023-08-02 RX ADMIN — SODIUM CHLORIDE, SODIUM GLUCONATE, SODIUM ACETATE, POTASSIUM CHLORIDE, MAGNESIUM CHLORIDE, SODIUM PHOSPHATE, DIBASIC, AND POTASSIUM PHOSPHATE: .53; .5; .37; .037; .03; .012; .00082 INJECTION, SOLUTION INTRAVENOUS at 02:08

## 2023-08-02 RX ADMIN — PROPOFOL 15 MCG/KG/MIN: 10 INJECTION, EMULSION INTRAVENOUS at 02:08

## 2023-08-02 RX ADMIN — SODIUM CHLORIDE, POTASSIUM CHLORIDE, SODIUM LACTATE AND CALCIUM CHLORIDE 500 ML: 600; 310; 30; 20 INJECTION, SOLUTION INTRAVENOUS at 11:08

## 2023-08-02 RX ADMIN — LIDOCAINE HYDROCHLORIDE 60 MG: 20 INJECTION INTRAVENOUS at 02:08

## 2023-08-02 RX ADMIN — LIDOCAINE HYDROCHLORIDE 3 ML: 10 INJECTION, SOLUTION INFILTRATION; PERINEURAL at 02:08

## 2023-08-02 RX ADMIN — Medication 6 MG: at 11:08

## 2023-08-02 RX ADMIN — GLYCERIN 1 SUPPOSITORY: 2 SUPPOSITORY RECTAL at 05:08

## 2023-08-02 NOTE — ASSESSMENT & PLAN NOTE
No fever or leukocytosis. No hypoxia. Her cough is weak but she has not been coughing. Probably aspirations. Can get a follow-up chest X-ray.

## 2023-08-02 NOTE — PLAN OF CARE
Plan of care note regarding Miss Carroll.     LP done today, appreciate Radiology's help.     Discussed case and plan with primary team and apheresis team. Pending basic CSF results, we will plan PLEX for 5 procedures over 5-7 days. Will defer to apheresis team.     Neurology will continue to follow. Please call if any questions or concerns.

## 2023-08-02 NOTE — SUBJECTIVE & OBJECTIVE
Interval History: Getting repeat LP today. Requested central line placement as well for planned PLEX afterward.    Review of Systems   Constitutional:  Positive for activity change and fatigue.   Neurological:  Positive for speech difficulty. Negative for seizures.     Objective:     Vital Signs (Most Recent):  Temp: 98 °F (36.7 °C) (08/02/23 1223)  Pulse: 63 (08/02/23 1223)  Resp: 18 (08/02/23 1223)  BP: 99/72 (08/02/23 1223)  SpO2: 99 % (08/02/23 1223) Vital Signs (24h Range):  Temp:  [96.5 °F (35.8 °C)-98.2 °F (36.8 °C)] 98 °F (36.7 °C)  Pulse:  [63-91] 63  Resp:  [16-19] 18  SpO2:  [97 %-99 %] 99 %  BP: ()/(63-72) 99/72     Weight: 54.4 kg (119 lb 15.9 oz)  Body mass index is 20.6 kg/m².  No intake or output data in the 24 hours ending 08/02/23 1411        Physical Exam  Constitutional:       General: She is not in acute distress.     Appearance: She is well-developed and normal weight. She is not diaphoretic.   Pulmonary:      Effort: Pulmonary effort is normal. No respiratory distress.   Skin:     General: Skin is warm and dry.      Coloration: Skin is not jaundiced or pale.   Neurological:      Mental Status: She is alert.      Cranial Nerves: No facial asymmetry.      Motor: No tremor or seizure activity.   Psychiatric:         Attention and Perception: Attention normal.         Speech: She is noncommunicative.         Behavior: Behavior is not aggressive. Behavior is cooperative.             Significant Labs: All pertinent labs within the past 24 hours have been reviewed.    Significant Imaging: I have reviewed all pertinent imaging results/findings within the past 24 hours.

## 2023-08-02 NOTE — NURSING
Off unit for LP today. Family at bedside, nursing 3 month old baby. Encouraged increased fluid per oral. Ambulating well with SB assist. Voiding spontaneously. Glycerin suppository for BM today.

## 2023-08-02 NOTE — ASSESSMENT & PLAN NOTE
Patient opts to continue breastfeeding. Can call the [facility] [phone] if positive for prion disease.

## 2023-08-02 NOTE — ASSESSMENT & PLAN NOTE
Neurology following. CT and ultrasound showed no ovarian tumor. LP done but will need another to obtain more labs. Getting repeat LP today. Plan for PLEX afterward.

## 2023-08-02 NOTE — CONSULTS
[facility] \[facility] [place] [facility]\"")  Transfusion Medicine  Consult Note    Patient Name: Thania Carroll  MRN: [id number]  Admission Date: 7/25/2023  Hospital Length of Stay: 8 days  Attending Physician: Yohan Alicea MD  Primary Care Provider: Primary Doctor No     Consults  Subjective:     Principal Problem:Neurodegenerative disorder    History of Present Illness:  Very difficult case of a 37 y/o female with a complex neurological disorder of unknown etiology, with autoimmune encephalitis as a possible choice.  Neurology would like to consider a series of Plasma Exchanges.  We concur and will hopefully start tomorrow after a central line is placed.  Plan is 5 procedures over 5-7 days.  Avoid any ACE Inhibitors during the next week.        Assessment/Plan:     Acute Disseminated Encephalomyelitis carries a Category II Grade 2C indication for therapeutic plasma exchange via the 2016 Journal of Clinical Apheresis Guidelines (Willson J et al. Journal of Clinical Apheresis 2016; 31:149-162.) The TPE plan is as follows: tentatively Th/Fri/Sat/Mon/Tues.        Kodak Haines Jr, MD  Transfusion Medicine      [facility])

## 2023-08-02 NOTE — TRANSFER OF CARE
"Anesthesia Transfer of Care Note    Patient: Thania Carroll    Procedure(s) Performed: Procedure(s) (LRB):  Lumbar Puncture (N/A)    Patient location: PACU    Anesthesia Type: MAC    Transport from OR: Transported from OR on 2-3 L/min O2 by NC with adequate spontaneous ventilation    Post pain: adequate analgesia    Post assessment: no apparent anesthetic complications and tolerated procedure well    Post vital signs: stable    Level of consciousness: awake    Nausea/Vomiting: no nausea/vomiting    Complications: none    Transfer of care protocol was followed      Last vitals:   Visit Vitals  /68 (BP Location: Left arm, Patient Position: Lying)   Pulse (!) 53   Temp 37.1 °C (98.8 °F) (Temporal)   Resp 18   Ht 5' 4" (1.626 m)   Wt 54.4 kg (119 lb 15.9 oz)   LMP 05/22/2023 (Approximate)   SpO2 100%   Breastfeeding Yes   BMI 20.60 kg/m²     "

## 2023-08-02 NOTE — PT/OT/SLP PROGRESS
Speech Language Pathology      Thania Carroll  MRN: [id number]    SLP attempted to see Patient for therapy. Patient reviewed with RN, RN confirmed Patient NPO for pending procedure. Upon initial attempt, Patient not available (Patient not in the room.) Upon second attempt, Patient working with therapy (OT.) Upon third attempt, Patient found awake and alert in the bed.  SLP unable to elicit response to yes/no question provided max cues. Patient's mother entered into the room with Patient's daughter as session progressed and explained it was time for the Patient's baby to be fed. SLP reviewed ongoing SLP POC and discontinued session per request for Patient to feed her daughter.  SLP unable to re-attempt later service day. No charges billed this service day. ST to continue to follow up per POC.    8/2/2023

## 2023-08-02 NOTE — PROCEDURES
Radiology Post-Procedure Note    Pre Op Diagnosis: encephalopathy  Post Op Diagnosis: Same    Procedure: imaging guided lumbar puncture    Procedure performed by: Karen Rich MD PhD    Written Informed Consent Obtained: yes  Specimen Removed: yes  Estimated Blood Loss: Minimal    Findings:   Successful lumbar puncture. Opening pressure 15 cm H2O, closing pressure 9 cm H2O. 22 cc clear CSF was collected for lab tests.  Patient tolerated procedure well.      Karen Rich MD PhD  Radiology Resident

## 2023-08-02 NOTE — PLAN OF CARE
Problem: Occupational Therapy  Goal: Occupational Therapy Goal  Description: Goals to be met by: 8/9/23     Patient will increase functional independence with ADLs by performing:    Feeding with Moderate Assistance.  UE Dressing with Moderate Assistance.  LE Dressing with Maximum Assistance.  Grooming while standing at sink with Moderate Assistance.  Toileting from toilet with Moderate Assistance for hygiene and clothing management.   Toilet transfer to toilet with Contact Guard Assistance.  Upper extremity exercise program (PROM of BUE) x20 reps, with independence.    Outcome: Ongoing, Progressing     Goals remain appropriate

## 2023-08-02 NOTE — PT/OT/SLP PROGRESS
Occupational Therapy   Treatment    Name: Thania Carroll  MRN: [id number]  Admitting Diagnosis:  Neurodegenerative disorder  Day of Surgery    Recommendations:     Discharge Recommendations: other (see comments)  Discharge Equipment Recommendations:  shower chair  Barriers to discharge:  Other (Comment)    Assessment:     Thania Carroll is a 38 y.o. female with a medical diagnosis of Neurodegenerative disorder.  She presents with decreased functional status post medical condition. Performance deficits affecting function are weakness, impaired endurance, impaired sensation, impaired self care skills, impaired functional mobility, gait instability, decreased upper extremity function, impaired balance, decreased lower extremity function, decreased coordination, decreased safety awareness, impaired coordination, impaired fine motor, decreased ROM.     Rehab Prognosis:  Fair; patient would benefit from acute skilled OT services to address these deficits and reach maximum level of function.       Plan:     Patient to be seen 4 x/week to address the above listed problems via self-care/home management, therapeutic activities, neuromuscular re-education, therapeutic exercises  Plan of Care Expires: 09/28/23  Plan of Care Reviewed with: patient, family    Subjective     Chief Complaint: Patient unable to verbalize at this time   Patient/Family Comments/goals: gain functional status   Pain/Comfort:  Pain Rating 1: 0/10    Objective:     Communicated with: RN prior to session.  Patient found supine with telemetry upon OT entry to room.    General Precautions: Standard, aspiration, fall    Orthopedic Precautions:N/A  Braces: N/A  Respiratory Status: Room air     Occupational Performance:     Bed Mobility:    Patient completed Rolling/Turning to Right with stand by assistance  Patient completed Scooting/Bridging with stand by assistance  Patient completed Supine to Sit with minimum assistance  Patient completed Sit to Supine with  minimum assistance     Activities of Daily Living:  Grooming: maximal assistance Huslia assistance to complete task due to decreased ROM       AMPAC 6 Click ADL: 12    Treatment & Education:  While supine OT completed BUE ROM in all planes for increased functional capacity of the upper extremities. Patient transferred EOB with min assist and is able to sit upright with standby A. OT and patient completed anterior reach activity. OT provided patient with visual cue and offered tactile and auditory cueing to encourage patient reach. Patient needed moderate tactile cueing to complete AAROM activity but volitional movement initiated by patient appeared to be improved during activity in the pattern of shoulder flexion of BUE. Patient should continue UE ROM activities for improved engagement in ADLs.    Patient left supine with all lines intact, call button in reach, and RN notified    GOALS:   Multidisciplinary Problems       Occupational Therapy Goals          Problem: Occupational Therapy    Goal Priority Disciplines Outcome Interventions   Occupational Therapy Goal     OT, PT/OT Ongoing, Progressing    Description: Goals to be met by: 8/9/23     Patient will increase functional independence with ADLs by performing:    Feeding with Moderate Assistance.  UE Dressing with Moderate Assistance.  LE Dressing with Maximum Assistance.  Grooming while standing at sink with Moderate Assistance.  Toileting from toilet with Moderate Assistance for hygiene and clothing management.   Toilet transfer to toilet with Contact Guard Assistance.  Upper extremity exercise program (PROM of BUE) x20 reps, with independence.                         Time Tracking:     OT Date of Treatment: 08/02/23  OT Start Time: 1005  OT Stop Time: 1029  OT Total Time (min): 24 min    Billable Minutes:Therapeutic Activity 24 minutes    OT/MIA: OT          8/2/2023

## 2023-08-02 NOTE — H&P
Radiology Pre-procedure Note    History of Present Illness:  Thania Carroll is a 38 y.o. female who presents for lumbar puncture.    Admission H&P reviewed.  Past Medical History:   Diagnosis Date    Catatonia     Juvenile rheumatoid arthritis      Past Surgical History:   Procedure Laterality Date    LUMBAR PUNCTURE N/A 7/28/2023    Procedure: Lumbar Puncture;  Surgeon: Isabel Surgeon;  Location: [facility];  Service: Anesthesiology;  Laterality: N/A;       Review of Systems:   As documented in primary team H&P    Home Meds:   Prior to Admission medications    Medication Sig Start Date End Date Taking? Authorizing Provider   methylPREDNISolone sodium succinate (SOLU-MEDROL) 1,000 mg injection MIX 1g (1 SYRINGE) into a smoothie to take once daily for 5 doses 6/20/23   Nohemy Brady PA-C     Scheduled Meds:    enoxparin  40 mg Subcutaneous Daily    glycerin adult  1 suppository Rectal Once    polyethylene glycol  17 g Oral Daily    senna-docusate 8.6-50 mg  1 tablet Oral BID     Continuous Infusions:   PRN Meds:acetaminophen, dextrose 10%, dextrose 10%, glucagon (human recombinant), glucose, glucose, iohexol, melatonin, naloxone, sodium chloride 0.9%  Anticoagulants/Antiplatelets: Lovenox    Allergies: Review of patient's allergies indicates:  No Known Allergies  Sedation Hx: have not been any systemic reactions    Labs:  Recent Labs   Lab 07/27/23  1801   INR 1.0       Recent Labs   Lab 07/28/23  0517   WBC 5.85   HGB 13.8   HCT 41.8   MCV 93         Recent Labs   Lab 07/28/23  0517   GLU 86      K 3.8      CO2 22*   BUN 17   CREATININE 0.8   CALCIUM 9.7   ALT 41   AST 24   ALBUMIN 3.8   BILITOT 0.6         Vitals:  Temp: 98 °F (36.7 °C) (08/02/23 1223)  Pulse: 63 (08/02/23 1223)  Resp: 18 (08/02/23 1223)  BP: 99/72 (08/02/23 1223)  SpO2: 99 % (08/02/23 1223)     Physical Exam:  ASA: 3  Mallampati: 3  General: no acute distress  Mental Status: altered  Chest: unlabored breathing    Plan: imaging  guided lumbar puncture  Sedation Plan: per anesthesia    Karen Rich MD PhD  Radiology Resident

## 2023-08-02 NOTE — PROGRESS NOTES
[facility] Neurosurgery [facility] Medicine  Progress Note    Patient Name: Thania Carroll  MRN: [id number]  Patient Class: IP- Inpatient   Admission Date: 2023  Length of Stay: 8 days  Attending Physician: Yohan Alicea MD  Primary Care Provider: Primary Doctor No        Subjective:     Principal Problem:Neurodegenerative disorder        HPI:  Thania Carroll is a 38 year old white woman,  2 para 2, with juvenile rheumatoid arthritis, catatonia. She lives in [place], [place]. She is . She has two children.   She presented to [facility] on 2023. She had been having progressive neurological deterioration over the past 8 months with weakness, speech changes, slowing of mentation, joint contractures of hands. She can ambulate with assistance and was cognizant of where she wanted to go in the house as her family assisted her. She was able to communicate only through yes-no questions. Her symptoms seemed to have worsened with her last pregnancy in 2022, though symptoms started prior to it. She first noticed she had trouble using her hands when holding her baby in May 2022 and told her mother about it in  or July. Her family noticed that she started slowing down in early . She took longer to wash her hair, had trouble orienting things, had trouble holding onto things and losing her , then noticed she was moving more slowly in general. She underwent two neurologic workups, one at [facility] ([facility]) and the other in [place] with neurologist Dr. Franco. She was diagnosed with catatonia at [facility]. She was referred to Psychiatry. Dr. Franco performed lumbar puncture. She was hospitalized in 2023 for further workup. Dr. Franco suspected an autoimmune disorder. She underwent an extended electroencephalogram at home that was normal. Her hands started to contract. She started having echolalia  (meaningles repetition) in September/October as well as palilalia (involuntary repeating words). She had staring episodes that lasted a minute or two. She noted more muscle atrophy in November 2022. She delivered in May 2023 and was still breastfeeding an eight week old baby. She had no speech for the last 3 months. Had had several falls over the past week. Her last fall was 3 days ago. She started having emotional lability in October/November 2022.  She was seen at [facility] Neurology clinic on 7/24/2023. The only medication she had ever been on was prednisone in January with no improvement, methylprednisolone with no response, and methotrexate. She was seen by Nohemy Brady PA-C and Dr. Harshal Lyons, who recommended lumbar puncture with encephalopathy panel to send to [facility] and movement panel to rule out NMDAr encephalitis and 14-3-3, total tau RT-QulC, neurofilament light chain, inpatient IVIG and methylprednisolone, electroencephalogram to evaluate for delta brush, electromyelogram. Her father noticed intermittent choking spells when eating.   Clinic and emergency department evaluations:  Cbc. Cmp. Cpk, lipid panel- nl  Heavy metal screen 6/2023 - nl  TFTs -nl  Movement Disorder, Autoimmune pannel, cerruloplasm, negative  NA 1:160  UPT negative  Trep Ab bucky  U tox neg  Jasper's D - neg  Lyme - neg  Hereditary Parkinson's - neg  CT head 1/2023  MRI head - done 6/15.23, some atrophy out of proportion for age, particularly in bilateral caudate and putamen noted by  Nohemy Brady PA-C.    She was admitted to Hospital Medicine Team N.      Overview/Hospital Course:  CT chest abdomen pelvis was done to assess for mass, particularly ovarian teratoma, which may be associated with autoimmune encephalitis (especially NMDA receptor encephalitis). It showed bilateral ground glass lung attenuation but did not show an ovarian tumor, so transvaginal ultrasound was done and also  showed no ovarian mass. She was put on continuous EEG to assess for abnormalities seen in NMDA receptor encephalitis (ie delta brush) vs abnormalities seen in other conditions such as Creutzfeld-Tripp Disease. Infection Control was consulted due to testing for prion disease. Fluoroscopy was consulted for lumbar puncture with opening pressure and CSF studies ordered: cell count and differential, protein, glucose, cytology and flow cytometry, gram stain and culture, autoimmune encephalopathy panel ([facility] [place] sendout panel), RT-quIC ([facility] prion [place] sendout lab). Neurology planned plasma exchange (PLEX) following lumbar puncture, and electromyography, likely outpatient. Lumbar puncture was done on 7/28/2023. CSF was used for labs ordered by the physician assistant she had seen in Neurology clinic rather than the labs ordered by the inpatient consult service, which made another lumbar puncture necessary before treatment.       Interval History: Getting repeat LP today. Requested central line placement as well for planned PLEX afterward.    Review of Systems   Constitutional:  Positive for activity change and fatigue.   Neurological:  Positive for speech difficulty. Negative for seizures.     Objective:     Vital Signs (Most Recent):  Temp: 98 °F (36.7 °C) (08/02/23 1223)  Pulse: 63 (08/02/23 1223)  Resp: 18 (08/02/23 1223)  BP: 99/72 (08/02/23 1223)  SpO2: 99 % (08/02/23 1223) Vital Signs (24h Range):  Temp:  [96.5 °F (35.8 °C)-98.2 °F (36.8 °C)] 98 °F (36.7 °C)  Pulse:  [63-91] 63  Resp:  [16-19] 18  SpO2:  [97 %-99 %] 99 %  BP: ()/(63-72) 99/72     Weight: 54.4 kg (119 lb 15.9 oz)  Body mass index is 20.6 kg/m².  No intake or output data in the 24 hours ending 08/02/23 1411        Physical Exam  Constitutional:       General: She is not in acute distress.     Appearance: She is well-developed and normal weight. She is not diaphoretic.   Pulmonary:      Effort: Pulmonary effort is normal. No respiratory  distress.   Skin:     General: Skin is warm and dry.      Coloration: Skin is not jaundiced or pale.   Neurological:      Mental Status: She is alert.      Cranial Nerves: No facial asymmetry.      Motor: No tremor or seizure activity.   Psychiatric:         Attention and Perception: Attention normal.         Speech: She is noncommunicative.         Behavior: Behavior is not aggressive. Behavior is cooperative.             Significant Labs: All pertinent labs within the past 24 hours have been reviewed.    Significant Imaging: I have reviewed all pertinent imaging results/findings within the past 24 hours.      Assessment/Plan:      * Neurodegenerative disorder  Neurology following. CT and ultrasound showed no ovarian tumor. LP done but will need another to obtain more labs. Getting repeat LP today. Plan for PLEX afterward.    Ground glass opacity present on imaging of lung  No fever or leukocytosis. No hypoxia. Her cough is weak but she has not been coughing. Probably aspirations. Can get a follow-up chest X-ray.     Oropharyngeal dysphagia  SLP. Dental soft diet. Family wanted to defer modified barium swallow study.    Breastfeeding (infant)  Patient opts to continue breastfeeding. Can call the [facility] [phone] if positive for prion disease.    Juvenile rheumatoid arthritis  Chronic, stable.  GISEL :160, autoimmune panel is negative  Rheu Factor Neg        VTE Risk Mitigation (From admission, onward)         Ordered     enoxaparin injection 40 mg  Daily         07/25/23 1802     IP VTE HIGH RISK PATIENT  Once         07/25/23 1802     Place sequential compression device  Until discontinued         07/25/23 1802                Discharge Planning   ALANNA: 8/4/2023     Code Status: Full Code   Is the patient medically ready for discharge?: No    Reason for patient still in hospital (select all that apply): Patient trending condition, Laboratory test, Treatment and Consult recommendations  Discharge Plan  A: Home with family, Home Health   Discharge Delays: None known at this time              Yohan Alicea MD  Department of Hospital Medicine   [facility] ([facility])

## 2023-08-02 NOTE — PLAN OF CARE
Problem: Adult Inpatient Plan of Care  Goal: Plan of Care Review  Outcome: Ongoing, Progressing  Goal: Patient-Specific Goal (Individualized)  Outcome: Ongoing, Progressing  Goal: Absence of Hospital-Acquired Illness or Injury  Outcome: Ongoing, Progressing  Goal: Optimal Comfort and Wellbeing  Outcome: Ongoing, Progressing  Goal: Readiness for Transition of Care  Outcome: Ongoing, Progressing     Problem: Fall Injury Risk  Goal: Absence of Fall and Fall-Related Injury  Outcome: Ongoing, Progressing     Problem: Seizure, Active Management  Goal: Absence of Seizure/Seizure-Related Injury  Outcome: Ongoing, Progressing     Problem: Infection  Goal: Absence of Infection Signs and Symptoms  Outcome: Ongoing, Progressing     Problem: Skin Injury Risk Increased  Goal: Skin Health and Integrity  Outcome: Ongoing, Progressing    NPO since midnight, clear liquids for breakfast, no acute events, will continue to monitor.

## 2023-08-02 NOTE — PLAN OF CARE
Vital signs stable. Afebrile. Alert and moving all extremities. JONATAN orientation due to being nonverbal. LP site remains CDI. POC reviewed.

## 2023-08-03 ENCOUNTER — ANESTHESIA (OUTPATIENT)
Dept: NEUROSURGERY | Facility: HOSPITAL | Age: 38
DRG: 057 | End: 2023-08-03
Payer: COMMERCIAL

## 2023-08-03 ENCOUNTER — ANESTHESIA EVENT (OUTPATIENT)
Dept: NEUROSURGERY | Facility: HOSPITAL | Age: 38
DRG: 057 | End: 2023-08-03
Payer: COMMERCIAL

## 2023-08-03 LAB
CSF TUBE NUMBER: 2
GLUCOSE CSF-MCNC: 53 MG/DL (ref 40–70)
GLUCOSE CSF-MCNC: 53 MG/DL (ref 40–70)
PROT CSF-MCNC: 34 MG/DL (ref 15–40)
PROT CSF-MCNC: 38 MG/DL (ref 15–40)

## 2023-08-03 PROCEDURE — 99232 SBSQ HOSP IP/OBS MODERATE 35: CPT | Mod: ,,, | Performed by: STUDENT IN AN ORGANIZED HEALTH CARE EDUCATION/TRAINING PROGRAM

## 2023-08-03 PROCEDURE — 36514 APHERESIS PLASMA: CPT

## 2023-08-03 PROCEDURE — 36514 PR THER APHERESIS,PLASMA PHERESIS: ICD-10-PCS | Mod: ,,, | Performed by: PATHOLOGY

## 2023-08-03 PROCEDURE — 97535 SELF CARE MNGMENT TRAINING: CPT

## 2023-08-03 PROCEDURE — 63600175 PHARM REV CODE 636 W HCPCS: Performed by: PATHOLOGY

## 2023-08-03 PROCEDURE — 36514 APHERESIS PLASMA: CPT | Mod: ,,, | Performed by: PATHOLOGY

## 2023-08-03 PROCEDURE — 76937 US GUIDE VASCULAR ACCESS: CPT

## 2023-08-03 PROCEDURE — P9045 ALBUMIN (HUMAN), 5%, 250 ML: HCPCS | Mod: JZ,JG | Performed by: PATHOLOGY

## 2023-08-03 PROCEDURE — 25000003 PHARM REV CODE 250: Performed by: PATHOLOGY

## 2023-08-03 PROCEDURE — 11000001 HC ACUTE MED/SURG PRIVATE ROOM

## 2023-08-03 PROCEDURE — 92507 TX SP LANG VOICE COMM INDIV: CPT

## 2023-08-03 PROCEDURE — C1751 CATH, INF, PER/CENT/MIDLINE: HCPCS

## 2023-08-03 PROCEDURE — 36556 INSERT NON-TUNNEL CV CATH: CPT

## 2023-08-03 PROCEDURE — 99232 PR SUBSEQUENT HOSPITAL CARE,LEVL II: ICD-10-PCS | Mod: ,,, | Performed by: STUDENT IN AN ORGANIZED HEALTH CARE EDUCATION/TRAINING PROGRAM

## 2023-08-03 RX ORDER — CALCIUM GLUCONATE 20 MG/ML
2 INJECTION, SOLUTION INTRAVENOUS ONCE
Status: COMPLETED | OUTPATIENT
Start: 2023-08-04 | End: 2023-08-04

## 2023-08-03 RX ORDER — HEPARIN SODIUM 1000 [USP'U]/ML
2400 INJECTION, SOLUTION INTRAVENOUS; SUBCUTANEOUS ONCE
Status: COMPLETED | OUTPATIENT
Start: 2023-08-04 | End: 2023-08-04

## 2023-08-03 RX ORDER — ALBUMIN HUMAN 50 G/1000ML
100 SOLUTION INTRAVENOUS ONCE
Status: COMPLETED | OUTPATIENT
Start: 2023-08-03 | End: 2023-08-03

## 2023-08-03 RX ORDER — HEPARIN SODIUM 1000 [USP'U]/ML
3000 INJECTION, SOLUTION INTRAVENOUS; SUBCUTANEOUS ONCE
Status: COMPLETED | OUTPATIENT
Start: 2023-08-03 | End: 2023-08-03

## 2023-08-03 RX ORDER — CALCIUM GLUCONATE 20 MG/ML
2 INJECTION, SOLUTION INTRAVENOUS ONCE
Status: COMPLETED | OUTPATIENT
Start: 2023-08-03 | End: 2023-08-03

## 2023-08-03 RX ORDER — ALBUMIN HUMAN 50 G/1000ML
100 SOLUTION INTRAVENOUS ONCE
Status: COMPLETED | OUTPATIENT
Start: 2023-08-04 | End: 2023-08-04

## 2023-08-03 RX ADMIN — CALCIUM GLUCONATE 2 G: 20 INJECTION, SOLUTION INTRAVENOUS at 04:08

## 2023-08-03 RX ADMIN — ALBUMIN (HUMAN) 100 G: 12.5 SOLUTION INTRAVENOUS at 04:08

## 2023-08-03 RX ADMIN — HEPARIN SODIUM 2400 UNITS: 1000 INJECTION, SOLUTION INTRAVENOUS; SUBCUTANEOUS at 05:08

## 2023-08-03 NOTE — ANESTHESIA POSTPROCEDURE EVALUATION
Anesthesia Post Evaluation    Patient: Thania Carroll    Procedure(s) Performed: Procedure(s) (LRB):  Lumbar Puncture (N/A)    Final Anesthesia Type: general      Patient location during evaluation: PACU  Patient participation: Yes- Able to Participate  Level of consciousness: confused  Post-procedure vital signs: reviewed and stable  Pain management: adequate  Airway patency: patent  ROMERO mitigation strategies: Intraoperative administration of CPAP, nasopharyngeal airway, or oral appliance during sedation and Multimodal analgesia  PONV status at discharge: No PONV  Anesthetic complications: no      Cardiovascular status: hemodynamically stable  Respiratory status: unassisted  Hydration status: euvolemic  Follow-up not needed.          Vitals Value Taken Time   BP 93/61 08/03/23 0446   Temp 37.2 °C (98.9 °F) 08/03/23 0446   Pulse 63 08/03/23 0446   Resp 16 08/03/23 0446   SpO2 99 % 08/03/23 0446         Event Time   Out of Recovery 08/02/2023 15:47:00         Pain/Becca Score: Becca Score: 10 (8/2/2023  3:45 PM)

## 2023-08-03 NOTE — ASSESSMENT & PLAN NOTE
This is a 38 year-old female who presents as a direct admission from movement disorders clinic for work and treatment of behavioral changes accompanied by parkinsonism and upper extremity weakness/atrophy. This has been progressively worsening since at least August 2022. Initial changes were behavioral, with reduced motivation, slowness in thought/speech, with motor symptoms (bradykinesia, subsequent upper extremity weakness and atrophy) being evident afterwards. She has received an extensive outpatient workup for inherited neurologic disease, autoimmune disease, which aside from a positive GISEL, has been unrevealing. Previously diagnosed with catatonia. MRI brain in May 2023 reviewed by myself and Dr. Camara: shows bilateral atrophy of caudate and putamen, as well as bilateral frontal and temporal lobes. 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    -CT chest, abdomen, pelvis and transvaginal ultrasound both negative for ovarian mass  -EEG is without abnormalities consistent w/CJD or NMDA receptor encephalitis    Plan  ->CSF studies: cell count and differential, protein, glucose, cytology and flow cytometry, gram stain and culture, autoimmune encephalopathy panel ([facility] [place] sendout panel), RT-quIC ([facility] send[facility]): pending/in process. SP 2 LPs. Some of these results are on hold given previous LP testing for CJD  -> Discussed with apheresis team and primary team. Will plan for plasma exchange (PLEX) tentatively starting 8/3  - Neurology will continue to follow. Please call if any questions or concerns.

## 2023-08-03 NOTE — PROGRESS NOTES
[facility] Medicine  Progress Note    Patient Name: Thania Carroll  MRN: [id number]  Patient Class: IP- Inpatient   Admission Date: 2023  Length of Stay: 9 days  Attending Physician: Meredith Mitchell MD  Primary Care Provider: Joanna Primary Doctor        Subjective:     Principal Problem:Neurodegenerative disorder        HPI:  Thania Carroll is a 38 year old white woman,  2 para 2, with juvenile rheumatoid arthritis, catatonia. She lives in [place], [place]. She is . She has two children.   She presented to [facility] on 2023. She had been having progressive neurological deterioration over the past 8 months with weakness, speech changes, slowing of mentation, joint contractures of hands. She can ambulate with assistance and was cognizant of where she wanted to go in the house as her family assisted her. She was able to communicate only through yes-no questions. Her symptoms seemed to have worsened with her last pregnancy in 2022, though symptoms started prior to it. She first noticed she had trouble using her hands when holding her baby in May 2022 and told her mother about it in  or July. Her family noticed that she started slowing down in early . She took longer to wash her hair, had trouble orienting things, had trouble holding onto things and losing her , then noticed she was moving more slowly in general. She underwent two neurologic workups, one at [facility] ([facility]) and the other in [place] with neurologist Dr. Franco. She was diagnosed with catatonia at [facility]. She was referred to Psychiatry. Dr. Franco performed lumbar puncture. She was hospitalized in 2023 for further workup. Dr. Franco suspected an autoimmune disorder. She underwent an extended electroencephalogram at home that was normal. Her hands started to contract. She started having echolalia  (meaningles repetition) in September/October as well as palilalia (involuntary repeating words). She had staring episodes that lasted a minute or two. She noted more muscle atrophy in November 2022. She delivered in May 2023 and was still breastfeeding an eight week old baby. She had no speech for the last 3 months. Had had several falls over the past week. Her last fall was 3 days ago. She started having emotional lability in October/November 2022.  She was seen at [facility] Neurology clinic on 7/24/2023. The only medication she had ever been on was prednisone in January with no improvement, methylprednisolone with no response, and methotrexate. She was seen by Nohemy Brady PA-C and Dr. Harshal Lyons, who recommended lumbar puncture with encephalopathy panel to send to [facility] and movement panel to rule out NMDAr encephalitis and 14-3-3, total tau RT-QulC, neurofilament light chain, inpatient IVIG and methylprednisolone, electroencephalogram to evaluate for delta brush, electromyelogram. Her father noticed intermittent choking spells when eating.   Clinic and emergency department evaluations:  Cbc. Cmp. Cpk, lipid panel- nl  Heavy metal screen 6/2023 - nl  TFTs -nl  Movement Disorder, Autoimmune pannel, cerruloplasm, negative  NA 1:160  UPT negative  Trep Ab bucky  U tox neg  Lemont's D - neg  Lyme - neg  Hereditary Parkinson's - neg  CT head 1/2023  MRI head - done 6/15.23, some atrophy out of proportion for age, particularly in bilateral caudate and putamen noted by  Nohemy Brady PA-C.    She was admitted to Hospital Medicine Team N.      Overview/Hospital Course:  CT chest abdomen pelvis was done to assess for mass, particularly ovarian teratoma, which may be associated with autoimmune encephalitis (especially NMDA receptor encephalitis). It showed bilateral ground glass lung attenuation but did not show an ovarian tumor, so transvaginal ultrasound was done and also  showed no ovarian mass. She was put on continuous EEG to assess for abnormalities seen in NMDA receptor encephalitis (ie delta brush) vs abnormalities seen in other conditions such as Creutzfeld-Tripp Disease. Infection Control was consulted due to testing for prion disease. Fluoroscopy was consulted for lumbar puncture with opening pressure and CSF studies ordered: cell count and differential, protein, glucose, cytology and flow cytometry, gram stain and culture, autoimmune encephalopathy panel ([facility] [place] sendout panel), RT-quIC ([facility] prion center sendout lab). Neurology planned plasma exchange (PLEX) following lumbar puncture, and electromyography, likely outpatient. Lumbar puncture was done on 7/28/2023. CSF was used for labs ordered by the physician assistant she had seen in Neurology clinic rather than the labs ordered by the inpatient consult service, which made another lumbar puncture necessary before treatment.       Interval History: Anesthesia contacted for trialysis line placement for PLEX. Order placed for MPU. SLP evaluated, patient okay for dental soft diet with thin liquids.     Review of Systems   Constitutional:  Positive for activity change and fatigue.   Neurological:  Positive for speech difficulty. Negative for seizures.     Objective:     Vital Signs (Most Recent):  Temp: 97.8 °F (36.6 °C) (08/03/23 1107)  Pulse: 94 (08/03/23 1107)  Resp: 18 (08/03/23 1107)  BP: 100/62 (08/03/23 1107)  SpO2: 99 % (08/03/23 1107) Vital Signs (24h Range):  Temp:  [97.2 °F (36.2 °C)-98.9 °F (37.2 °C)] 97.8 °F (36.6 °C)  Pulse:  [51-94] 94  Resp:  [16-20] 18  SpO2:  [97 %-100 %] 99 %  BP: ()/(61-78) 100/62     Weight: 54.4 kg (119 lb 15.9 oz)  Body mass index is 20.6 kg/m².    Intake/Output Summary (Last 24 hours) at 8/3/2023 1313  Last data filed at 8/3/2023 0114  Gross per 24 hour   Intake 900.12 ml   Output --   Net 900.12 ml           Physical Exam  Constitutional:       General: She is not in acute  distress.     Appearance: She is well-developed and normal weight. She is not diaphoretic.   Pulmonary:      Effort: Pulmonary effort is normal. No respiratory distress.   Skin:     General: Skin is warm and dry.      Coloration: Skin is not jaundiced or pale.   Neurological:      Mental Status: She is alert.      Cranial Nerves: No facial asymmetry.      Motor: No tremor or seizure activity.   Psychiatric:         Attention and Perception: Attention normal.         Speech: She is noncommunicative.         Behavior: Behavior is not aggressive. Behavior is cooperative.             Significant Labs: All pertinent labs within the past 24 hours have been reviewed.    Significant Imaging: I have reviewed all pertinent imaging results/findings within the past 24 hours.      Assessment/Plan:      * Neurodegenerative disorder  Neurology following. CT and ultrasound showed no ovarian tumor. LP done but will need another to obtain more labs. Repeat LP done on 8/2/23. Anesthesia contacted for central line placement 8/3 - Plan for PLEX afterward.    Bradykinesia        Ground glass opacity present on imaging of lung  No fever or leukocytosis. No hypoxia. Her cough is weak but she has not been coughing. Probably aspirations. Can get a follow-up chest X-ray.     Oropharyngeal dysphagia  SLP. Dental soft diet. Family wanted to defer modified barium swallow study.    Breastfeeding (infant)  Patient opts to continue breastfeeding. Can call the [facility] [phone] if positive for prion disease.    Juvenile rheumatoid arthritis  Chronic, stable.  GISEL :160, autoimmune panel is negative  Rheu Factor Neg        VTE Risk Mitigation (From admission, onward)         Ordered     enoxaparin injection 40 mg  Daily         07/25/23 1802     IP VTE HIGH RISK PATIENT  Once         07/25/23 1802     Place sequential compression device  Until discontinued         07/25/23 1802                Discharge Planning   ALANNA: 8/9/2023     Code  Status: Full Code   Is the patient medically ready for discharge?: No    Reason for patient still in hospital (select all that apply): Patient trending condition and Treatment  Discharge Plan A: Home with family, Home Health   Discharge Delays: None known at this time              Meredith Mitchell MD  Department of Hospital Medicine   [facility] Neuro[facility] ([facility])

## 2023-08-03 NOTE — ASSESSMENT & PLAN NOTE
Neurology following. CT and ultrasound showed no ovarian tumor. LP done but will need another to obtain more labs. Repeat LP done on 8/2/23. Anesthesia contacted for central line placement 8/3 - Plan for PLEX afterward.

## 2023-08-03 NOTE — ANESTHESIA PROCEDURE NOTES
Trialysis Line    Diagnosis: Acute encephalopathy  Patient location during procedure: holding area  Timeout: 8/3/2023 3:05 PM  Procedure end time: 8/3/2023 3:33 PM    Staffing  Authorizing Provider: Ben Chamorro DO  Performing Provider: Ben Chamorro DO    Staffing  Performed: resident/CRNA   Performed by: Ben Chamorro DO  Authorized by: Ben Chamorro DO    Preanesthetic Checklist  Completed: patient identified, IV checked, site marked, risks and benefits discussed, surgical consent, monitors and equipment checked, pre-op evaluation, timeout performed and anesthesia consent given  Indication   Indication: vascular access     Anesthesia   local infiltration    Central Line   Skin Prep: skin prepped with ChloraPrep, skin prep agent completely dried prior to procedure  Sterile Barriers Followed: Yes    All five maximal barriers used- gloves, gown, cap, mask, and large sterile sheet    hand hygiene performed prior to central venous catheter insertion  Location: right internal jugular.   Catheter type: dialysis  Catheter Size: 14 Fr (13 Fr)  Inserted Catheter Length: 15 cm  Ultrasound: vascular probe with ultrasound   Vessel Caliber: medium, patent  Vascular Doppler:  not done, compressibility normal  Needle advanced into vessel with real time Ultrasound guidance.  Guidewire confirmed in vessel.  Image recorded and saved.  sterile gel and probe cover used in ultrasound-guided central venous catheter insertion   Manometry: Venous cannualation confirmed by visual estimation of blood vessel pressure using manometry.  Insertion Attempts: 1   Securement:chlorhexidine patch, sterile dressing applied, blood return through all ports and line sutured    Post-Procedure   X-Ray: no pneumothorax on x-ray, placement verified by x-ray and successful placement   Adverse Events:none      Guidewire  Guidewire removed intact, verified with nurse.

## 2023-08-03 NOTE — PLAN OF CARE
Problem: SLP  Goal: SLP Goal  Description: Speech Language Pathology Goals  Goals expected to be met by 8/9/23    1. Pt will participate in ongoing assessment of swallow function to determine safest, least restrictive means of nutrition/hydration  2. Educate Pt and family on aspiration precautions and SLP POC  3. Pt will establish yes/no response provided max cues  4. Pt orient x2 via any modality 50% of attempts or more, max cues  5. Pt will participate in ongoing assessment of language and cognition to determine ongoing POC needs       8/3/2023 1208 by Tenisha Su, CCC-SLP  Outcome: Ongoing, Progressing     Current goals remain ongoing. ST to continue to follow up 3x/week.     8/3/2023

## 2023-08-03 NOTE — SEDATION DOCUMENTATION
Pt arrived to MPU for a HD line placement. PT ID x2 and allergies reviewed with . Procedure began.

## 2023-08-03 NOTE — PLAN OF CARE
Problem: Adult Inpatient Plan of Care  Goal: Plan of Care Review  Outcome: Ongoing, Progressing  Goal: Patient-Specific Goal (Individualized)  Outcome: Ongoing, Progressing  Goal: Absence of Hospital-Acquired Illness or Injury  Outcome: Ongoing, Progressing  Goal: Optimal Comfort and Wellbeing  Outcome: Ongoing, Progressing  Goal: Readiness for Transition of Care  Outcome: Ongoing, Progressing     Problem: Fall Injury Risk  Goal: Absence of Fall and Fall-Related Injury  Outcome: Ongoing, Progressing     Problem: Seizure, Active Management  Goal: Absence of Seizure/Seizure-Related Injury  Outcome: Ongoing, Progressing     Problem: Infection  Goal: Absence of Infection Signs and Symptoms  Outcome: Ongoing, Progressing     Problem: Skin Injury Risk Increased  Goal: Skin Health and Integrity  Outcome: Ongoing, Progressing

## 2023-08-03 NOTE — PT/OT/SLP PROGRESS
Physical Therapy      Patient Name:  Thania Carroll   MRN:  [id number]    Patient not seen today secondary to Off the floor for procedure/surgery. Will follow-up per POC as appropriate.

## 2023-08-03 NOTE — PROGRESS NOTES
"[facility] - Neurosurgery ([facility])  Neurology  Progress Note    Patient Name: Thania Carroll  MRN: [id number]  Admission Date: 7/25/2023  Hospital Length of Stay: 9 days  Code Status: Full Code   Attending Provider: Meredith Mitchell MD  Primary Care Physician: Joanna, Primary Doctor   Principal Problem:Neurodegenerative disorder    HPI:   Ms. Carroll is a 38 year-old female with a history of juvenile rheumatoid arthritis, who presents as a direct admission from movement disorders clinic for inpatient lumbar puncture for work-up of progressively worsening behavioral changes and parkinsonism. History is obtained per discussion with movement disorders clinic staff (Nohemy Brady), chart review, and from the patient's father who is at bedside. The patient has been experiencing the above changes since 2022, and per chart review and discussion with family it appears that there was significant worsening around August and then November of that year. Initial symptoms are described by family as a "slowing of her thinking and motivation." She was slow to respond to others, less communicative, and was having more difficulties with ADLs, including taking care of her baby. As this worsened, additional motor symptoms became evident. Bradykinesia was initially predominant, affecting first the bilateral upper extremities, and then the lower extremities particularly in regard to slowing of gait and turning progressing to gait instability with falls. The distal upper extremities also became progressively weaker, leading to difficulty with motor tasks and further impairment of daily activities, and subsequent palmar muscle atrophy and bilateral "clawing" of the hands. She was become markedly less communicative over the last three or so months per family, and per chart review has been exhibiting both echolalia and palilalia since September/October of last year. Additionally has suffered from dysphagia to both solids and liquids. All of " this has progressed to the point of her being unable to work, and ultimately unable to care for herself.    She originally presented to [facility] for bradykinesia and apathy. Received a diagnosis of catatonia and was referred to psychiatry. Seen by neurology in [facility] in 11/2022 and was again diagnosed with catatonia. Additionally seen by neurology in [place], and there was concern to autoimmune disorder, however was reportedly lost to follow-up. Currently follows in the [facility] movement disorders clinic. She has undergone an extensive outpatient work-up thus far, including testing for hakan's disease, hereditary forms of parkinson's disease, frontotemporal dementia, NMDA receptor encephalitis, autoimmune disease (GISEL, dsDNA, smith), Varun's disease, Sjorgen's: all of which appears to have been negative, aside from positive GISEL. Additionally had peripheral blood smear done, which was without acanthocytosis. MRI brain in 2023 demonstrates frontal and temporal lobe atrophy, as well as bilateral caudate and putamen atrophy. She has previously received 5 day course of pulse-dose steroids without any improvement. 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 Admitted to hospital medicine with neurology consultation.      Review of Systems   Unable to perform ROS: Mental status change     Objective:     Vital Signs (Most Recent):  Temp: 98.3 °F (36.8 °C) (08/03/23 0821)  Pulse: 73 (08/03/23 0821)  Resp: 20 (08/03/23 0821)  BP: 104/72 (08/03/23 0821)  SpO2: 98 % (08/03/23 0821) Vital Signs (24h Range):  Temp:  [97.2 °F (36.2 °C)-98.9 °F (37.2 °C)] 98.3 °F (36.8 °C)  Pulse:  [51-76] 73  Resp:  [16-20] 20  SpO2:  [97 %-100 %] 98 %  BP: ()/(61-78) 104/72     Weight: 54.4 kg (119 lb 15.9 oz)  Body mass index is 20.6 kg/m².     Physical Exam   Neurological  Exam:  MENTAL STATUS  Level of consciousness: alert  Orientation: unable to answer orientation questions  Moves all four extremities spontaneously.  Blinks to threat.  Occasionally verbalizes.  Able to follow commands, including blinking, getting up to walk.  No startle myoclonus.    CRANIAL NERVES  CN III, IV, VI: PERRL, EOMI  CN V: facial sensation intact, muscles of mastication intact  CN VII: hypomimia  CN IX, X: speech, when present, is soft and monotone    MOTOR EXAM  Muscle bulk: generally diminished, though this is most evident in the bilateral upper extremities, with profound palmar atrophy  Muscle tone: rigidity, most prominent in the bilateral upper extremities (distal >proximal); no spasticity    Strength - Upper Extremities   Arm abduction Elbow flexion Elbow extension Wrist flexion Wrist extension Finger abduction   Right 4/5 4/5 4/5 4/5 4/5 3/5   Left 4/5 4/5 4/5 4/5 4/5 3/5     Strength - Lower Extremities   Hip flexion Knee flexion Knee extension Dorsiflexion Plantarflexion   Right 5/5 5/5 5/5 5/5 5/5   Left 5/5 5/5 5/5 5/5 5/5     REFLEXES   Biceps Triceps Brachioradialis Patellar Achilles   Right +2 +2 +2 +2 +2   Left +2 +2 +2 +2 +2     Planter reflex: down-going bilaterally    SENSORY EXAM  Withdraws to pain in all four extremities. Assessment of specific modalities (vibration, pin-prick, temperature, proprioception) is limited due to mental status.    COORDINATION  Tremor: none  Gait: slow gait with reduced arm swing and slow turning.       Significant Labs: All pertinent lab results from the past 24 hours have been reviewed.    Significant Imaging: I have reviewed all pertinent imaging results/findings within the past 24 hours.    Assessment and Plan:     * Neurodegenerative disorder  This is a 38 year-old female who presents as a direct admission from movement disorders clinic for work and treatment of behavioral changes accompanied by parkinsonism and upper extremity weakness/atrophy. This  has been progressively worsening since at least August 2022. Initial changes were behavioral, with reduced motivation, slowness in thought/speech, with motor symptoms (bradykinesia, subsequent upper extremity weakness and atrophy) being evident afterwards. She has received an extensive outpatient workup for inherited neurologic disease, autoimmune disease, which aside from a positive GISEL, has been unrevealing. Previously diagnosed with catatonia. MRI brain in May 2023 reviewed by myself and Dr. Camara: shows bilateral atrophy of caudate and putamen, as well as bilateral frontal and temporal lobes. 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    -CT chest, abdomen, pelvis and transvaginal ultrasound both negative for ovarian mass  -EEG is without abnormalities consistent w/CJD or NMDA receptor encephalitis    Plan  ->CSF studies: cell count and differential, protein, glucose, cytology and flow cytometry, gram stain and culture, autoimmune encephalopathy panel ([facility] sendout panel), RT-quIC ([facility] prion [facility] sendout lab): pending/in process. SP 2 LPs. Some of these results are on hold given previous LP testing for CJD  -> Discussed with apheresis team and primary team. Will plan for plasma exchange (PLEX) tentatively starting 8/3  - Neurology will continue to follow. Please call if any questions or concerns.         VTE Risk Mitigation (From admission, onward)         Ordered     enoxaparin injection 40 mg  Daily         07/25/23 1802     IP VTE HIGH RISK PATIENT  Once         07/25/23 1802     Place sequential compression device  Until discontinued         07/25/23 1802                Bryson Caballero MD  Neurology  [facility] - Neurosurgery ([facility])

## 2023-08-03 NOTE — ASSESSMENT & PLAN NOTE
Patient opts to continue breastfeeding. Can call the [facility] [phone] if positive for prion disease.

## 2023-08-03 NOTE — SUBJECTIVE & OBJECTIVE
Interval History: Anesthesia contacted for trialysis line placement for PLEX. Order placed for MPU. SLP evaluated, patient okay for dental soft diet with thin liquids.     Review of Systems   Constitutional:  Positive for activity change and fatigue.   Neurological:  Positive for speech difficulty. Negative for seizures.     Objective:     Vital Signs (Most Recent):  Temp: 97.8 °F (36.6 °C) (08/03/23 1107)  Pulse: 94 (08/03/23 1107)  Resp: 18 (08/03/23 1107)  BP: 100/62 (08/03/23 1107)  SpO2: 99 % (08/03/23 1107) Vital Signs (24h Range):  Temp:  [97.2 °F (36.2 °C)-98.9 °F (37.2 °C)] 97.8 °F (36.6 °C)  Pulse:  [51-94] 94  Resp:  [16-20] 18  SpO2:  [97 %-100 %] 99 %  BP: ()/(61-78) 100/62     Weight: 54.4 kg (119 lb 15.9 oz)  Body mass index is 20.6 kg/m².    Intake/Output Summary (Last 24 hours) at 8/3/2023 1313  Last data filed at 8/3/2023 0114  Gross per 24 hour   Intake 900.12 ml   Output --   Net 900.12 ml           Physical Exam  Constitutional:       General: She is not in acute distress.     Appearance: She is well-developed and normal weight. She is not diaphoretic.   Pulmonary:      Effort: Pulmonary effort is normal. No respiratory distress.   Skin:     General: Skin is warm and dry.      Coloration: Skin is not jaundiced or pale.   Neurological:      Mental Status: She is alert.      Cranial Nerves: No facial asymmetry.      Motor: No tremor or seizure activity.   Psychiatric:         Attention and Perception: Attention normal.         Speech: She is noncommunicative.         Behavior: Behavior is not aggressive. Behavior is cooperative.             Significant Labs: All pertinent labs within the past 24 hours have been reviewed.    Significant Imaging: I have reviewed all pertinent imaging results/findings within the past 24 hours.

## 2023-08-03 NOTE — PROGRESS NOTES
Pt arrived to the outpt apheresis dept on a stretcher, accompanied by MPU staff.  She is alert, unable to access orientation and pain as pt is nonverbal.  Pt does not appear in distress and smiles often.  Apheresis tx #1 started at 16:29 without difficulty.  2 liter plasma exchange completed via RIJ cath, cath patent, dressing clean, dry and intact.  Replacement fluids 5% albumin.  2 grams of calcium gluconate given over duration of exchange.  Tx ended at 17:23.  Cath flushed and locked.  Pt tolerated tx well.  Next tx 08/04/2023.   at bedside for duration of exchange.  Pt escorted back to her assigned hospital room, 922, by this nurse.  Report given to DIOMEDES De La O.

## 2023-08-03 NOTE — PT/OT/SLP PROGRESS
"Speech Language Pathology Treatment    Patient Name:  Thania Carroll   MRN:  [id number]  Admitting Diagnosis: Neurodegenerative disorder    Recommendations:                 General Recommendations:  Dysphagia therapy, Speech/language therapy, and Modified barium swallow study when feasible  Diet recommendations:  Provided MD clearance, Dental Soft, Thin   Aspiration precautions   only when vital signs stable, 1 bite/sip at a time, Assistance with meals, Avoid talking while eating, Eliminate distractions, Feed only when awake/alert/attentive, Frequent oral care, HOB to 90 degrees,  Monitor for s/s of aspiration, Small bites/sips, and Strict aspiration precautions Continue to monitor for signs and symptoms of aspiration and discontinue oral feeding should you notice any of the following: watery eyes, reddened facial area, wet vocal quality, increased work of breathing, change in respiratory status, increased congestion, coughing, fever and/or change in level of alertness.  General Precautions: Standard, aspiration, fall  Communication strategies:  provide increased time to answer, go to room if call light pushed, and use yes/no questions to clarify        Assessment:     Thania Carroll is a 38 y.o. female with an SLP diagnosis of Dysphagia, Dysarthria, and Cognitive-Linguistic Impairment.  Risk of aspiration remains due to decreased endurance.  Should change in status occur, further objective assessment via MBSS likely warranted to determine safest, least restrictive means nutrition/hydration. Findings reviewed with MD.     Subjective     SLP reviewed Pt with RN, RN explained Pt NPO for pending PICC line placement  Pt presents alert, emotionally labile  Pt explains, "Paulo"  Spouse explains, "We could do it outpatient" re: MBSS procedure previously scheduled which was deferred by family    Pain/Comfort:  Pain Rating 1: other (see comments) (difficult to assess 2/2 decreased receptive/expressive language)    Respiratory " Status: Room air    Objective:     Has the patient been evaluated by SLP for swallowing?   Yes  Keep patient NPO? No   Current Respiratory Status:   Room air      Pt found awake and partially upright in the bed. Spouse at the bedside repositioned Pt in bed and elevated HOB. SLP unable to elicit vocalization via automatic or responsive speech tasks provided max cues. Pt with occasional laughter and spontaneous vocalizations in response to questions from Spouse at the bedside.  Pt with significantly breathy vocal quality with low intensity upon minimal vocalizations elicited.  Pt responded to yes/no questions 10% of attempts provided max cues. SLP unable to elicit commands provided max cues. PO trials deferred this service day 2/2 RN hold/NPO for pending procedure (central line placement.) Pt's Spouse at the bedside asked questions about dysphagia diets and aspiration. SLP educated Pt and Spouse on SLP role, definition and risk of aspiration and silent aspiration, aspiration precautions, S/S aspiration, dysphagia diets, Modified Barium Swallow Study, compensatory strategies for fx communication and ongoing SLP POC. Pt did not demonstrate or verbalize understanding. Spouse verbalized understanding.  Spouse asked additional questions about Pt medical POC, questions deferred to MD.  Pt remained in bed with Spouse in the room upon SLP exit. RN and MD notified of findings and Spouse's questions.     Goals:   Multidisciplinary Problems       SLP Goals          Problem: SLP    Goal Priority Disciplines Outcome   SLP Goal     SLP Ongoing, Progressing   Description: Speech Language Pathology Goals  Goals expected to be met by 8/9/23    1. Pt will participate in ongoing assessment of swallow function to determine safest, least restrictive means of nutrition/hydration  2. Educate Pt and family on aspiration precautions and SLP POC  3. Pt will establish yes/no response provided max cues  4. Pt orient x2 via any modality 50% of  attempts or more, max cues  5. Pt will participate in ongoing assessment of language and cognition to determine ongoing POC needs                          Plan:     Patient to be seen:  4 x/week   Plan of Care expires:  08/25/23  Plan of Care reviewed with:  patient, spouse   SLP Follow-Up:  Yes       Discharge recommendations: pending progress,  outpatient speech therapy       Time Tracking:     SLP Treatment Date:   08/03/23  Speech Start Time:  1105  Speech Stop Time:  1134     Speech Total Time (min):  29 min    Billable Minutes: Speech Therapy Individual 10 and Self Care/Home Management Training 19 08/03/2023

## 2023-08-03 NOTE — NURSING
1755- Patient transfer from apheresis department via stretcher- alert- non-verbal-bp 103/67- pulse 98 strong- respiration 18 even and unlabored - nadn- RIJ / Dialysis cath/ dry/intact- Nurse will continue to monitor for any change in condition....

## 2023-08-03 NOTE — SEDATION DOCUMENTATION
Pt tolerated procedure well. VS stable throughout procedure. Pt to get CXR and then return to floor. Report called to DIOMEDES De La O.

## 2023-08-04 LAB
ACE CSF-CCNC: 1.2 U/L (ref 0–2.5)
CRYPTOC AG CSF QL LA: NEGATIVE

## 2023-08-04 PROCEDURE — 99232 SBSQ HOSP IP/OBS MODERATE 35: CPT | Mod: ,,, | Performed by: STUDENT IN AN ORGANIZED HEALTH CARE EDUCATION/TRAINING PROGRAM

## 2023-08-04 PROCEDURE — 99232 PR SUBSEQUENT HOSPITAL CARE,LEVL II: ICD-10-PCS | Mod: ,,, | Performed by: STUDENT IN AN ORGANIZED HEALTH CARE EDUCATION/TRAINING PROGRAM

## 2023-08-04 PROCEDURE — 97535 SELF CARE MNGMENT TRAINING: CPT

## 2023-08-04 PROCEDURE — 63600175 PHARM REV CODE 636 W HCPCS: Mod: JZ,JG | Performed by: PATHOLOGY

## 2023-08-04 PROCEDURE — P9045 ALBUMIN (HUMAN), 5%, 250 ML: HCPCS | Mod: JZ,JG | Performed by: PATHOLOGY

## 2023-08-04 PROCEDURE — 97116 GAIT TRAINING THERAPY: CPT | Mod: CQ

## 2023-08-04 PROCEDURE — 36514 PR THER APHERESIS,PLASMA PHERESIS: ICD-10-PCS | Mod: ,,, | Performed by: PATHOLOGY

## 2023-08-04 PROCEDURE — 99233 PR SUBSEQUENT HOSPITAL CARE,LEVL III: ICD-10-PCS | Mod: ,,, | Performed by: PSYCHIATRY & NEUROLOGY

## 2023-08-04 PROCEDURE — 36514 APHERESIS PLASMA: CPT

## 2023-08-04 PROCEDURE — 25000003 PHARM REV CODE 250: Performed by: PATHOLOGY

## 2023-08-04 PROCEDURE — 99233 SBSQ HOSP IP/OBS HIGH 50: CPT | Mod: ,,, | Performed by: PSYCHIATRY & NEUROLOGY

## 2023-08-04 PROCEDURE — 36514 APHERESIS PLASMA: CPT | Mod: ,,, | Performed by: PATHOLOGY

## 2023-08-04 PROCEDURE — 11000001 HC ACUTE MED/SURG PRIVATE ROOM

## 2023-08-04 PROCEDURE — 97530 THERAPEUTIC ACTIVITIES: CPT | Mod: CQ

## 2023-08-04 RX ORDER — ALBUMIN HUMAN 50 G/1000ML
100 SOLUTION INTRAVENOUS ONCE
Status: COMPLETED | OUTPATIENT
Start: 2023-08-05 | End: 2023-08-05

## 2023-08-04 RX ORDER — HEPARIN SODIUM 1000 [USP'U]/ML
2400 INJECTION, SOLUTION INTRAVENOUS; SUBCUTANEOUS ONCE
Status: COMPLETED | OUTPATIENT
Start: 2023-08-05 | End: 2023-08-05

## 2023-08-04 RX ORDER — CALCIUM GLUCONATE 20 MG/ML
2 INJECTION, SOLUTION INTRAVENOUS ONCE
Status: COMPLETED | OUTPATIENT
Start: 2023-08-05 | End: 2023-08-05

## 2023-08-04 RX ADMIN — CALCIUM GLUCONATE 2 G: 20 INJECTION, SOLUTION INTRAVENOUS at 09:08

## 2023-08-04 RX ADMIN — ALBUMIN (HUMAN) 100 G: 12.5 SOLUTION INTRAVENOUS at 09:08

## 2023-08-04 RX ADMIN — HEPARIN SODIUM 2400 UNITS: 1000 INJECTION, SOLUTION INTRAVENOUS; SUBCUTANEOUS at 09:08

## 2023-08-04 NOTE — SUBJECTIVE & OBJECTIVE
Review of Systems   Unable to perform ROS: Mental status change     Objective:     Vital Signs (Most Recent):  Temp: 97.8 °F (36.6 °C) (08/04/23 1317)  Pulse: 90 (08/04/23 1317)  Resp: 18 (08/04/23 1317)  BP: 110/75 (08/04/23 1317)  SpO2: 96 % (08/04/23 1317) Vital Signs (24h Range):  Temp:  [96.5 °F (35.8 °C)-99 °F (37.2 °C)] 97.8 °F (36.6 °C)  Pulse:  [68-90] 90  Resp:  [18-22] 18  SpO2:  [96 %-98 %] 96 %  BP: ()/(56-81) 110/75     Weight: 54 kg (119 lb)  Body mass index is 20.43 kg/m².     Physical Exam   Neurological Exam:  MENTAL STATUS  Level of consciousness: alert  Orientation: unable to answer orientation questions  Moves all four extremities spontaneously.  Blinks to threat.  Occasionally verbalizes.  Able to follow commands, including blinking, getting up to walk.  No startle myoclonus.    CRANIAL NERVES  CN III, IV, VI: PERRL, EOMI  CN V: facial sensation intact, muscles of mastication intact  CN VII: hypomimia  CN IX, X: speech, when present, is soft and monotone    MOTOR EXAM  Muscle bulk: generally diminished, though this is most evident in the bilateral upper extremities, with profound palmar atrophy  Muscle tone: rigidity, most prominent in the bilateral upper extremities (distal >proximal); no spasticity    Strength - Upper Extremities   Arm abduction Elbow flexion Elbow extension Wrist flexion Wrist extension Finger abduction   Right 4/5 4/5 4/5 4/5 4/5 3/5   Left 4/5 4/5 4/5 4/5 4/5 3/5     Strength - Lower Extremities   Hip flexion Knee flexion Knee extension Dorsiflexion Plantarflexion   Right 5/5 5/5 5/5 5/5 5/5   Left 5/5 5/5 5/5 5/5 5/5     REFLEXES   Biceps Triceps Brachioradialis Patellar Achilles   Right +2 +2 +2 +2 +2   Left +2 +2 +2 +2 +2     Planter reflex: down-going bilaterally    SENSORY EXAM  Withdraws to pain in all four extremities. Assessment of specific modalities (vibration, pin-prick, temperature, proprioception) is limited due to mental status.    COORDINATION  Tremor:  none  Gait: slow gait with reduced arm swing and slow turning.       Significant Labs: All pertinent lab results from the past 24 hours have been reviewed.    Significant Imaging:   I have reviewed all pertinent imaging results/findings within the past 24 hours.

## 2023-08-04 NOTE — PT/OT/SLP PROGRESS
Speech Language Pathology Treatment    Patient Name:  Thania Carroll   MRN:  [id number]  Admitting Diagnosis: Neurodegenerative disorder    Recommendations:                 General Recommendations:  Dysphagia therapy, Speech/language therapy  Diet recommendations:  Provided MD clearance, Dental Soft, Thin   Aspiration precautions   only when vital signs stable, 1 bite/sip at a time, Assistance with meals, Avoid talking while eating, Eliminate distractions, Feed only when awake/alert/attentive, Frequent oral care, HOB to 90 degrees,  Monitor for s/s of aspiration, Small bites/sips, and Strict aspiration precautions Continue to monitor for signs and symptoms of aspiration and discontinue oral feeding should you notice any of the following: watery eyes, reddened facial area, wet vocal quality, increased work of breathing, change in respiratory status, increased congestion, coughing, fever and/or change in level of alertness.  General Precautions: Standard, aspiration, fall  Communication strategies:  provide increased time to answer, go to room if call light pushed, and use yes/no questions to clarify        Assessment:     Thania Carroll is a 38 y.o. female with an SLP diagnosis of Dysphagia, Dysarthria, and Cognitive-Linguistic Impairment.  Risk of aspiration remains due to decreased endurance. PLEX procedure being completed during SLP visit this date. Pt's spouse in agreement with SLP plan to downgrade frequency at this time as the pt's case is further medically managed.    Subjective     [facility] staff at bedside initiating PLEX treatment upon SLP entry. Spouse present at bedside.     Pain/Comfort:  Pain Rating 1: 0/10    Respiratory Status: Room air    Objective:     Has the patient been evaluated by SLP for swallowing?   Yes  Keep patient NPO? No   Current Respiratory Status:   Room air      Pt undergoing PLEX procedure upon SLP entry to room. Pt maintained eye contact with SLP t/o session. Pt's spouse expressed relief when  ind'ly reviewing recent imaging via MyChart that stated the lungs were clear. SLP offered education to the pt and spouse re: SLP role, current risk factors for aspiration, aspiration precautions, overt s/s of which to monitor for during meal time, ongoing diet recommendation, and SLP therapeutic picture as it relates to the overall medical management of the pt's case. Pt's spouse expressed understanding. SLP discussed plan to decreased POC frequency at this time w/ ongoing interdisciplinary communications re: SLP role. Spouse verbalized agreement with SLP plan. Pt did not demonstrate or verbalize understanding/agreement across education topics.     Goals:   Multidisciplinary Problems       SLP Goals          Problem: SLP    Goal Priority Disciplines Outcome   SLP Goal     SLP Ongoing, Progressing   Description: Speech Language Pathology Goals  Goals expected to be met by 8/9/23  1. Pt will participate in ongoing assessment of swallow function to determine safest, least restrictive means of nutrition/hydration  2. Educate Pt and family on aspiration precautions and SLP POC  3. Pt will establish yes/no response provided max cues  4. Pt orient x2 via any modality 50% of attempts or more, max cues  5. Pt will participate in ongoing assessment of language and cognition to determine ongoing POC needs                          Plan:     Patient to be seen:  2 x/week   Plan of Care expires:  08/25/23  Plan of Care reviewed with:  patient, spouse   SLP Follow-Up:  Yes       Discharge recommendations: pending progress,  other (see comments)     Time Tracking:     SLP Treatment Date:   08/04/23  Speech Start Time:  0855  Speech Stop Time:  0903     Speech Total Time (min):  8 min    Billable Minutes: Self Care/Home Management Training 8  08/04/2023

## 2023-08-04 NOTE — SUBJECTIVE & OBJECTIVE
Interval History: Patient received PLEX yesterday and this morning, tolerated well.     Review of Systems   Constitutional:  Positive for activity change and fatigue.   Neurological:  Positive for speech difficulty. Negative for seizures.     Objective:     Vital Signs (Most Recent):  Temp: 97.9 °F (36.6 °C) (08/04/23 0955)  Pulse: 79 (08/04/23 0955)  Resp: 18 (08/04/23 0955)  BP: 107/69 (08/04/23 0955)  SpO2: 96 % (08/04/23 0548) Vital Signs (24h Range):  Temp:  [96.5 °F (35.8 °C)-99 °F (37.2 °C)] 97.9 °F (36.6 °C)  Pulse:  [68-87] 79  Resp:  [18-22] 18  SpO2:  [96 %-99 %] 96 %  BP: ()/(56-81) 107/69     Weight: 54 kg (119 lb)  Body mass index is 20.43 kg/m².    Intake/Output Summary (Last 24 hours) at 8/4/2023 1315  Last data filed at 8/4/2023 0949  Gross per 24 hour   Intake 5555 ml   Output 4447 ml   Net 1108 ml           Physical Exam  Constitutional:       General: She is not in acute distress.     Appearance: She is well-developed and normal weight. She is not diaphoretic.   Pulmonary:      Effort: Pulmonary effort is normal. No respiratory distress.   Skin:     General: Skin is warm and dry.      Coloration: Skin is not jaundiced or pale.   Neurological:      Mental Status: She is alert.      Cranial Nerves: No facial asymmetry.      Motor: No tremor or seizure activity.   Psychiatric:         Attention and Perception: Attention normal.         Speech: She is noncommunicative.         Behavior: Behavior is not aggressive. Behavior is cooperative.             Significant Labs: All pertinent labs within the past 24 hours have been reviewed.    Significant Imaging: I have reviewed all pertinent imaging results/findings within the past 24 hours.

## 2023-08-04 NOTE — PT/OT/SLP PROGRESS
Occupational Therapy      Patient Name:  Thania Carroll   MRN:  [id number]    Patient not seen today secondary to procedure (plex) . Will follow-up 8/7.    8/4/2023

## 2023-08-04 NOTE — PROGRESS NOTES
TPE #2    4503 Arrived to the patient's room to initiate treatment patient walking the halls with spouse. Pt back in room vitals are stable appears in no acute distress. Spoke with her nurse Loretta HERCULES pt is stable no problems. Pt is none verbal but will follow simple commands like if you are in pain blink your eye. Orientation is unable to access.     RIJ Trialysis line dressing CDI no signs symptoms of infection. Caps cleansed and 10 cc discarded from each line. Line flushed with 10 cc NS pushes and pulls well. 0900 treatment started patient tolerating cares well.    Medications:     5% Albumin 2 L   2 GM Calcium Gluc IV   Heparin 1.2 units in each port    0955Treatment completed all blood returned vitals are stable patient in no acute distress. Catheter packed with Heparin 1.2 units in each port. Report given to Danika HERCULES patient left in her room in stable condition with spouse at bedside. Spouse is aware patient next treatment will be on Saturday.

## 2023-08-04 NOTE — PT/OT/SLP PROGRESS
Physical Therapy Treatment    Patient Name:  Thania Carroll   MRN:  [id number]    Recommendations:     Discharge Recommendations: other (see comments)  Discharge Equipment Recommendations: shower chair  Barriers to discharge: None    Assessment:     Thania Carroll is a 38 y.o. female admitted with a medical diagnosis of Neurodegenerative disorder.  She presents with the following impairments/functional limitations: weakness, impaired endurance, impaired sensation, impaired self care skills, impaired functional mobility, gait instability, impaired balance, decreased upper extremity function, decreased lower extremity function, decreased safety awareness, impaired coordination, impaired fine motor, decreased ROM, decreased coordination.    Pt tolerates session well with focus on bed mobility, transfers, and gait training. Pt progressing well with minor command following noted throughout session. Basic commands followed including items such as directional changes during gait and movements with bed mobility. Pt with less posterior vaulting from stairs as seen in prior session but posterior lean during stair training remains. Tendency for pt to automatically attempt to continue climbing stairs rather than performing single step as cued by therapist. Pt returned to room following prolonged gait trials and assisted back to bed. Pt will continue to benefit from therapy services to address impairments listed above.     Rehab Prognosis: Good; patient would benefit from acute skilled PT services to address these deficits and reach maximum level of function.    Recent Surgery: Procedure(s) (LRB):  MPU PROCEDURE (N/A) 1 Day Post-Op    Plan:     During this hospitalization, patient to be seen 3 x/week to address the identified rehab impairments via gait training, therapeutic activities, therapeutic exercises, neuromuscular re-education and progress toward the following goals:    Plan of Care Expires:  08/26/23    Subjective     Chief  Complaint: no c/o  Patient/Family Comments/goals: non-verbal  Pain/Comfort:  Pain Rating 1: 0/10  Pain Rating Post-Intervention 1: 0/10      Objective:     Communicated with RN prior to session.  Patient found HOB elevated with telemetry upon PTA entry to room. Mother at bedside.     General Precautions: Standard, aspiration, fall  Orthopedic Precautions: N/A  Braces: N/A  Respiratory Status: Room air     Functional Mobility:  Bed Mobility:     Supine to Sit: moderate assistance  Sit to Supine: minimum assistance  Transfers:     Sit to Stand:  contact guard assistance with hand-held assist  Gait: Pt ambulates greater than 500 ft with no AD and Min A for balance. Unsteady with intermittent LOBs laterally. LOB with all 180 degree turns as pt gets feet tangled.   Stairs:  Pt ascended/descended 1 stair(s) x 3 trials with No Assistive Device with no handrails with Moderate Assistance.       AM-PAC 6 CLICK MOBILITY  Turning over in bed (including adjusting bedclothes, sheets and blankets)?: 3  Sitting down on and standing up from a chair with arms (e.g., wheelchair, bedside commode, etc.): 3  Moving from lying on back to sitting on the side of the bed?: 3  Moving to and from a bed to a chair (including a wheelchair)?: 3  Need to walk in hospital room?: 3  Climbing 3-5 steps with a railing?: 3  Basic Mobility Total Score: 18       Patient left HOB elevated with all lines intact, call button in reach, and mother present.    GOALS:   Multidisciplinary Problems       Physical Therapy Goals          Problem: Physical Therapy    Goal Priority Disciplines Outcome Goal Variances Interventions   Physical Therapy Goal     PT, PT/OT Ongoing, Progressing     Description: Goals to be met by: 2023     Patient will increase functional independence with mobility by performin. Supine to sit with Stand-by Assistance  2. Sit to stand from bedside chair with Supervision  3. Gait  x 200 feet with Supervision using No Assistive  Device and no LOB  4. Ascend/descend 2 stair with no Handrails Stand-by Assistance using No Assistive Device.                          Time Tracking:     PT Received On: 08/04/23  PT Start Time: 1341     PT Stop Time: 1404  PT Total Time (min): 23 min     Billable Minutes: Gait Training 15 and Therapeutic Activity 8    Treatment Type: Treatment  PT/PTA: PTA     Number of PTA visits since last PT visit: 1 08/04/2023

## 2023-08-04 NOTE — ASSESSMENT & PLAN NOTE
Neurology following. CT and ultrasound showed no ovarian tumor. LP done but will need another to obtain more labs. Repeat LP done on 8/2/23. Anesthesia contacted for central line placement 8/3. PLEX started on 8/3

## 2023-08-04 NOTE — ASSESSMENT & PLAN NOTE
Patient opts to continue breastfeeding. Can call the [facility] [phone] if positive for prion disease.

## 2023-08-04 NOTE — ASSESSMENT & PLAN NOTE
This is a 38 year-old female who presents as a direct admission from movement disorders clinic for work and treatment of behavioral changes accompanied by parkinsonism and upper extremity weakness/atrophy. This has been progressively worsening since at least August 2022. Initial changes were behavioral, with reduced motivation, slowness in thought/speech, with motor symptoms (bradykinesia, subsequent upper extremity weakness and atrophy) being evident afterwards. She has received an extensive outpatient workup for inherited neurologic disease, autoimmune disease, which aside from a positive GISEL, has been unrevealing. Previously diagnosed with catatonia. MRI brain in May 2023 reviewed by myself and Dr. Camara: shows bilateral atrophy of caudate and putamen, as well as bilateral frontal and temporal lobes. 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    -CT chest, abdomen, pelvis and transvaginal ultrasound both negative for ovarian mass  -EEG is without abnormalities consistent w/CJD or NMDA receptor encephalitis    Plan  ->CSF studies: cell count and differential, protein, glucose, cytology and flow cytometry, gram stain and culture, autoimmune encephalopathy panel ([facility] [place] sendout panel), RT-quIC ([facility]): pending/in process. SP 2 LPs. Some of these results are on hold given previous LP testing for CJD  -> Discussed with apheresis team and primary team. Will plan for plasma exchange (PLEX) tentatively starting 8/3  - Neurology will continue to follow. Please call if any questions or concerns.

## 2023-08-04 NOTE — PROCEDURES
[facility] - Neurosurgery ([facility])  Transfusion Medicine  Procedure Note    SUMMARY   Therapeutic Plasma Exchange (Apheresis)    Date/Time: 8/4/2023 2:50 PM    Performed by: David Merino MD  Authorized by: David Merino MD        Date of Procedure: 8/4/2023     Procedure: Plasma Exchange    Provider: David Merino MD     Assisting Provider: None    Pre-Procedure Diagnosis: Neurodegenerative disorder    Post-Procedure Diagnosis: Neurodegenerative disorder    Follow-up Assessment: Very difficult case of a 37 y/o female with a complex neurological disorder of unknown etiology, with autoimmune encephalitis as a possible choice.  Neurology would like to consider a series of Plasma Exchanges.  We concur and will hopefully start tomorrow after a central line is placed.  Plan is 5 procedures over 5-7 days.  Avoid any ACE Inhibitors during the next week.      Today's PLEX, #2 of 5, completed without complications and was well tolerated.  Next PLEX planned for 08/05/23.    Pertinent Laboratory Data: Complete Blood Count:   Lab Results   Component Value Date    HGB 13.8 07/28/2023    HCT 41.8 07/28/2023     07/28/2023    WBC 5.85 07/28/2023     Comprehensive Metabolic Panel:   Lab Results   Component Value Date     07/28/2023    K 3.8 07/28/2023     07/28/2023    CO2 22 (L) 07/28/2023    GLU 86 07/28/2023    BUN 17 07/28/2023    CREATININE 0.8 07/28/2023    CALCIUM 9.7 07/28/2023    PROT 6.8 07/28/2023    ALBUMIN 3.8 07/28/2023    BILITOT 0.6 07/28/2023    ALKPHOS 91 07/28/2023    AST 24 07/28/2023    ALT 41 07/28/2023    ANIONGAP 10 07/28/2023       Pertinent Medications: None    Review of patient's allergies indicates:  No Known Allergies    Anesthesia: None     Technical Procedures Used: Plasma Exchange: Volume exchanged - 2.0 Liters; Replacement fluid - Albumin; Number of procedures 2; Date of next procedure 08/05/23.    Description of the Findings of the Procedure:     Please see Apheresis Nurse  flowsheet for details.    The patient was evaluated and all clinical and laboratory data relevant to the treatment was reviewed, and a decision was made to proceed with the Apheresis procedure.    I was available to the clinical staff throughout the procedure.    Significant Surgical Tasks Conducted by the Assistant(s): Not applicable    Complications: None    Estimated Blood Loss (EBL): None    Implants: None     Specimens: None

## 2023-08-04 NOTE — PROGRESS NOTES
"[facility] - Neurosurgery ([facility])  Neurology  Progress Note    Patient Name: Thania Carroll  MRN: [id number]  Admission Date: 7/25/2023  Hospital Length of Stay: 10 days  Code Status: Full Code   Attending Provider: Meredith Mitchell MD  Primary Care Physician: Joanna, Primary Doctor   Principal Problem:Neurodegenerative disorder    HPI:   Ms. Carroll is a 38 year-old female with a history of juvenile rheumatoid arthritis, who presents as a direct admission from movement disorders clinic for inpatient lumbar puncture for work-up of progressively worsening behavioral changes and parkinsonism. History is obtained per discussion with movement disorders clinic staff (Nohemy Brady), chart review, and from the patient's father who is at bedside. The patient has been experiencing the above changes since 2022, and per chart review and discussion with family it appears that there was significant worsening around August and then November of that year. Initial symptoms are described by family as a "slowing of her thinking and motivation." She was slow to respond to others, less communicative, and was having more difficulties with ADLs, including taking care of her baby. As this worsened, additional motor symptoms became evident. Bradykinesia was initially predominant, affecting first the bilateral upper extremities, and then the lower extremities particularly in regard to slowing of gait and turning progressing to gait instability with falls. The distal upper extremities also became progressively weaker, leading to difficulty with motor tasks and further impairment of daily activities, and subsequent palmar muscle atrophy and bilateral "clawing" of the hands. She was become markedly less communicative over the last three or so months per family, and per chart review has been exhibiting both echolalia and palilalia since September/October of last year. Additionally has suffered from dysphagia to both solids and liquids. All of " this has progressed to the point of her being unable to work, and ultimately unable to care for herself.    She originally presented to [facility] for bradykinesia and apathy. Received a diagnosis of catatonia and was referred to psychiatry. Seen by neurology in [facility] in 11/2022 and was again diagnosed with catatonia. Additionally seen by neurology in [place], and there was concern to autoimmune disorder, however was reportedly lost to follow-up. Currently follows in the [facility] movement disorders clinic. She has undergone an extensive outpatient work-up thus far, including testing for hakan's disease, hereditary forms of parkinson's disease, frontotemporal dementia, NMDA receptor encephalitis, autoimmune disease (GISEL, dsDNA, smith), Varun's disease, Sjorgen's: all of which appears to have been negative, aside from positive GISEL. Additionally had peripheral blood smear done, which was without acanthocytosis. MRI brain in 2023 demonstrates frontal and temporal lobe atrophy, as well as bilateral caudate and putamen atrophy. She has previously received 5 day course of pulse-dose steroids without any improvement. 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 Admitted to hospital medicine with neurology consultation.    Review of Systems   Unable to perform ROS: Mental status change     Objective:     Vital Signs (Most Recent):  Temp: 97.8 °F (36.6 °C) (08/04/23 1317)  Pulse: 90 (08/04/23 1317)  Resp: 18 (08/04/23 1317)  BP: 110/75 (08/04/23 1317)  SpO2: 96 % (08/04/23 1317) Vital Signs (24h Range):  Temp:  [96.5 °F (35.8 °C)-99 °F (37.2 °C)] 97.8 °F (36.6 °C)  Pulse:  [68-90] 90  Resp:  [18-22] 18  SpO2:  [96 %-98 %] 96 %  BP: ()/(56-81) 110/75     Weight: 54 kg (119 lb)  Body mass index is 20.43 kg/m².     Physical Exam   Neurological Exam:  MENTAL  STATUS  Level of consciousness: alert  Orientation: unable to answer orientation questions  Moves all four extremities spontaneously.  Blinks to threat.  Occasionally verbalizes.  Able to follow commands, including blinking, getting up to walk.  No startle myoclonus.    CRANIAL NERVES  CN III, IV, VI: PERRL, EOMI  CN V: facial sensation intact, muscles of mastication intact  CN VII: hypomimia  CN IX, X: speech, when present, is soft and monotone    MOTOR EXAM  Muscle bulk: generally diminished, though this is most evident in the bilateral upper extremities, with profound palmar atrophy  Muscle tone: rigidity, most prominent in the bilateral upper extremities (distal >proximal); no spasticity    Strength - Upper Extremities   Arm abduction Elbow flexion Elbow extension Wrist flexion Wrist extension Finger abduction   Right 4/5 4/5 4/5 4/5 4/5 3/5   Left 4/5 4/5 4/5 4/5 4/5 3/5     Strength - Lower Extremities   Hip flexion Knee flexion Knee extension Dorsiflexion Plantarflexion   Right 5/5 5/5 5/5 5/5 5/5   Left 5/5 5/5 5/5 5/5 5/5     REFLEXES   Biceps Triceps Brachioradialis Patellar Achilles   Right +2 +2 +2 +2 +2   Left +2 +2 +2 +2 +2     Planter reflex: down-going bilaterally    SENSORY EXAM  Withdraws to pain in all four extremities. Assessment of specific modalities (vibration, pin-prick, temperature, proprioception) is limited due to mental status.    COORDINATION  Tremor: none  Gait: slow gait with reduced arm swing and slow turning.       Significant Labs: All pertinent lab results from the past 24 hours have been reviewed.    Significant Imaging:   I have reviewed all pertinent imaging results/findings within the past 24 hours.    Assessment and Plan:     * Neurodegenerative disorder  This is a 38 year-old female who presents as a direct admission from movement disorders clinic for work and treatment of behavioral changes accompanied by parkinsonism and upper extremity weakness/atrophy. This has been  progressively worsening since at least August 2022. Initial changes were behavioral, with reduced motivation, slowness in thought/speech, with motor symptoms (bradykinesia, subsequent upper extremity weakness and atrophy) being evident afterwards. She has received an extensive outpatient workup for inherited neurologic disease, autoimmune disease, which aside from a positive GISEL, has been unrevealing. Previously diagnosed with catatonia. MRI brain in May 2023 reviewed by myself and Dr. Camara: shows bilateral atrophy of caudate and putamen, as well as bilateral frontal and temporal lobes. 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    -CT chest, abdomen, pelvis and transvaginal ultrasound both negative for ovarian mass  -EEG is without abnormalities consistent w/CJD or NMDA receptor encephalitis    Plan  ->CSF studies: cell count and differential, protein, glucose, cytology and flow cytometry, gram stain and culture, autoimmune encephalopathy panel ([facility] [place] sendout panel), RT-quIC ([facility] sendout lab): pending/in process. SP 2 LPs. Some of these results are on hold given previous LP testing for CJD  -> Discussed with apheresis team and primary team. Will plan for plasma exchange (PLEX) tentatively starting 8/3  - Neurology will continue to follow. Please call if any questions or concerns.         VTE Risk Mitigation (From admission, onward)         Ordered     heparin (porcine) injection 2,400 Units  Once         08/04/23 1112     enoxaparin injection 40 mg  Daily         07/25/23 1802     IP VTE HIGH RISK PATIENT  Once         07/25/23 1802     Place sequential compression device  Until discontinued         07/25/23 1802                Bryson Caballero MD  Neurology  [facility] - Neurosurgery ([facility])

## 2023-08-04 NOTE — PLAN OF CARE
Problem: Adult Inpatient Plan of Care  Goal: Plan of Care Review  Outcome: Ongoing, Progressing     Problem: Adult Inpatient Plan of Care  Goal: Patient-Specific Goal (Individualized)  Outcome: Ongoing, Progressing     Problem: Adult Inpatient Plan of Care  Goal: Absence of Hospital-Acquired Illness or Injury  Outcome: Ongoing, Progressing     Problem: Adult Inpatient Plan of Care  Goal: Optimal Comfort and Wellbeing  Outcome: Ongoing, Progressing     Problem: Adult Inpatient Plan of Care  Goal: Readiness for Transition of Care  Outcome: Ongoing, Progressing     Problem: Fall Injury Risk  Goal: Absence of Fall and Fall-Related Injury  Outcome: Ongoing, Progressing     Problem: Seizure, Active Management  Goal: Absence of Seizure/Seizure-Related Injury  Outcome: Ongoing, Progressing     Problem: Skin Injury Risk Increased  Goal: Skin Health and Integrity  Outcome: Ongoing, Progressing

## 2023-08-04 NOTE — PROCEDURES
[facility] - Neurosurgery ([facility])  Transfusion Medicine  Procedure Note    SUMMARY   Therapeutic Plasma Exchange (Apheresis)    Date/Time: 8/3/2023 2:53 PM    Performed by: David Merino MD  Authorized by: David Merino MD        Date of Procedure: 8/3/2023     Procedure: Plasma Exchange    Provider: David Merino MD     Assisting Provider: None    Pre-Procedure Diagnosis: Neurodegenerative disorder    Post-Procedure Diagnosis: Neurodegenerative disorder    Follow-up Assessment: Very difficult case of a 37 y/o female with a complex neurological disorder of unknown etiology, with autoimmune encephalitis as a possible choice.  Neurology would like to consider a series of Plasma Exchanges.  We concur and will hopefully start tomorrow after a central line is placed.  Plan is 5 procedures over 5-7 days.  Avoid any ACE Inhibitors during the next week.      Today's PLEX, #1 of 5, completed without complications and was well tolerated.  Next PLEX planned for 08/04/23.    Pertinent Laboratory Data: Complete Blood Count:   Lab Results   Component Value Date    HGB 13.8 07/28/2023    HCT 41.8 07/28/2023     07/28/2023    WBC 5.85 07/28/2023     Comprehensive Metabolic Panel:   Lab Results   Component Value Date     07/28/2023    K 3.8 07/28/2023     07/28/2023    CO2 22 (L) 07/28/2023    GLU 86 07/28/2023    BUN 17 07/28/2023    CREATININE 0.8 07/28/2023    CALCIUM 9.7 07/28/2023    PROT 6.8 07/28/2023    ALBUMIN 3.8 07/28/2023    BILITOT 0.6 07/28/2023    ALKPHOS 91 07/28/2023    AST 24 07/28/2023    ALT 41 07/28/2023    ANIONGAP 10 07/28/2023       Pertinent Medications: None    Review of patient's allergies indicates:  No Known Allergies    Anesthesia: None     Technical Procedures Used: Plasma Exchange: Volume exchanged - 2.0 Liters; Replacement fluid - Albumin; Number of procedures 1; Date of next procedure 08/04/23.    Description of the Findings of the Procedure:     Please see Apheresis Nurse  flowsheet for details.    The patient was evaluated and all clinical and laboratory data relevant to the treatment was reviewed, and a decision was made to proceed with the Apheresis procedure.    I was available to the clinical staff throughout the procedure.    Significant Surgical Tasks Conducted by the Assistant(s): Not applicable    Complications: None    Estimated Blood Loss (EBL): None    Implants: None     Specimens: None

## 2023-08-04 NOTE — PROGRESS NOTES
[facility] Neurosurgery [facility] Medicine  Progress Note    Patient Name: Thania Carroll  MRN: [id number]  Patient Class: IP- Inpatient   Admission Date: 2023  Length of Stay: 10 days  Attending Physician: Meredith Mitchell MD  Primary Care Provider: Joanna Primary Doctor        Subjective:     Principal Problem:Neurodegenerative disorder        HPI:  Thania Carroll is a 38 year old white woman,  2 para 2, with juvenile rheumatoid arthritis, catatonia. She lives in [place], [place]. She is . She has two children.   She presented to [facility] Emergency Department on 2023. She had been having progressive neurological deterioration over the past 8 months with weakness, speech changes, slowing of mentation, joint contractures of hands. She can ambulate with assistance and was cognizant of where she wanted to go in the house as her family assisted her. She was able to communicate only through yes-no questions. Her symptoms seemed to have worsened with her last pregnancy in 2022, though symptoms started prior to it. She first noticed she had trouble using her hands when holding her baby in May 2022 and told her mother about it in  or July. Her family noticed that she started slowing down in early . She took longer to wash her hair, had trouble orienting things, had trouble holding onto things and losing her , then noticed she was moving more slowly in general. She underwent two neurologic workups, one at [facility] and the other in [place] with neurologist Dr. Franco. She was diagnosed with catatonia at [facility]. She was referred to Psychiatry. Dr. Franco performed lumbar puncture. She was hospitalized in 2023 for further workup. Dr. Franco suspected an autoimmune disorder. She underwent an extended electroencephalogram at home that was normal. Her hands started to contract. She started having echolalia  (meaningles repetition) in September/October as well as palilalia (involuntary repeating words). She had staring episodes that lasted a minute or two. She noted more muscle atrophy in November 2022. She delivered in May 2023 and was still breastfeeding an eight week old baby. She had no speech for the last 3 months. Had had several falls over the past week. Her last fall was 3 days ago. She started having emotional lability in October/November 2022.  She was seen at [facility] Neurology clinic on 7/24/2023. The only medication she had ever been on was prednisone in January with no improvement, methylprednisolone with no response, and methotrexate. She was seen by Nohemy Brady PA-C and Dr. Harshal Lyons, who recommended lumbar puncture with encephalopathy panel to send to [facility] and movement panel to rule out NMDAr encephalitis and 14-3-3, total tau RT-QulC, neurofilament light chain, inpatient IVIG and methylprednisolone, electroencephalogram to evaluate for delta brush, electromyelogram. Her father noticed intermittent choking spells when eating.   Clinic and emergency department evaluations:  Cbc. Cmp. Cpk, lipid panel- nl  Heavy metal screen 6/2023 - nl  TFTs -nl  Movement Disorder, Autoimmune pannel, cerruloplasm, negative  NA 1:160  UPT negative  Trep Ab bucky  U tox neg  Kersey's D - neg  Lyme - neg  Hereditary Parkinson's - neg  CT head 1/2023  MRI head - done 6/15.23, some atrophy out of proportion for age, particularly in bilateral caudate and putamen noted by  Nohemy Brady PA-C.    She was admitted to Hospital Medicine Team N.      Overview/Hospital Course:  CT chest abdomen pelvis was done to assess for mass, particularly ovarian teratoma, which may be associated with autoimmune encephalitis (especially NMDA receptor encephalitis). It showed bilateral ground glass lung attenuation but did not show an ovarian tumor, so transvaginal ultrasound was done and also  showed no ovarian mass. She was put on continuous EEG to assess for abnormalities seen in NMDA receptor encephalitis (ie delta brush) vs abnormalities seen in other conditions such as Creutzfeld-Tripp Disease. Infection Control was consulted due to testing for prion disease. Fluoroscopy was consulted for lumbar puncture with opening pressure and CSF studies ordered: cell count and differential, protein, glucose, cytology and flow cytometry, gram stain and culture, autoimmune encephalopathy panel ([facility] [place] sendout panel), RT-quIC ([facility] prion center sendout lab). Neurology planned plasma exchange (PLEX) following lumbar puncture, and electromyography, likely outpatient. Lumbar puncture was done on 7/28/2023. CSF was used for labs ordered by the physician assistant she had seen in Neurology clinic rather than the labs ordered by the inpatient consult service, which made another lumbar puncture necessary before treatment. Lumbar puncture done, central line placed and PLEX started on 8/3/23.      Interval History: Patient received PLEX yesterday and this morning, tolerated well.     Review of Systems   Constitutional:  Positive for activity change and fatigue.   Neurological:  Positive for speech difficulty. Negative for seizures.     Objective:     Vital Signs (Most Recent):  Temp: 97.9 °F (36.6 °C) (08/04/23 0955)  Pulse: 79 (08/04/23 0955)  Resp: 18 (08/04/23 0955)  BP: 107/69 (08/04/23 0955)  SpO2: 96 % (08/04/23 0548) Vital Signs (24h Range):  Temp:  [96.5 °F (35.8 °C)-99 °F (37.2 °C)] 97.9 °F (36.6 °C)  Pulse:  [68-87] 79  Resp:  [18-22] 18  SpO2:  [96 %-99 %] 96 %  BP: ()/(56-81) 107/69     Weight: 54 kg (119 lb)  Body mass index is 20.43 kg/m².    Intake/Output Summary (Last 24 hours) at 8/4/2023 1315  Last data filed at 8/4/2023 0949  Gross per 24 hour   Intake 5555 ml   Output 4447 ml   Net 1108 ml           Physical Exam  Constitutional:       General: She is not in acute distress.     Appearance:  She is well-developed and normal weight. She is not diaphoretic.   Pulmonary:      Effort: Pulmonary effort is normal. No respiratory distress.   Skin:     General: Skin is warm and dry.      Coloration: Skin is not jaundiced or pale.   Neurological:      Mental Status: She is alert.      Cranial Nerves: No facial asymmetry.      Motor: No tremor or seizure activity.   Psychiatric:         Attention and Perception: Attention normal.         Speech: She is noncommunicative.         Behavior: Behavior is not aggressive. Behavior is cooperative.             Significant Labs: All pertinent labs within the past 24 hours have been reviewed.    Significant Imaging: I have reviewed all pertinent imaging results/findings within the past 24 hours.      Assessment/Plan:      * Neurodegenerative disorder  Neurology following. CT and ultrasound showed no ovarian tumor. LP done but will need another to obtain more labs. Repeat LP done on 8/2/23. Anesthesia contacted for central line placement 8/3. PLEX started on 8/3    Bradykinesia        Ground glass opacity present on imaging of lung  No fever or leukocytosis. No hypoxia. Her cough is weak but she has not been coughing. Probably aspirations. Can get a follow-up chest X-ray.     Oropharyngeal dysphagia  SLP. Dental soft diet. Family wanted to defer modified barium swallow study.    Breastfeeding (infant)  Patient opts to continue breastfeeding. Can call the [facility] [phone] if positive for prion disease.    Juvenile rheumatoid arthritis  Chronic, stable.  GISEL :160, autoimmune panel is negative  Rheu Factor Neg        VTE Risk Mitigation (From admission, onward)         Ordered     heparin (porcine) injection 2,400 Units  Once         08/04/23 1112     enoxaparin injection 40 mg  Daily         07/25/23 1802     IP VTE HIGH RISK PATIENT  Once         07/25/23 1802     Place sequential compression device  Until discontinued         07/25/23 1802                 Discharge Planning   ALANNA: 8/9/2023     Code Status: Full Code   Is the patient medically ready for discharge?: No    Reason for patient still in hospital (select all that apply): Patient trending condition  Discharge Plan A: Home with family, Home Health   Discharge Delays: None known at this time              Meredith Mitchell MD  Department of Hospital Medicine   [facility] - Neurosurgery ([facility])

## 2023-08-05 LAB
EV RNA SPEC QL NAA+PROBE: NEGATIVE
SPECIMEN SOURCE: NORMAL
T GONDII DNA CSF QL NAA+PROBE: NEGATIVE
VARICELLA ZOSTER BY PCR RESULT: NEGATIVE

## 2023-08-05 PROCEDURE — 11000001 HC ACUTE MED/SURG PRIVATE ROOM

## 2023-08-05 PROCEDURE — P9045 ALBUMIN (HUMAN), 5%, 250 ML: HCPCS | Mod: JZ,JG | Performed by: PATHOLOGY

## 2023-08-05 PROCEDURE — 36514 APHERESIS PLASMA: CPT | Mod: ,,, | Performed by: PATHOLOGY

## 2023-08-05 PROCEDURE — 36514 PR THER APHERESIS,PLASMA PHERESIS: ICD-10-PCS | Mod: ,,, | Performed by: PATHOLOGY

## 2023-08-05 PROCEDURE — 97112 NEUROMUSCULAR REEDUCATION: CPT

## 2023-08-05 PROCEDURE — 63600175 PHARM REV CODE 636 W HCPCS: Performed by: HOSPITALIST

## 2023-08-05 PROCEDURE — 63600175 PHARM REV CODE 636 W HCPCS: Mod: JZ,JG | Performed by: PATHOLOGY

## 2023-08-05 PROCEDURE — 25000003 PHARM REV CODE 250: Performed by: PATHOLOGY

## 2023-08-05 PROCEDURE — 99231 SBSQ HOSP IP/OBS SF/LOW 25: CPT | Mod: ,,, | Performed by: STUDENT IN AN ORGANIZED HEALTH CARE EDUCATION/TRAINING PROGRAM

## 2023-08-05 PROCEDURE — 99231 PR SUBSEQUENT HOSPITAL CARE,LEVL I: ICD-10-PCS | Mod: ,,, | Performed by: STUDENT IN AN ORGANIZED HEALTH CARE EDUCATION/TRAINING PROGRAM

## 2023-08-05 PROCEDURE — 36514 APHERESIS PLASMA: CPT

## 2023-08-05 RX ORDER — HEPARIN SODIUM 1000 [USP'U]/ML
2400 INJECTION, SOLUTION INTRAVENOUS; SUBCUTANEOUS ONCE
Status: COMPLETED | OUTPATIENT
Start: 2023-08-07 | End: 2023-08-07

## 2023-08-05 RX ORDER — CALCIUM GLUCONATE 20 MG/ML
2 INJECTION, SOLUTION INTRAVENOUS ONCE
Status: COMPLETED | OUTPATIENT
Start: 2023-08-07 | End: 2023-08-07

## 2023-08-05 RX ORDER — ALBUMIN HUMAN 50 G/1000ML
100 SOLUTION INTRAVENOUS ONCE
Status: COMPLETED | OUTPATIENT
Start: 2023-08-07 | End: 2023-08-07

## 2023-08-05 RX ADMIN — HEPARIN SODIUM 2400 UNITS: 1000 INJECTION, SOLUTION INTRAVENOUS; SUBCUTANEOUS at 02:08

## 2023-08-05 RX ADMIN — CALCIUM GLUCONATE 2 G: 20 INJECTION, SOLUTION INTRAVENOUS at 02:08

## 2023-08-05 RX ADMIN — ALBUMIN (HUMAN) 100 G: 12.5 SOLUTION INTRAVENOUS at 02:08

## 2023-08-05 NOTE — PLAN OF CARE
[id number]     Patient alert, nonverbal. Unable to express thoughts. Intermittently able to follow directions. Able to track visitors in room, unable to track upon command. Unable to use written communication. Continent of bladder and bowel. Lactating. Spouse reports BM last night. Total 1:1 feed. Bilateral hand contractures, limited ROM BUE. ROM BLE intact, spontaneous movements. Min/Mod assistance. Ambulated in hallway today.  Patient continues to breastfeed. Reassurance provided and questions answered.      TriCath right IJ sutured in place. Dressing dry/clean/intact, dated 08/03/23.   Day 2/5 PLEX. Tolerated well.     Safety measures maintained. Hourly rounding complete, personal belongings/call light within reach. Patient unable to make needs known. Family remains bedside at all times.          Problem: Adult Inpatient Plan of Care  Goal: Plan of Care Review  8/4/2023 2013 by Loretta Carrillo RN  Outcome: Ongoing, Progressing  8/4/2023 2013 by Loretta Carrillo RN  Outcome: Ongoing, Progressing  Goal: Patient-Specific Goal (Individualized)  8/4/2023 2013 by Loretta Carrillo RN  Outcome: Ongoing, Progressing  8/4/2023 2013 by Loretta Carrillo RN  Outcome: Ongoing, Progressing  Goal: Absence of Hospital-Acquired Illness or Injury  8/4/2023 2013 by Loretta Carrillo RN  Outcome: Ongoing, Progressing  8/4/2023 2013 by Loretta Carrillo RN  Outcome: Ongoing, Progressing  Goal: Optimal Comfort and Wellbeing  8/4/2023 2013 by Loretta Carrillo RN  Outcome: Ongoing, Progressing  8/4/2023 2013 by Loretta Carrillo RN  Outcome: Ongoing, Progressing  Goal: Readiness for Transition of Care  8/4/2023 2013 by Loretta Carrillo RN  Outcome: Ongoing, Progressing  8/4/2023 2013 by Loretta Carrillo RN  Outcome: Ongoing, Progressing     Problem: Fall Injury Risk  Goal: Absence of Fall and Fall-Related Injury  8/4/2023 2013 by Loretta Carrillo RN  Outcome: Ongoing, Progressing  8/4/2023 2013 by Loretta Carrillo RN  Outcome: Ongoing,  Progressing     Problem: Seizure, Active Management  Goal: Absence of Seizure/Seizure-Related Injury  8/4/2023 2013 by Loretta Carrillo RN  Outcome: Ongoing, Progressing  8/4/2023 2013 by Loretta Carrillo RN  Outcome: Ongoing, Progressing     Problem: Infection  Goal: Absence of Infection Signs and Symptoms  8/4/2023 2013 by Loretta Carrillo RN  Outcome: Ongoing, Progressing  8/4/2023 2013 by Loretta Carrillo RN  Outcome: Ongoing, Progressing     Problem: Skin Injury Risk Increased  Goal: Skin Health and Integrity  8/4/2023 2013 by Loretta Carrillo RN  Outcome: Ongoing, Progressing  8/4/2023 2013 by Loretta Carrillo RN  Outcome: Ongoing, Progressing

## 2023-08-05 NOTE — PROGRESS NOTES
Pt sitting up in chair upon arriving to room, Pt placed back in bed for procedure, mother is bedside. Pt is nonverbal but does not appear in distress and smiles often.  Apheresis tx #3 started at 1305 without difficulty. 2 liter plasma exchange completed via RIJ cath, cath patent, dressing clean, dry and intact. Replacement fluids 5% albumin.  2 grams of calcium gluconate given over duration of exchange.  Tx ended at 1416.  Cath flushed and Hep locked per protocol. Pt tolerated tx well.  Next tx 08/07/2023.  Mother at bedside for duration of exchange. Report given to DIOMEDES Arzate.

## 2023-08-05 NOTE — ASSESSMENT & PLAN NOTE
Patient opts to continue breastfeeding. Can call the [facility] [phone] if positive for prion disease.

## 2023-08-05 NOTE — SUBJECTIVE & OBJECTIVE
Interval History: Patient tolerating PLEX. No new issues.      Review of Systems   Constitutional:  Positive for activity change and fatigue.   Neurological:  Positive for speech difficulty. Negative for seizures.     Objective:     Vital Signs (Most Recent):  Temp: 97.9 °F (36.6 °C) (08/05/23 0800)  Pulse: 72 (08/05/23 0800)  Resp: 18 (08/05/23 0800)  BP: 103/67 (08/05/23 0800)  SpO2: 98 % (08/05/23 0800) Vital Signs (24h Range):  Temp:  [97 °F (36.1 °C)-98.6 °F (37 °C)] 97.9 °F (36.6 °C)  Pulse:  [72-81] 72  Resp:  [17-18] 18  SpO2:  [95 %-98 %] 98 %  BP: ()/(54-68) 103/67     Weight: 54 kg (119 lb)  Body mass index is 20.43 kg/m².  No intake or output data in the 24 hours ending 08/05/23 1513        Physical Exam  Constitutional:       General: She is not in acute distress.     Appearance: She is well-developed and normal weight. She is not diaphoretic.   Pulmonary:      Effort: Pulmonary effort is normal. No respiratory distress.   Skin:     General: Skin is warm and dry.      Coloration: Skin is not jaundiced or pale.   Neurological:      Mental Status: She is alert.      Cranial Nerves: No facial asymmetry.      Motor: No tremor or seizure activity.   Psychiatric:         Attention and Perception: Attention normal.         Speech: She is noncommunicative.         Behavior: Behavior is not aggressive. Behavior is cooperative.             Significant Labs: All pertinent labs within the past 24 hours have been reviewed.    Significant Imaging: I have reviewed all pertinent imaging results/findings within the past 24 hours.

## 2023-08-05 NOTE — PT/OT/SLP PROGRESS
"Occupational Therapy   Treatment    Name: Thania Carroll  MRN: [id number]  Admitting Diagnosis:  Neurodegenerative disorder  2 Days Post-Op    Recommendations:     Discharge Recommendations: other (see comments)  Discharge Equipment Recommendations:  shower chair  Barriers to discharge:  None    Assessment:     Thania Carroll is a 38 y.o. female with a medical diagnosis of Neurodegenerative disorder.  She presents with performance deficits affecting function are weakness, impaired endurance, impaired self care skills, impaired functional mobility.     Rehab Prognosis:  Good; patient would benefit from acute skilled OT services to address these deficits and reach maximum level of function.       Plan:     Patient to be seen 4 x/week to address the above listed problems via therapeutic exercises, neuromuscular re-education, cognitive retraining, self-care/home management, therapeutic activities  Plan of Care Expires: 08/23/23  Plan of Care Reviewed with: patient, spouse, mother    Subjective     Patient laughing appropriately 3x during the session.  At end of session, patient stood up to give therapist a hug good bye.  :  "Her hands are a lot looser now; thank you."    Pain/Comfort:  Pain Rating 1: 0/10  Pain Rating Post-Intervention 1: 0/10    Objective:     Communicated with: Nurse prior to session.  Patient found supine with telemetry upon OT entry to room.    General Precautions: Standard, aspiration, fall    Orthopedic Precautions:N/A  Braces: N/A  Respiratory Status: Room air     Occupational Performance:     Bed Mobility:    Patient completed Rolling/Turning to Left with  stand by assistance  Patient completed Rolling/Turning to Right with stand by assistance  Patient completed Supine to Sit with minimum assistance  Patient completed Sit to Supine with contact guard assistance     Functional Mobility/Transfers:  Patient completed Sit <> Stand Transfer with stand by assistance  with  no assistive device "   Patient completed Bed <> Chair Transfer using Stand Pivot technique with contact guard assistance with no assistive device    [facility] 6 Click ADL: 12    Treatment & Education:  Daily orientation provided.  P/AAROM performed bilateral UE/LEs one set x 10 rep in all planes of motion with stretches provided at end range; sustained stretch provided for wrist extension, MCP mobilization, finger extension.    Patient communicating via blinking and laughing; did not communicate verbally or with head nods.    Family  present during the session.    Patient left supine with all lines intact, call button in reach, and bed alarm on    GOALS:   Multidisciplinary Problems       Occupational Therapy Goals          Problem: Occupational Therapy    Goal Priority Disciplines Outcome Interventions   Occupational Therapy Goal     OT, PT/OT Ongoing, Progressing    Description: Goals set 8/5 to be addressed for 14 days with expiration date, 8/19:  Patient will increase functional independence with ADLs by performing:    Patient will demonstrate rolling to the right with modified independence.  Not met   Patient will demonstrate rolling to the left with modified independence.   Not met  Patient will demonstrate supine -sit with modified independence.   Not met  Patient will demonstrate stand pivot transfers with modified independence.   Not met  Patient will demonstrate grooming while standing with min assist.   Not met  Patient will demonstrate upper body dressing with min assist while seated EOB.   Not met  Patient will demonstrate lower body dressing with min assist while seated EOB.   Not met  Patient will demonstrate toileting with min assist.   Not met  Patient's family / caregiver will demonstrate independence and safety with assisting patient with self-care skills and functional mobility.     Not met  Patient's family / caregiver will demonstrate independence with providing ROM and changes in bed positioning.   Not met                        Time Tracking:     OT Date of Treatment: 08/05/23  OT Start Time: 0846  OT Stop Time: 0910  OT Total Time (min): 24 min    Billable Minutes:Neuromuscular Re-education 24    OT/MIA: OT          8/5/2023

## 2023-08-05 NOTE — PLAN OF CARE
Goals remain appropriate.  Problem: Occupational Therapy  Goal: Occupational Therapy Goal  Description: Goals to be met by: 8/9/23     Patient will increase functional independence with ADLs by performing:    Feeding with Moderate Assistance.  UE Dressing with Moderate Assistance.  LE Dressing with Maximum Assistance.  Grooming while standing at sink with Moderate Assistance.  Toileting from toilet with Moderate Assistance for hygiene and clothing management.   Toilet transfer to toilet with Contact Guard Assistance.  Upper extremity exercise program (PROM of BUE) x20 reps, with independence.    Outcome: Ongoing, Progressing

## 2023-08-06 LAB
M TB CMPLX DNA SPEC QL NAA+PROBE: NEGATIVE
SPECIMEN SOURCE: NORMAL

## 2023-08-06 PROCEDURE — 97112 NEUROMUSCULAR REEDUCATION: CPT

## 2023-08-06 PROCEDURE — 63600175 PHARM REV CODE 636 W HCPCS: Performed by: HOSPITALIST

## 2023-08-06 PROCEDURE — 99231 SBSQ HOSP IP/OBS SF/LOW 25: CPT | Mod: ,,, | Performed by: STUDENT IN AN ORGANIZED HEALTH CARE EDUCATION/TRAINING PROGRAM

## 2023-08-06 PROCEDURE — 99231 PR SUBSEQUENT HOSPITAL CARE,LEVL I: ICD-10-PCS | Mod: ,,, | Performed by: STUDENT IN AN ORGANIZED HEALTH CARE EDUCATION/TRAINING PROGRAM

## 2023-08-06 PROCEDURE — 99233 PR SUBSEQUENT HOSPITAL CARE,LEVL III: ICD-10-PCS | Mod: ,,, | Performed by: PSYCHIATRY & NEUROLOGY

## 2023-08-06 PROCEDURE — 99233 SBSQ HOSP IP/OBS HIGH 50: CPT | Mod: ,,, | Performed by: PSYCHIATRY & NEUROLOGY

## 2023-08-06 PROCEDURE — 97535 SELF CARE MNGMENT TRAINING: CPT

## 2023-08-06 PROCEDURE — 11000001 HC ACUTE MED/SURG PRIVATE ROOM

## 2023-08-06 NOTE — SUBJECTIVE & OBJECTIVE
Interval History: Patient tolerating PLEX. Some improvement seen in physical exam.      Review of Systems   Constitutional:  Positive for activity change and fatigue.   Neurological:  Positive for speech difficulty. Negative for seizures.     Objective:     Vital Signs (Most Recent):  Temp: 97.4 °F (36.3 °C) (08/06/23 1200)  Pulse: 72 (08/06/23 1200)  Resp: 16 (08/06/23 1200)  BP: 109/72 (08/06/23 1200)  SpO2: 98 % (08/06/23 1200) Vital Signs (24h Range):  Temp:  [97.4 °F (36.3 °C)-98.5 °F (36.9 °C)] 97.4 °F (36.3 °C)  Pulse:  [70-93] 72  Resp:  [14-20] 16  SpO2:  [97 %-99 %] 98 %  BP: ()/(56-76) 109/72     Weight: 53.9 kg (118 lb 13.3 oz)  Body mass index is 20.4 kg/m².    Intake/Output Summary (Last 24 hours) at 8/6/2023 1643  Last data filed at 8/6/2023 0400  Gross per 24 hour   Intake 500 ml   Output 0 ml   Net 500 ml           Physical Exam  Constitutional:       General: She is not in acute distress.     Appearance: She is well-developed and normal weight. She is not diaphoretic.   Pulmonary:      Effort: Pulmonary effort is normal. No respiratory distress.   Skin:     General: Skin is warm and dry.      Coloration: Skin is not jaundiced or pale.   Neurological:      Mental Status: She is alert.      Cranial Nerves: No facial asymmetry.      Motor: No tremor or seizure activity.   Psychiatric:         Attention and Perception: Attention normal.         Speech: She is noncommunicative.         Behavior: Behavior is not aggressive. Behavior is cooperative.             Significant Labs: All pertinent labs within the past 24 hours have been reviewed.    Significant Imaging: I have reviewed all pertinent imaging results/findings within the past 24 hours.

## 2023-08-06 NOTE — SUBJECTIVE & OBJECTIVE
Review of Systems   Unable to perform ROS: Mental status change     Objective:     Vital Signs (Most Recent):  Temp: 97.5 °F (36.4 °C) (08/06/23 0335)  Pulse: 70 (08/06/23 0335)  Resp: 14 (08/06/23 0335)  BP: (!) 93/56 (08/06/23 0335)  SpO2: 97 % (08/06/23 0335) Vital Signs (24h Range):  Temp:  [97.3 °F (36.3 °C)-98.7 °F (37.1 °C)] 97.5 °F (36.4 °C)  Pulse:  [70-93] 70  Resp:  [14-20] 14  SpO2:  [97 %-99 %] 97 %  BP: ()/(56-78) 93/56     Weight: 53.9 kg (118 lb 13.3 oz)  Body mass index is 20.4 kg/m².     Physical Exam   Neurological Exam:  MENTAL STATUS  Level of consciousness: alert  Orientation: unable to answer orientation questions  Moves all four extremities spontaneously.  Blinks to threat.  Occasionally verbalizes.  Able to follow commands, including blinking, getting up to walk.  No startle myoclonus.    CRANIAL NERVES  CN III, IV, VI: PERRL, EOMI  CN V: facial sensation intact, muscles of mastication intact  CN VII: hypomimia  CN IX, X: speech, when present, is soft and monotone    MOTOR EXAM  Muscle bulk: generally diminished, though this is most evident in the bilateral upper extremities, with profound palmar atrophy  Muscle tone: rigidity, most prominent in the bilateral upper extremities (distal >proximal); no spasticity    Strength - Upper Extremities   Arm abduction Elbow flexion Elbow extension Wrist flexion Wrist extension Finger abduction   Right 4/5 4/5 4/5 4/5 4/5 3/5   Left 4/5 4/5 4/5 4/5 4/5 3/5     Strength - Lower Extremities   Hip flexion Knee flexion Knee extension Dorsiflexion Plantarflexion   Right 5/5 5/5 5/5 5/5 5/5   Left 5/5 5/5 5/5 5/5 5/5     REFLEXES   Biceps Triceps Brachioradialis Patellar Achilles   Right +2 +2 +2 +2 +2   Left +2 +2 +2 +2 +2     Planter reflex: down-going bilaterally    SENSORY EXAM  Withdraws to pain in all four extremities. Assessment of specific modalities (vibration, pin-prick, temperature, proprioception) is limited due to mental  status.    COORDINATION  Tremor: none  Gait: slow gait with reduced arm swing and slow turning.       Significant Labs: All pertinent lab results from the past 24 hours have been reviewed.    Significant Imaging:     MRI brain 8/6/23:        I have reviewed all pertinent imaging results/findings within the past 24 hours.

## 2023-08-06 NOTE — PLAN OF CARE
Problem: Adult Inpatient Plan of Care  Goal: Plan of Care Review  Outcome: Ongoing, Progressing     Problem: Adult Inpatient Plan of Care  Goal: Optimal Comfort and Wellbeing  Outcome: Ongoing, Progressing     Problem: Adult Inpatient Plan of Care  Goal: Readiness for Transition of Care  Outcome: Ongoing, Progressing     Problem: Fall Injury Risk  Goal: Absence of Fall and Fall-Related Injury  Outcome: Ongoing, Progressing     Problem: Seizure, Active Management  Goal: Absence of Seizure/Seizure-Related Injury  Outcome: Ongoing, Progressing     Problem: Infection  Goal: Absence of Infection Signs and Symptoms  Outcome: Ongoing, Progressing   Patient is alert but remains non verbal, can answer basic yes/no questions by blinking her eyes. Laughs at inappropriate times. No s/sx of distress or c/o pain noted.  and mother remain at the bedside. Pt continues to breast feed and holds bay intermittently. Educated on the importance of her continuing to breast feed and hold baby so she is able to bond with her.  voiced his understanding.  doing ROM to BLE's, hands remain contracted and winces with ROM. Can ambulate to bathroom with assistance. Declined SCD's. Flow sheets completed see for assessments.

## 2023-08-06 NOTE — PROGRESS NOTES
[facility] Medicine  Progress Note    Patient Name: Thania Carroll  MRN: [id number]  Patient Class: IP- Inpatient   Admission Date: 2023  Length of Stay: 12 days  Attending Physician: Meredith Mitchell MD  Primary Care Provider: Joanna Primary Doctor        Subjective:     Principal Problem:Neurodegenerative disorder        HPI:  Thania Carroll is a 38 year old white woman,  2 para 2, with juvenile rheumatoid arthritis, catatonia. She lives in [place], [place]. She is . She has two children.   She presented to [facility] on 2023. She had been having progressive neurological deterioration over the past 8 months with weakness, speech changes, slowing of mentation, joint contractures of hands. She can ambulate with assistance and was cognizant of where she wanted to go in the house as her family assisted her. She was able to communicate only through yes-no questions. Her symptoms seemed to have worsened with her last pregnancy in 2022, though symptoms started prior to it. She first noticed she had trouble using her hands when holding her baby in May 2022 and told her mother about it in  or July. Her family noticed that she started slowing down in early . She took longer to wash her hair, had trouble orienting things, had trouble holding onto things and losing her , then noticed she was moving more slowly in general. She underwent two neurologic workups, one at [facility] ([facility]) and the other in [place] with neurologist Dr. Franco. She was diagnosed with catatonia at [facility]. She was referred to Psychiatry. Dr. Franco performed lumbar puncture. She was hospitalized in 2023 for further workup. Dr. Franco suspected an autoimmune disorder. She underwent an extended electroencephalogram at home that was normal. Her hands started to contract. She started having echolalia  (meaningles repetition) in September/October as well as palilalia (involuntary repeating words). She had staring episodes that lasted a minute or two. She noted more muscle atrophy in November 2022. She delivered in May 2023 and was still breastfeeding an eight week old baby. She had no speech for the last 3 months. Had had several falls over the past week. Her last fall was 3 days ago. She started having emotional lability in October/November 2022.  She was seen at [facility] clinic on 7/24/2023. The only medication she had ever been on was prednisone in January with no improvement, methylprednisolone with no response, and methotrexate. She was seen by Nohemy Brady PA-C and Dr. Harshal Lyons, who recommended lumbar puncture with encephalopathy panel to send to [facility] and movement panel to rule out NMDAr encephalitis and 14-3-3, total tau RT-QulC, neurofilament light chain, inpatient IVIG and methylprednisolone, electroencephalogram to evaluate for delta brush, electromyelogram. Her father noticed intermittent choking spells when eating.   Clinic and emergency department evaluations:  Cbc. Cmp. Cpk, lipid panel- nl  Heavy metal screen 6/2023 - nl  TFTs -nl  Movement Disorder, Autoimmune pannel, cerruloplasm, negative  NA 1:160  UPT negative  Trep Ab bucky  U tox neg  Vancleve's D - neg  Lyme - neg  Hereditary Parkinson's - neg  CT head 1/2023  MRI head - done 6/15.23, some atrophy out of proportion for age, particularly in bilateral caudate and putamen noted by  Nohemy Brady PA-C.    She was admitted to Hospital Medicine Team N.      Overview/Hospital Course:  CT chest abdomen pelvis was done to assess for mass, particularly ovarian teratoma, which may be associated with autoimmune encephalitis (especially NMDA receptor encephalitis). It showed bilateral ground glass lung attenuation but did not show an ovarian tumor, so transvaginal ultrasound was done and also  showed no ovarian mass. She was put on continuous EEG to assess for abnormalities seen in NMDA receptor encephalitis (ie delta brush) vs abnormalities seen in other conditions such as Creutzfeld-Tripp Disease. Infection Control was consulted due to testing for prion disease. Fluoroscopy was consulted for lumbar puncture with opening pressure and CSF studies ordered: cell count and differential, protein, glucose, cytology and flow cytometry, gram stain and culture, autoimmune encephalopathy panel (ENC[facility] [place] sendout panel), RT-quIC ([facility] prion center sendout lab). Neurology planned plasma exchange (PLEX) following lumbar puncture, and electromyography, likely outpatient. Lumbar puncture was done on 7/28/2023. CSF was used for labs ordered by the physician assistant she had seen in Neurology clinic rather than the labs ordered by the inpatient consult service, which made another lumbar puncture necessary before treatment. Lumbar puncture done, central line placed and PLEX started on 8/3/23.      Interval History: Patient tolerating PLEX. Some improvement seen in physical exam.      Review of Systems   Constitutional:  Positive for activity change and fatigue.   Neurological:  Positive for speech difficulty. Negative for seizures.     Objective:     Vital Signs (Most Recent):  Temp: 97.4 °F (36.3 °C) (08/06/23 1200)  Pulse: 72 (08/06/23 1200)  Resp: 16 (08/06/23 1200)  BP: 109/72 (08/06/23 1200)  SpO2: 98 % (08/06/23 1200) Vital Signs (24h Range):  Temp:  [97.4 °F (36.3 °C)-98.5 °F (36.9 °C)] 97.4 °F (36.3 °C)  Pulse:  [70-93] 72  Resp:  [14-20] 16  SpO2:  [97 %-99 %] 98 %  BP: ()/(56-76) 109/72     Weight: 53.9 kg (118 lb 13.3 oz)  Body mass index is 20.4 kg/m².    Intake/Output Summary (Last 24 hours) at 8/6/2023 1643  Last data filed at 8/6/2023 0400  Gross per 24 hour   Intake 500 ml   Output 0 ml   Net 500 ml           Physical Exam  Constitutional:       General: She is not in acute distress.      Appearance: She is well-developed and normal weight. She is not diaphoretic.   Pulmonary:      Effort: Pulmonary effort is normal. No respiratory distress.   Skin:     General: Skin is warm and dry.      Coloration: Skin is not jaundiced or pale.   Neurological:      Mental Status: She is alert.      Cranial Nerves: No facial asymmetry.      Motor: No tremor or seizure activity.   Psychiatric:         Attention and Perception: Attention normal.         Speech: She is noncommunicative.         Behavior: Behavior is not aggressive. Behavior is cooperative.             Significant Labs: All pertinent labs within the past 24 hours have been reviewed.    Significant Imaging: I have reviewed all pertinent imaging results/findings within the past 24 hours.      Assessment/Plan:      * Neurodegenerative disorder  Neurology following. CT and ultrasound showed no ovarian tumor. LP done but will need another to obtain more labs. Repeat LP done on 8/2/23. Anesthesia contacted for central line placement 8/3. PLEX started on 8/3    Bradykinesia        Ground glass opacity present on imaging of lung  No fever or leukocytosis. No hypoxia. Her cough is weak but she has not been coughing. Probably aspirations. Can get a follow-up chest X-ray.     Oropharyngeal dysphagia  SLP. Dental soft diet. Family wanted to defer modified barium swallow study.    Breastfeeding (infant)  Patient opts to continue breastfeeding. Can call the [facility] [phone] if positive for prion disease.    Juvenile rheumatoid arthritis  Chronic, stable.  GISEL :160, autoimmune panel is negative  Rheu Factor Neg        VTE Risk Mitigation (From admission, onward)         Ordered     heparin (porcine) injection 2,400 Units  Once         08/05/23 1501     enoxaparin injection 40 mg  Daily         07/25/23 1802     IP VTE HIGH RISK PATIENT  Once         07/25/23 1802     Place sequential compression device  Until discontinued         07/25/23 1802                 Discharge Planning   ALANNA: 8/9/2023     Code Status: Full Code   Is the patient medically ready for discharge?: No    Reason for patient still in hospital (select all that apply): Treatment  Discharge Plan A: Home with family, Home Health   Discharge Delays: None known at this time              Meredith Mitchell MD  Department of Hospital Medicine   [facility] - Neurosurgery ([facility])

## 2023-08-06 NOTE — ASSESSMENT & PLAN NOTE
This is a 38 year-old female who presents as a direct admission from movement disorders clinic for work and treatment of behavioral changes accompanied by parkinsonism and upper extremity weakness/atrophy. This has been progressively worsening since at least August 2022. Initial changes were behavioral, with reduced motivation, slowness in thought/speech, with motor symptoms (bradykinesia, subsequent upper extremity weakness and atrophy) being evident afterwards. She has received an extensive outpatient workup for inherited neurologic disease, autoimmune disease, which aside from a positive GISEL, has been unrevealing. Previously diagnosed with catatonia. MRI brain in May 2023 reviewed by myself and Dr. Camara: shows bilateral atrophy of caudate and putamen, as well as bilateral frontal and temporal lobes. 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    -CT chest, abdomen, pelvis and transvaginal ultrasound both negative for ovarian mass  -EEG is without abnormalities consistent w/CJD or NMDA receptor encephalitis    Plan  ->CSF studies: cell count and differential, protein, glucose, cytology and flow cytometry, gram stain and culture, autoimmune encephalopathy panel (ENC2 [place] sendout panel), RT-quIC ([facility]): pending/in process. SP 2 LPs. Some of these results are on hold given previous LP testing for CJD  -> Discussed with apheresis team and primary team. Will plan for plasma exchange (PLEX). Started on 8/3 - Subjective improvement so far, per family she is talking more and seems more aware of her surroundings, interacts more.   - Neurology will continue to follow. Please call if any questions or concerns.

## 2023-08-06 NOTE — NURSING
Initial Rounds: Patient is awake and alert sitting up in bedside chair with legs elevated. Chair alarm in place,  doing ROM to lower extremities. Remains non verbal but can blink for yes/no. No s/sx of distress or c/o pain at this time.

## 2023-08-06 NOTE — PROCEDURES
[facility] ([facility])  Transfusion Medicine  Procedure Note    SUMMARY   Therapeutic Plasma Exchange (Apheresis)    Date/Time: 8/6/2023 8:12 AM    Performed by: Lenin Diaz MD  Authorized by: Lenin Diaz MD        Date of Procedure: 8/5/23     Procedure: Plasma Exchange    Provider: Nara Diaz MD     Assisting Provider: None    Pre-Procedure Diagnosis: Neurodegenerative disorder    Post-Procedure Diagnosis: Neurodegenerative disorder    Follow-up Assessment: Very difficult case of a 39 y/o female with a complex neurological disorder of unknown etiology, with autoimmune encephalitis as a possible choice.  Neurology would like to consider a series of Plasma Exchanges.  We concur and will hopefully start tomorrow after a central line is placed.  Plan is 5 procedures over 5-7 days.  Avoid any ACE Inhibitors during the next week.       Today's PLEX, #3 of 5, completed without complications and was well tolerated.  Next PLEX planned for 08/07/23.    Pertinent Laboratory Data: Complete Blood Count:   Lab Results   Component Value Date    HGB 13.8 07/28/2023    HCT 41.8 07/28/2023     07/28/2023    WBC 5.85 07/28/2023     Basic Metabolic Panel:   Lab Results   Component Value Date     07/28/2023    K 3.8 07/28/2023     07/28/2023    CO2 22 (L) 07/28/2023    GLU 86 07/28/2023    BUN 17 07/28/2023    CREATININE 0.8 07/28/2023    CALCIUM 9.7 07/28/2023    ANIONGAP 10 07/28/2023    ESTGFRAFRICA 125 01/04/2023       Pertinent Medications: None contraindicated for PLEX    Review of patient's allergies indicates:  No Known Allergies    Anesthesia: None     Technical Procedures Used: Plasma Exchange: Volume exchanged - 2.0 Liters; Replacement fluid - Albumin; Number of procedures 3; Date of next procedure 8/7/23.    Description of the Findings of the Procedure:     Please see Apheresis Nurse flowsheet for details.    The patient was evaluated and all clinical and laboratory data  relevant to the treatment was reviewed, and a decision was made to proceed with the Apheresis procedure.    I was available to the clinical staff throughout the procedure.    Significant Surgical Tasks Conducted by the Assistant(s): Not applicable    Complications: None    Estimated Blood Loss (EBL): None    Implants: None     Specimens: None

## 2023-08-06 NOTE — PLAN OF CARE
Goals remain appropriate.  Problem: Occupational Therapy  Goal: Occupational Therapy Goal  Description: Goals set 8/5 to be addressed for 14 days with expiration date, 8/19:  Patient will increase functional independence with ADLs by performing:    Patient will demonstrate rolling to the right with modified independence.  Not met   Patient will demonstrate rolling to the left with modified independence.   Not met  Patient will demonstrate supine -sit with modified independence.   Not met  Patient will demonstrate stand pivot transfers with modified independence.   Not met  Patient will demonstrate grooming while standing with min assist.   Not met  Patient will demonstrate upper body dressing with min assist while seated EOB.   Not met  Patient will demonstrate lower body dressing with min assist while seated EOB.   Not met  Patient will demonstrate toileting with min assist.   Not met  Patient's family / caregiver will demonstrate independence and safety with assisting patient with self-care skills and functional mobility.     Not met  Patient's family / caregiver will demonstrate independence with providing ROM and changes in bed positioning.   Not met  Outcome: Ongoing, Progressing

## 2023-08-06 NOTE — PROGRESS NOTES
"[facility] ([facility])  Neurology  Progress Note    Patient Name: Thania Carroll  MRN: [id number]  Admission Date: 7/25/2023  Hospital Length of Stay: 12 days  Code Status: Full Code   Attending Provider: Meredith Mitchell MD  Primary Care Physician: Joanna, Primary Doctor   Principal Problem:Neurodegenerative disorder    HPI:   Ms. Carroll is a 38 year-old female with a history of juvenile rheumatoid arthritis, who presents as a direct admission from movement disorders clinic for inpatient lumbar puncture for work-up of progressively worsening behavioral changes and parkinsonism. History is obtained per discussion with movement disorders clinic staff (Nohemy Brady), chart review, and from the patient's father who is at bedside. The patient has been experiencing the above changes since 2022, and per chart review and discussion with family it appears that there was significant worsening around August and then November of that year. Initial symptoms are described by family as a "slowing of her thinking and motivation." She was slow to respond to others, less communicative, and was having more difficulties with ADLs, including taking care of her baby. As this worsened, additional motor symptoms became evident. Bradykinesia was initially predominant, affecting first the bilateral upper extremities, and then the lower extremities particularly in regard to slowing of gait and turning progressing to gait instability with falls. The distal upper extremities also became progressively weaker, leading to difficulty with motor tasks and further impairment of daily activities, and subsequent palmar muscle atrophy and bilateral "clawing" of the hands. She was become markedly less communicative over the last three or so months per family, and per chart review has been exhibiting both echolalia and palilalia since September/October of last year. Additionally has suffered from dysphagia to both solids and liquids. All of " this has progressed to the point of her being unable to work, and ultimately unable to care for herself.    She originally presented to OS for bradykinesia and apathy. Received a diagnosis of catatonia and was referred to psychiatry. Seen by neurology in [facility] in 11/2022 and was again diagnosed with catatonia. Additionally seen by neurology in [place], and there was concern to autoimmune disorder, however was reportedly lost to follow-up. Currently follows in the [facility] movement disorders clinic. She has undergone an extensive outpatient work-up thus far, including testing for hakan's disease, hereditary forms of parkinson's disease, frontotemporal dementia, NMDA receptor encephalitis, autoimmune disease (GISEL, dsDNA, smith), Varun's disease, Sjorgen's: all of which appears to have been negative, aside from positive GISEL. Additionally had peripheral blood smear done, which was without acanthocytosis. MRI brain in 2023 demonstrates frontal and temporal lobe atrophy, as well as bilateral caudate and putamen atrophy. She has previously received 5 day course of pulse-dose steroids without any improvement. 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 Admitted to hospital medicine with neurology consultation.      Review of Systems   Unable to perform ROS: Mental status change     Objective:     Vital Signs (Most Recent):  Temp: 97.5 °F (36.4 °C) (08/06/23 0335)  Pulse: 70 (08/06/23 0335)  Resp: 14 (08/06/23 0335)  BP: (!) 93/56 (08/06/23 0335)  SpO2: 97 % (08/06/23 0335) Vital Signs (24h Range):  Temp:  [97.3 °F (36.3 °C)-98.7 °F (37.1 °C)] 97.5 °F (36.4 °C)  Pulse:  [70-93] 70  Resp:  [14-20] 14  SpO2:  [97 %-99 %] 97 %  BP: ()/(56-78) 93/56     Weight: 53.9 kg (118 lb 13.3 oz)  Body mass index is 20.4 kg/m².     Physical Exam   Neurological  Exam:  MENTAL STATUS  Level of consciousness: alert  Orientation: unable to answer orientation questions  Moves all four extremities spontaneously.  Blinks to threat.  Occasionally verbalizes.  Able to follow commands, including blinking, getting up to walk.  No startle myoclonus.    CRANIAL NERVES  CN III, IV, VI: PERRL, EOMI  CN V: facial sensation intact, muscles of mastication intact  CN VII: hypomimia  CN IX, X: speech, when present, is soft and monotone    MOTOR EXAM  Muscle bulk: generally diminished, though this is most evident in the bilateral upper extremities, with profound palmar atrophy  Muscle tone: rigidity, most prominent in the bilateral upper extremities (distal >proximal); no spasticity    Strength - Upper Extremities   Arm abduction Elbow flexion Elbow extension Wrist flexion Wrist extension Finger abduction   Right 4/5 4/5 4/5 4/5 4/5 3/5   Left 4/5 4/5 4/5 4/5 4/5 3/5     Strength - Lower Extremities   Hip flexion Knee flexion Knee extension Dorsiflexion Plantarflexion   Right 5/5 5/5 5/5 5/5 5/5   Left 5/5 5/5 5/5 5/5 5/5     REFLEXES   Biceps Triceps Brachioradialis Patellar Achilles   Right +2 +2 +2 +2 +2   Left +2 +2 +2 +2 +2     Planter reflex: down-going bilaterally    SENSORY EXAM  Withdraws to pain in all four extremities. Assessment of specific modalities (vibration, pin-prick, temperature, proprioception) is limited due to mental status.    COORDINATION  Tremor: none  Gait: slow gait with reduced arm swing and slow turning.       Significant Labs: All pertinent lab results from the past 24 hours have been reviewed.    Significant Imaging:     MRI brain 8/6/23:        I have reviewed all pertinent imaging results/findings within the past 24 hours.    Assessment and Plan:     * Neurodegenerative disorder  This is a 38 year-old female who presents as a direct admission from movement disorders clinic for work and treatment of behavioral changes accompanied by parkinsonism and upper  extremity weakness/atrophy. This has been progressively worsening since at least August 2022. Initial changes were behavioral, with reduced motivation, slowness in thought/speech, with motor symptoms (bradykinesia, subsequent upper extremity weakness and atrophy) being evident afterwards. She has received an extensive outpatient workup for inherited neurologic disease, autoimmune disease, which aside from a positive GISEL, has been unrevealing. Previously diagnosed with catatonia. MRI brain in May 2023 reviewed by myself and Dr. Camara: shows bilateral atrophy of caudate and putamen, as well as bilateral frontal and temporal lobes. 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    -CT chest, abdomen, pelvis and transvaginal ultrasound both negative for ovarian mass  -EEG is without abnormalities consistent w/CJD or NMDA receptor encephalitis    Plan  ->CSF studies: cell count and differential, protein, glucose, cytology and flow cytometry, gram stain and culture, autoimmune encephalopathy panel (ENC2 [place] sendout panel), RT-quIC ([facility] prion center sendout lab): pending/in process. SP 2 LPs. Some of these results are on hold given previous LP testing for CJD  -> Discussed with apheresis team and primary team. Will plan for plasma exchange (PLEX). Started on 8/3 - Subjective improvement so far, per family she is talking more and seems more aware of her surroundings, interacts more.   - Neurology will continue to follow. Please call if any questions or concerns.         VTE Risk Mitigation (From admission, onward)           Ordered     heparin (porcine) injection 2,400 Units  Once         08/05/23 1501     enoxaparin injection 40 mg  Daily         07/25/23 1802     IP VTE HIGH RISK PATIENT  Once         07/25/23 1802     Place sequential compression device  Until discontinued         07/25/23 1802                     Bryson Caballero MD  Neurology  [facility] ([facility])

## 2023-08-06 NOTE — ASSESSMENT & PLAN NOTE
Patient opts to continue breastfeeding. Can call the [facility] [phone] if positive for prion disease.

## 2023-08-06 NOTE — PT/OT/SLP PROGRESS
"Occupational Therapy   Treatment    Name: Thania Carroll  MRN: [id number]  Admitting Diagnosis:  Neurodegenerative disorder  3 Days Post-Op    Recommendations:     Discharge Recommendations: other (see comments)  Discharge Equipment Recommendations:  shower chair  Barriers to discharge:  None    Assessment:     Thania Carroll is a 38 y.o. female with a medical diagnosis of Neurodegenerative disorder.  She presents with performance deficits affecting function are weakness, impaired self care skills, decreased coordination, decreased upper extremity function, impaired functional mobility, decreased lower extremity function, impaired fine motor, impaired coordination, impaired cognition, impaired endurance, impaired balance.     Rehab Prognosis:  Good; patient would benefit from acute skilled OT services to address these deficits and reach maximum level of function.       Plan:     Patient to be seen 4 x/week to address the above listed problems via self-care/home management, therapeutic activities, therapeutic exercises, neuromuscular re-education, cognitive retraining  Plan of Care Expires: 08/23/23  Plan of Care Reviewed with: patient, spouse    Subjective     Patient:  "Bye"--at end of the session  :  "Look at the picture I took this morning.  She woke up with her hands open.  I think she benefited from the work you did with her."    Pain/Comfort:  Pain Rating 1: 0/10  Pain Rating Post-Intervention 1: 0/10    Objective:     Communicated with: Nurse prior to session.  Patient found seated in bedside chair with telemetry, central line upon OT entry to room.    General Precautions: Standard, aspiration, fall    Orthopedic Precautions:N/A  Braces: N/A  Respiratory Status: Room air     Occupational Performance:     Functional Mobility/Transfers:  Patient completed Sit <> Stand Transfer with contact guard assistance  with  no assistive device   Patient completed Bed <> Chair Transfer using Stand Pivot technique with " contact guard assistance with no assistive device    Activities of Daily Living:  Upper Body Dressing: moderate assistance with pullover shirt  Lower Body Dressing: maximal assistance while seated EOB    [facility] 6 Click ADL: 12    Treatment & Education:  Daily orientation provided.  P/AAROM performed bilateral UE/LEs one set x 10 rep in all planes of motion with stretches provided at end range; sustained stretch provided for wrist extension, MCP mobilization, finger extension.    Family  present during the session.    Patient left up in chair with all lines intact, call button in reach, bed alarm on, and  present    GOALS:   Multidisciplinary Problems       Occupational Therapy Goals          Problem: Occupational Therapy    Goal Priority Disciplines Outcome Interventions   Occupational Therapy Goal     OT, PT/OT Ongoing, Progressing    Description: Goals set 8/5 to be addressed for 14 days with expiration date, 8/19:  Patient will increase functional independence with ADLs by performing:    Patient will demonstrate rolling to the right with modified independence.  Not met   Patient will demonstrate rolling to the left with modified independence.   Not met  Patient will demonstrate supine -sit with modified independence.   Not met  Patient will demonstrate stand pivot transfers with modified independence.   Not met  Patient will demonstrate grooming while standing with min assist.   Not met  Patient will demonstrate upper body dressing with min assist while seated EOB.   Not met  Patient will demonstrate lower body dressing with min assist while seated EOB.   Not met  Patient will demonstrate toileting with min assist.   Not met  Patient's family / caregiver will demonstrate independence and safety with assisting patient with self-care skills and functional mobility.     Not met  Patient's family / caregiver will demonstrate independence with providing ROM and changes in bed positioning.   Not met                        Time Tracking:     OT Date of Treatment: 08/06/23  OT Start Time: 0840  OT Stop Time: 0905  OT Total Time (min): 25 min    Billable Minutes:Self Care/Home Management 10  Neuromuscular Re-education 15    OT/MIA: OT          8/6/2023

## 2023-08-07 LAB
ANION GAP SERPL CALC-SCNC: 11 MMOL/L (ref 8–16)
BASOPHILS # BLD AUTO: 0.04 K/UL (ref 0–0.2)
BASOPHILS NFR BLD: 0.5 % (ref 0–1.9)
BUN SERPL-MCNC: 19 MG/DL (ref 6–20)
CALCIUM SERPL-MCNC: 9.5 MG/DL (ref 8.7–10.5)
CHLORIDE SERPL-SCNC: 107 MMOL/L (ref 95–110)
CO2 SERPL-SCNC: 21 MMOL/L (ref 23–29)
CREAT SERPL-MCNC: 0.6 MG/DL (ref 0.5–1.4)
DIFFERENTIAL METHOD: NORMAL
EOSINOPHIL # BLD AUTO: 0.1 K/UL (ref 0–0.5)
EOSINOPHIL NFR BLD: 1.6 % (ref 0–8)
ERYTHROCYTE [DISTWIDTH] IN BLOOD BY AUTOMATED COUNT: 14.4 % (ref 11.5–14.5)
EST. GFR  (NO RACE VARIABLE): >60 ML/MIN/1.73 M^2
FINAL PATHOLOGIC DIAGNOSIS: NORMAL
GLUCOSE SERPL-MCNC: 91 MG/DL (ref 70–110)
HCT VFR BLD AUTO: 38.1 % (ref 37–48.5)
HGB BLD-MCNC: 12.9 G/DL (ref 12–16)
IMM GRANULOCYTES # BLD AUTO: 0.02 K/UL (ref 0–0.04)
IMM GRANULOCYTES NFR BLD AUTO: 0.3 % (ref 0–0.5)
LYMPHOCYTES # BLD AUTO: 1.8 K/UL (ref 1–4.8)
LYMPHOCYTES NFR BLD: 23.7 % (ref 18–48)
Lab: NORMAL
MCH RBC QN AUTO: 30.9 PG (ref 27–31)
MCHC RBC AUTO-ENTMCNC: 33.9 G/DL (ref 32–36)
MCV RBC AUTO: 91 FL (ref 82–98)
MONOCYTES # BLD AUTO: 0.8 K/UL (ref 0.3–1)
MONOCYTES NFR BLD: 10.9 % (ref 4–15)
NEUTROPHILS # BLD AUTO: 4.8 K/UL (ref 1.8–7.7)
NEUTROPHILS NFR BLD: 63 % (ref 38–73)
NRBC BLD-RTO: 0 /100 WBC
PLATELET # BLD AUTO: 204 K/UL (ref 150–450)
PMV BLD AUTO: 9.8 FL (ref 9.2–12.9)
POTASSIUM SERPL-SCNC: 4 MMOL/L (ref 3.5–5.1)
RBC # BLD AUTO: 4.18 M/UL (ref 4–5.4)
SODIUM SERPL-SCNC: 139 MMOL/L (ref 136–145)
WBC # BLD AUTO: 7.55 K/UL (ref 3.9–12.7)

## 2023-08-07 PROCEDURE — P9045 ALBUMIN (HUMAN), 5%, 250 ML: HCPCS | Mod: JZ,JG | Performed by: PATHOLOGY

## 2023-08-07 PROCEDURE — 85025 COMPLETE CBC W/AUTO DIFF WBC: CPT | Performed by: STUDENT IN AN ORGANIZED HEALTH CARE EDUCATION/TRAINING PROGRAM

## 2023-08-07 PROCEDURE — 80048 BASIC METABOLIC PNL TOTAL CA: CPT | Performed by: STUDENT IN AN ORGANIZED HEALTH CARE EDUCATION/TRAINING PROGRAM

## 2023-08-07 PROCEDURE — 99232 PR SUBSEQUENT HOSPITAL CARE,LEVL II: ICD-10-PCS | Mod: ,,, | Performed by: STUDENT IN AN ORGANIZED HEALTH CARE EDUCATION/TRAINING PROGRAM

## 2023-08-07 PROCEDURE — 11000001 HC ACUTE MED/SURG PRIVATE ROOM

## 2023-08-07 PROCEDURE — 25000003 PHARM REV CODE 250: Performed by: PATHOLOGY

## 2023-08-07 PROCEDURE — 97112 NEUROMUSCULAR REEDUCATION: CPT

## 2023-08-07 PROCEDURE — 63600175 PHARM REV CODE 636 W HCPCS: Mod: JZ,JG | Performed by: PATHOLOGY

## 2023-08-07 PROCEDURE — 99232 SBSQ HOSP IP/OBS MODERATE 35: CPT | Mod: ,,, | Performed by: STUDENT IN AN ORGANIZED HEALTH CARE EDUCATION/TRAINING PROGRAM

## 2023-08-07 PROCEDURE — 36415 COLL VENOUS BLD VENIPUNCTURE: CPT | Performed by: STUDENT IN AN ORGANIZED HEALTH CARE EDUCATION/TRAINING PROGRAM

## 2023-08-07 PROCEDURE — 36514 APHERESIS PLASMA: CPT

## 2023-08-07 RX ADMIN — ALBUMIN (HUMAN) 100 G: 12.5 SOLUTION INTRAVENOUS at 12:08

## 2023-08-07 RX ADMIN — CALCIUM GLUCONATE 2 G: 20 INJECTION, SOLUTION INTRAVENOUS at 12:08

## 2023-08-07 RX ADMIN — HEPARIN SODIUM 2400 UNITS: 1000 INJECTION, SOLUTION INTRAVENOUS; SUBCUTANEOUS at 12:08

## 2023-08-07 NOTE — NURSING
Initial Rounds: Thania is sitting up in bed watching tv with no s/sx of distress noted, no c/o pain.  remains at the bedside. Bed in lowest position with SR's up times 4 and call light in reach for safety purposes, bed alarm activated.  assists to her to the BR, gait unsteady at times. Appetite has improved with diet change. Lungs CTA with no cough noted.

## 2023-08-07 NOTE — SUBJECTIVE & OBJECTIVE
Interval History: Patient tolerating PLEX. Some improvement seen in physical exam.      Review of Systems   Constitutional:  Positive for activity change and fatigue.   Neurological:  Positive for speech difficulty. Negative for seizures.     Objective:     Vital Signs (Most Recent):  Temp: 97.2 °F (36.2 °C) (08/07/23 1124)  Pulse: 83 (08/07/23 1124)  Resp: 18 (08/07/23 1124)  BP: 108/69 (08/07/23 1124)  SpO2: 96 % (08/07/23 1124) Vital Signs (24h Range):  Temp:  [97.2 °F (36.2 °C)-98.7 °F (37.1 °C)] 97.2 °F (36.2 °C)  Pulse:  [70-83] 83  Resp:  [17-18] 18  SpO2:  [96 %-100 %] 96 %  BP: ()/(58-69) 108/69     Weight: 53.9 kg (118 lb 13.3 oz)  Body mass index is 20.4 kg/m².    Intake/Output Summary (Last 24 hours) at 8/7/2023 1239  Last data filed at 8/7/2023 0400  Gross per 24 hour   Intake 240 ml   Output 0 ml   Net 240 ml           Physical Exam  Constitutional:       General: She is not in acute distress.     Appearance: She is well-developed and normal weight. She is not diaphoretic.   Pulmonary:      Effort: Pulmonary effort is normal. No respiratory distress.   Skin:     General: Skin is warm and dry.      Coloration: Skin is not jaundiced or pale.   Neurological:      Mental Status: She is alert.      Cranial Nerves: No facial asymmetry.      Motor: No tremor or seizure activity.   Psychiatric:         Attention and Perception: Attention normal.         Speech: She is noncommunicative.         Behavior: Behavior is not aggressive. Behavior is cooperative.             Significant Labs: All pertinent labs within the past 24 hours have been reviewed.    Significant Imaging: I have reviewed all pertinent imaging results/findings within the past 24 hours.

## 2023-08-07 NOTE — PROCEDURES
[address] - Xray (Inpatient)  Transfusion Medicine  Procedure Note    SUMMARY   Procedures  Date of Procedure: 8/7/2023     Procedure: Plasma Exchange    Provider: Kodak Haines Jr, MD     Assisting Provider: None    Pre-Procedure Diagnosis: Neuropathy    Post-Procedure Diagnosis: Neuropathy    Follow-up Assessment: Very difficult case of a 37 y/o female with a complex neurological disorder of unknown etiology, with autoimmune encephalitis as a possible choice.  Neurology would like to consider a series of Plasma Exchanges.  We concur and will hopefully start tomorrow after a central line is placed.  Plan is 5 procedures over 5-7 days.  Avoid any ACE Inhibitors during the next week.       Today's PLEX, #4 of 5, completed without complications and was well tolerated.  Next PLEX planned for 08/08/23.    Pertinent Laboratory Data: Complete Blood Count:   Lab Results   Component Value Date    HGB 12.9 08/07/2023    HCT 38.1 08/07/2023     08/07/2023    WBC 7.55 08/07/2023     Comprehensive Metabolic Panel:   Lab Results   Component Value Date     08/07/2023    K 4.0 08/07/2023     08/07/2023    CO2 21 (L) 08/07/2023    GLU 91 08/07/2023    BUN 19 08/07/2023    CREATININE 0.6 08/07/2023    CALCIUM 9.5 08/07/2023    PROT 6.8 07/28/2023    ALBUMIN 3.8 07/28/2023    BILITOT 0.6 07/28/2023    ALKPHOS 91 07/28/2023    AST 24 07/28/2023    ALT 41 07/28/2023    ANIONGAP 11 08/07/2023       Pertinent Medications: n/a    Review of patient's allergies indicates:  No Known Allergies    Anesthesia: None     Technical Procedures Used: 8/8    Description of the Findings of the Procedure:     Please see Apheresis Nurse flowsheet for details.    The patient was evaluated and all clinical and laboratory data relevant to the treatment was reviewed, and a decision was made to proceed with the Apheresis procedure.    I was available to the clinical staff throughout the procedure.    Significant Surgical Tasks Conducted  by the Assistant(s): Not applicable    Complications: None    Estimated Blood Loss (EBL): None    Implants: None     Specimens: None

## 2023-08-07 NOTE — PLAN OF CARE
Problem: Adult Inpatient Plan of Care  Goal: Plan of Care Review  Outcome: Ongoing, Progressing     Problem: Adult Inpatient Plan of Care  Goal: Absence of Hospital-Acquired Illness or Injury  Outcome: Ongoing, Progressing     Problem: Adult Inpatient Plan of Care  Goal: Optimal Comfort and Wellbeing  Outcome: Ongoing, Progressing     Problem: Adult Inpatient Plan of Care  Goal: Readiness for Transition of Care  Outcome: Ongoing, Progressing     Problem: Fall Injury Risk  Goal: Absence of Fall and Fall-Related Injury  Outcome: Ongoing, Progressing     Problem: Seizure, Active Management  Goal: Absence of Seizure/Seizure-Related Injury  Outcome: Ongoing, Progressing     Problem: Infection  Goal: Absence of Infection Signs and Symptoms  Outcome: Ongoing, Progressing  Patient has been more alert this shift and moving hands/fingers better today.   continues with ROM exercises as well as her mother. Continue to encourage breast feeding to facilitate her and baby bonding. Up to bathroom with SBA d/t unsteadiness and generalized weakness. Appetite is good, diet texture changed and is tolerating well. Bed in lowest position and SR's up times 4 and padded for safety purposes. Bed alarm activated and call light in reach. Flow sheets completed see for assessment. Refuses SCD's

## 2023-08-07 NOTE — ASSESSMENT & PLAN NOTE
Patient opts to continue breastfeeding. Can call the [facility] [phone] if positive for prion disease.

## 2023-08-07 NOTE — PROGRESS NOTES
TPE #4     1220 Arrived to the patient's room to initiate treatment patient sitting up in chair with spouse at bedside. He assisted patient back to bed. Pt was identified by both nurses . Vitals  are stable appears in no acute distress. Pt is none verbal but will follow simple commands like if you are in pain blink your eye. Orientation is unable to access.      RIJ Trialysis line dressing CDI no signs symptoms of infection. Caps cleansed and 10 cc discarded from each line. Line flushed with 10 cc NS pushes and pulls well. 1230 treatment started patient tolerating cares well.     Medications:      5% Albumin 2 L   2 GM Calcium Gluc IV   Heparin 1.2 units in each port    1341 Treatment completed all blood returned vitals are stable patient in no acute distress. Catheter packed with Heparin 1.2 units in each port. Report given to Carito HERCULES patient left in her room in stable condition with spouse at bedside. Spouse is aware that she will run again tomorrow.  Pt left in her room in stable condition.

## 2023-08-07 NOTE — PROGRESS NOTES
[facility] Medicine  Progress Note    Patient Name: Thania Carroll  MRN: [id number]  Patient Class: IP- Inpatient   Admission Date: 2023  Length of Stay: 13 days  Attending Physician: Meredith Mitchell MD  Primary Care Provider: Joanna Primary Doctor        Subjective:     Principal Problem:Neurodegenerative disorder        HPI:  Thania Carroll is a 38 year old white woman,  2 para 2, with juvenile rheumatoid arthritis, catatonia. She lives in [place], [place]. She is . She has two children.   She presented to [facility] Emergency Department on 2023. She had been having progressive neurological deterioration over the past 8 months with weakness, speech changes, slowing of mentation, joint contractures of hands. She can ambulate with assistance and was cognizant of where she wanted to go in the house as her family assisted her. She was able to communicate only through yes-no questions. Her symptoms seemed to have worsened with her last pregnancy in 2022, though symptoms started prior to it. She first noticed she had trouble using her hands when holding her baby in May 2022 and told her mother about it in  or July. Her family noticed that she started slowing down in early . She took longer to wash her hair, had trouble orienting things, had trouble holding onto things and losing her , then noticed she was moving more slowly in general. She underwent two neurologic workups, one at [facility] and the other in [place] with neurologist Dr. Franco. She was diagnosed with catatonia at [facility]. She was referred to Psychiatry. Dr. Franco performed lumbar puncture. She was hospitalized in 2023 for further workup. Dr. Franco suspected an autoimmune disorder. She underwent an extended electroencephalogram at home that was normal. Her hands started to contract. She started having echolalia  (meaningles repetition) in September/October as well as palilalia (involuntary repeating words). She had staring episodes that lasted a minute or two. She noted more muscle atrophy in November 2022. She delivered in May 2023 and was still breastfeeding an eight week old baby. She had no speech for the last 3 months. Had had several falls over the past week. Her last fall was 3 days ago. She started having emotional lability in October/November 2022.  She was seen at [facility] Neurology clinic on 7/24/2023. The only medication she had ever been on was prednisone in January with no improvement, methylprednisolone with no response, and methotrexate. She was seen by Nohemy Brady PA-C and Dr. Harshal Lyons, who recommended lumbar puncture with encephalopathy panel to send to [facility] and movement panel to rule out NMDAr encephalitis and 14-3-3, total tau RT-QulC, neurofilament light chain, inpatient IVIG and methylprednisolone, electroencephalogram to evaluate for delta brush, electromyelogram. Her father noticed intermittent choking spells when eating.   Clinic and emergency department evaluations:  Cbc. Cmp. Cpk, lipid panel- nl  Heavy metal screen 6/2023 - nl  TFTs -nl  Movement Disorder, Autoimmune pannel, cerruloplasm, negative  NA 1:160  UPT negative  Trep Ab bucky  U tox neg  [place]'s D - neg  Lyme - neg  Hereditary Parkinson's - neg  CT head 1/2023  MRI head - done 6/15.23, some atrophy out of proportion for age, particularly in bilateral caudate and putamen noted by  Nohemy Brady PA-C.    She was admitted to Hospital Medicine Team N.      Overview/Hospital Course:  CT chest abdomen pelvis was done to assess for mass, particularly ovarian teratoma, which may be associated with autoimmune encephalitis (especially NMDA receptor encephalitis). It showed bilateral ground glass lung attenuation but did not show an ovarian tumor, so transvaginal ultrasound was done and also  showed no ovarian mass. She was put on continuous EEG to assess for abnormalities seen in NMDA receptor encephalitis (ie delta brush) vs abnormalities seen in other conditions such as Creutzfeld-Tripp Disease. Infection Control was consulted due to testing for prion disease. Fluoroscopy was consulted for lumbar puncture with opening pressure and CSF studies ordered: cell count and differential, protein, glucose, cytology and flow cytometry, gram stain and culture, autoimmune encephalopathy panel ([facility] [place] sendout panel), RT-quIC ([facility] prion center sendout lab). Neurology planned plasma exchange (PLEX) following lumbar puncture, and electromyography, likely outpatient. Lumbar puncture was done on 7/28/2023. CSF was used for labs ordered by the physician assistant she had seen in Neurology clinic rather than the labs ordered by the inpatient consult service, which made another lumbar puncture necessary before treatment. Lumbar puncture done, central line placed and PLEX started on 8/3/23.      Interval History: Patient tolerating PLEX. Some improvement seen in physical exam.      Review of Systems   Constitutional:  Positive for activity change and fatigue.   Neurological:  Positive for speech difficulty. Negative for seizures.     Objective:     Vital Signs (Most Recent):  Temp: 97.2 °F (36.2 °C) (08/07/23 1124)  Pulse: 83 (08/07/23 1124)  Resp: 18 (08/07/23 1124)  BP: 108/69 (08/07/23 1124)  SpO2: 96 % (08/07/23 1124) Vital Signs (24h Range):  Temp:  [97.2 °F (36.2 °C)-98.7 °F (37.1 °C)] 97.2 °F (36.2 °C)  Pulse:  [70-83] 83  Resp:  [17-18] 18  SpO2:  [96 %-100 %] 96 %  BP: ()/(58-69) 108/69     Weight: 53.9 kg (118 lb 13.3 oz)  Body mass index is 20.4 kg/m².    Intake/Output Summary (Last 24 hours) at 8/7/2023 1239  Last data filed at 8/7/2023 0400  Gross per 24 hour   Intake 240 ml   Output 0 ml   Net 240 ml           Physical Exam  Constitutional:       General: She is not in acute distress.      Appearance: She is well-developed and normal weight. She is not diaphoretic.   Pulmonary:      Effort: Pulmonary effort is normal. No respiratory distress.   Skin:     General: Skin is warm and dry.      Coloration: Skin is not jaundiced or pale.   Neurological:      Mental Status: She is alert.      Cranial Nerves: No facial asymmetry.      Motor: No tremor or seizure activity.   Psychiatric:         Attention and Perception: Attention normal.         Speech: She is noncommunicative.         Behavior: Behavior is not aggressive. Behavior is cooperative.             Significant Labs: All pertinent labs within the past 24 hours have been reviewed.    Significant Imaging: I have reviewed all pertinent imaging results/findings within the past 24 hours.      Assessment/Plan:      * Neurodegenerative disorder  Neurology following. CT and ultrasound showed no ovarian tumor. LP done but will need another to obtain more labs. Repeat LP done on 8/2/23. Anesthesia contacted for central line placement 8/3. PLEX started on 8/3    Bradykinesia        Ground glass opacity present on imaging of lung  No fever or leukocytosis. No hypoxia. Her cough is weak but she has not been coughing. Probably aspirations. Can get a follow-up chest X-ray.     Oropharyngeal dysphagia  SLP. Dental soft diet. Family wanted to defer modified barium swallow study.    Breastfeeding (infant)  Patient opts to continue breastfeeding. Can call the [facility] [phone] if positive for prion disease.    Juvenile rheumatoid arthritis  Chronic, stable.  GISEL :160, autoimmune panel is negative  Rheu Factor Neg        VTE Risk Mitigation (From admission, onward)         Ordered     heparin (porcine) injection 2,400 Units  Once         08/05/23 1501     enoxaparin injection 40 mg  Daily         07/25/23 1802     IP VTE HIGH RISK PATIENT  Once         07/25/23 1802     Place sequential compression device  Until discontinued         07/25/23 1802                 Discharge Planning   ALANNA: 8/9/2023     Code Status: Full Code   Is the patient medically ready for discharge?: No    Reason for patient still in hospital (select all that apply): Treatment and Consult recommendations  Discharge Plan A: Home with family, Home Health   Discharge Delays: None known at this time              Meredith Mitchell MD  Department of Hospital Medicine   [facility] - Neurosurgery ([facility])

## 2023-08-07 NOTE — PT/OT/SLP PROGRESS
"Occupational Therapy   Treatment    Name: Thania Carroll  MRN: [id number]  Admitting Diagnosis:  Neurodegenerative disorder  4 Days Post-Op    Recommendations:     Discharge Recommendations: other (see comments)  Discharge Equipment Recommendations:  none  Barriers to discharge:  None    Assessment:     Thania Carroll is a 38 y.o. female with a medical diagnosis of Neurodegenerative disorder.  She presents with performance deficits affecting function are weakness, decreased coordination, impaired cognition, impaired endurance, impaired self care skills, decreased upper extremity function, decreased lower extremity function, impaired functional mobility, gait instability, impaired balance.     Rehab Prognosis:  Good; patient would benefit from acute skilled OT services to address these deficits and reach maximum level of function.       Plan:     Patient to be seen 4 x/week to address the above listed problems via sensory integration, cognitive retraining, neuromuscular re-education, therapeutic exercises, therapeutic activities, self-care/home management  Plan of Care Expires: 08/23/23  Plan of Care Reviewed with: patient, spouse    Subjective   Patient:  "see you tomorrow."  Pain/Comfort:  Pain Rating 1: 0/10  Pain Rating Post-Intervention 1: 0/10    Objective:     Communicated with: Nurse prior to session.  Patient found supine with telemetry, central line upon OT entry to room.    General Precautions: Standard, aspiration, fall    Orthopedic Precautions:N/A  Braces: N/A  Respiratory Status: Room air     Occupational Performance:     AMPAC 6 Click ADL: 12    Treatment & Education:  Daily orientation provided.  P/AAROM performed bilateral UE/LEs one set x 10 rep in all planes of motion with stretches provided at end range; sustained stretch provided for shoulder flexion, wrist extension, MCP mobilization, finger extension.    Family present during the session.    Patient left supine with all lines intact, call button in " reach, and bed alarm on    GOALS:   Multidisciplinary Problems       Occupational Therapy Goals          Problem: Occupational Therapy    Goal Priority Disciplines Outcome Interventions   Occupational Therapy Goal     OT, PT/OT Ongoing, Progressing    Description: Goals set 8/5 to be addressed for 14 days with expiration date, 8/19:  Patient will increase functional independence with ADLs by performing:    Patient will demonstrate rolling to the right with modified independence.  Not met   Patient will demonstrate rolling to the left with modified independence.   Not met  Patient will demonstrate supine -sit with modified independence.   Not met  Patient will demonstrate stand pivot transfers with modified independence.   Not met  Patient will demonstrate grooming while standing with min assist.   Not met  Patient will demonstrate upper body dressing with min assist while seated EOB.   Not met  Patient will demonstrate lower body dressing with min assist while seated EOB.   Not met  Patient will demonstrate toileting with min assist.   Not met  Patient's family / caregiver will demonstrate independence and safety with assisting patient with self-care skills and functional mobility.     Not met  Patient's family / caregiver will demonstrate independence with providing ROM and changes in bed positioning.   Not met                       Time Tracking:     OT Date of Treatment: 08/07/23  OT Start Time: 0654  OT Stop Time: 0708  OT Total Time (min): 14 min    Billable Minutes:Neuromuscular Re-education 14    OT/MIA: OT          8/7/2023

## 2023-08-07 NOTE — PT/OT/SLP PROGRESS
Physical Therapy      Patient Name:  Thania Carroll   MRN:  [id number]    Patient not seen today secondary to Other (Comment). Pt with MD at bedside on attempt 1. Pt then breastfeeding her child with assistance of family on 2nd attempt. Will follow-up per POC as appropriate.

## 2023-08-08 VITALS
TEMPERATURE: 98 F | DIASTOLIC BLOOD PRESSURE: 68 MMHG | HEIGHT: 64 IN | SYSTOLIC BLOOD PRESSURE: 104 MMHG | HEART RATE: 104 BPM | BODY MASS INDEX: 20.32 KG/M2 | OXYGEN SATURATION: 100 % | WEIGHT: 119 LBS | RESPIRATION RATE: 18 BRPM

## 2023-08-08 LAB — REF LAB TEST RESULTS: NORMAL

## 2023-08-08 PROCEDURE — 92526 ORAL FUNCTION THERAPY: CPT

## 2023-08-08 PROCEDURE — 99233 SBSQ HOSP IP/OBS HIGH 50: CPT | Mod: ,,, | Performed by: PSYCHIATRY & NEUROLOGY

## 2023-08-08 PROCEDURE — 97116 GAIT TRAINING THERAPY: CPT | Mod: CQ

## 2023-08-08 PROCEDURE — 36514 PR THER APHERESIS,PLASMA PHERESIS: ICD-10-PCS | Mod: ,,, | Performed by: PATHOLOGY

## 2023-08-08 PROCEDURE — 97535 SELF CARE MNGMENT TRAINING: CPT

## 2023-08-08 PROCEDURE — 36514 APHERESIS PLASMA: CPT

## 2023-08-08 PROCEDURE — 99239 PR HOSPITAL DISCHARGE DAY,>30 MIN: ICD-10-PCS | Mod: ,,, | Performed by: STUDENT IN AN ORGANIZED HEALTH CARE EDUCATION/TRAINING PROGRAM

## 2023-08-08 PROCEDURE — 99239 HOSP IP/OBS DSCHRG MGMT >30: CPT | Mod: ,,, | Performed by: STUDENT IN AN ORGANIZED HEALTH CARE EDUCATION/TRAINING PROGRAM

## 2023-08-08 PROCEDURE — 63600175 PHARM REV CODE 636 W HCPCS: Performed by: PATHOLOGY

## 2023-08-08 PROCEDURE — 99233 PR SUBSEQUENT HOSPITAL CARE,LEVL III: ICD-10-PCS | Mod: ,,, | Performed by: PSYCHIATRY & NEUROLOGY

## 2023-08-08 PROCEDURE — 36514 APHERESIS PLASMA: CPT | Mod: ,,, | Performed by: PATHOLOGY

## 2023-08-08 PROCEDURE — P9045 ALBUMIN (HUMAN), 5%, 250 ML: HCPCS | Mod: JZ,JG | Performed by: PATHOLOGY

## 2023-08-08 PROCEDURE — 25000003 PHARM REV CODE 250: Performed by: PATHOLOGY

## 2023-08-08 RX ORDER — ALBUMIN HUMAN 50 G/1000ML
100 SOLUTION INTRAVENOUS ONCE
Status: COMPLETED | OUTPATIENT
Start: 2023-08-08 | End: 2023-08-08

## 2023-08-08 RX ORDER — CALCIUM GLUCONATE 20 MG/ML
2 INJECTION, SOLUTION INTRAVENOUS
Status: DISPENSED | OUTPATIENT
Start: 2023-08-08 | End: 2023-08-08

## 2023-08-08 RX ORDER — HEPARIN SODIUM 1000 [USP'U]/ML
2400 INJECTION, SOLUTION INTRAVENOUS; SUBCUTANEOUS ONCE
Status: COMPLETED | OUTPATIENT
Start: 2023-08-08 | End: 2023-08-08

## 2023-08-08 RX ADMIN — ALBUMIN (HUMAN) 100 G: 12.5 SOLUTION INTRAVENOUS at 08:08

## 2023-08-08 RX ADMIN — CALCIUM GLUCONATE 2 G: 20 INJECTION, SOLUTION INTRAVENOUS at 08:08

## 2023-08-08 RX ADMIN — HEPARIN SODIUM 2400 UNITS: 1000 INJECTION, SOLUTION INTRAVENOUS; SUBCUTANEOUS at 09:08

## 2023-08-08 NOTE — PT/OT/SLP PROGRESS
"Speech Language Pathology Treatment    Patient Name:  Thania Carroll   MRN:  [id number]  Admitting Diagnosis: Neurodegenerative disorder    Recommendations:                 General Recommendations:  Dysphagia therapy and Speech/language therapy  Diet recommendations:  Soft & Bite Sized Diet - IDDSI Level 6, Liquid Diet Level: Thin   Aspiration Precautions: Small bites/sips; 1 bite/sip at a time, Alternating bites/sips, Assistance with meals, Avoid talking while eating, Eliminate distractions, Feed only when awake/alert, Frequent oral care, HOB to 90 degrees, Monitor for s/s of aspiration, Remain upright 30 minutes post meal, , and Strict aspiration precautions   General Precautions: Standard, aspiration, fall  Communication strategies:  go to room if call light pushed    Assessment:     Thania Carroll is a 38 y.o. female with an SLP diagnosis of Aphasia, Dysphagia, and Cognitive-Linguistic Impairment.      Subjective     Pt vocalized on one occasion during session. Pt's  and mother present at bedside.     Pain/Comfort:  Pain Rating 1:  (no indications of pain)    Respiratory Status: Room air    Objective:     Has the patient been evaluated by SLP for swallowing?   Yes  Keep patient NPO? No   Current Respiratory Status:        Pt awake/alert laying in bed with , infant daughter, and mother as bedside.  Pt was nonverbal, but did vocalize on one occasion.  Pt's mother stated pt uses eye blinking for yes/no responses when she "doesn't feel like talking." Pt appeared to indicate "yes" when asked if she would like to eat some of her lunch.  Pt was able to follow mother's cues to push self up in bed to sit upright for PO intake.  Pt's mother fed pt the following PO intake: water via straw x 10, shredded beef tip x 2, finely chopped broccoli, and mechanical soft level rice x 1.  Pt exhibited overt coughing after initial straw sip of water, but no further overt s/s of aspiration were observed with subsequent sips as " pt's mother was able to pull straw away to ensure small boluses were received.  Pt tolerated all other consistencies without difficulty.  Education was provided to pt/family regarding aspiration, overt s/s of aspiration, precautions and strategies for minimizing risks of aspiration, and continued SLP services upon discharge.  Pt's family was able to express understanding, but pt was unable to do so due to cognitive-communicative deficits.     Goals:   Multidisciplinary Problems       SLP Goals          Problem: SLP    Goal Priority Disciplines Outcome   SLP Goal     SLP Ongoing, Progressing   Description: Speech Language Pathology Goals  Goals expected to be met by 8/9/23  1. Pt will participate in ongoing assessment of swallow function to determine safest, least restrictive means of nutrition/hydration  2. Educate Pt and family on aspiration precautions and SLP POC  3. Pt will establish yes/no response provided max cues  4. Pt orient x2 via any modality 50% of attempts or more, max cues  5. Pt will participate in ongoing assessment of language and cognition to determine ongoing POC needs                          Plan:     Patient to be seen:  2 x/week   Plan of Care expires:  08/25/23  Plan of Care reviewed with:  patient, spouse, mother   SLP Follow-Up:  Yes       Discharge recommendations:   (continued SLP services)     Time Tracking:     SLP Treatment Date:   08/08/23  Speech Start Time:  1251  Speech Stop Time:  1306     Speech Total Time (min):  15 min    Billable Minutes:  Treatment Swallowing Dysfunction 7 and Self Care/Home Management Training 8    08/08/2023

## 2023-08-08 NOTE — PLAN OF CARE
Problem: Adult Inpatient Plan of Care  Goal: Plan of Care Review  Outcome: Ongoing, Progressing  Flowsheets (Taken 8/8/2023 0400)  Plan of Care Reviewed With:   patient   spouse   father   mother  Goal: Optimal Comfort and Wellbeing  Outcome: Ongoing, Progressing     Problem: Fall Injury Risk  Goal: Absence of Fall and Fall-Related Injury  Outcome: Ongoing, Progressing     Problem: Seizure, Active Management  Goal: Absence of Seizure/Seizure-Related Injury  Outcome: Ongoing, Progressing     Problem: Infection  Goal: Absence of Infection Signs and Symptoms  Outcome: Ongoing, Progressing     Problem: Skin Injury Risk Increased  Goal: Skin Health and Integrity  Outcome: Ongoing, Progressing       POC updated and reviewed with the patient/family at bedside. Questions regarding POC were encouraged and addressed. VSS, see flowsheets. Patient is AO x self at this time. Fall/safety precautions maintained. Seizure precautions maintained. No signs of injury noted over night. Patient was repositioned for comfort with bed locked in low position, side rails up x 3, bed alarm set, and call light within reach. Instructed patient to call staff for mobility, verbalized understanding. No acute signs of distress noted at this time.

## 2023-08-08 NOTE — PLAN OF CARE
08/08/23 1617   Final Note   Assessment Type Final Discharge Note   Anticipated Discharge Disposition Home     Patient d/c home w/ family. No needs. Evelin Angulo RN

## 2023-08-08 NOTE — PROGRESS NOTES
"[facility] - Neurosurgery ([facility])  Neurology  Progress Note    Patient Name: Thania Carroll  MRN: [id number]  Admission Date: 7/25/2023  Hospital Length of Stay: 14 days  Code Status: Full Code   Attending Provider: Meredith Mitchell MD  Primary Care Physician: Joanna, Primary Doctor   Principal Problem:Neurodegenerative disorder    HPI:   Ms. Carroll is a 38 year-old female with a history of juvenile rheumatoid arthritis, who presents as a direct admission from movement disorders clinic for inpatient lumbar puncture for work-up of progressively worsening behavioral changes and parkinsonism. History is obtained per discussion with movement disorders clinic staff (Nohemy Brady), chart review, and from the patient's father who is at bedside. The patient has been experiencing the above changes since 2022, and per chart review and discussion with family it appears that there was significant worsening around August and then November of that year. Initial symptoms are described by family as a "slowing of her thinking and motivation." She was slow to respond to others, less communicative, and was having more difficulties with ADLs, including taking care of her baby. As this worsened, additional motor symptoms became evident. Bradykinesia was initially predominant, affecting first the bilateral upper extremities, and then the lower extremities particularly in regard to slowing of gait and turning progressing to gait instability with falls. The distal upper extremities also became progressively weaker, leading to difficulty with motor tasks and further impairment of daily activities, and subsequent palmar muscle atrophy and bilateral "clawing" of the hands. She was become markedly less communicative over the last three or so months per family, and per chart review has been exhibiting both echolalia and palilalia since September/October of last year. Additionally has suffered from dysphagia to both solids and liquids. All of " this has progressed to the point of her being unable to work, and ultimately unable to care for herself.    She originally presented to OSH for bradykinesia and apathy. Received a diagnosis of catatonia and was referred to psychiatry. Seen by neurology in [facility] in 11/2022 and was again diagnosed with catatonia. Additionally seen by neurology in [place], and there was concern to autoimmune disorder, however was reportedly lost to follow-up. Currently follows in the [facility] movement disorders clinic. She has undergone an extensive outpatient work-up thus far, including testing for hakan's disease, hereditary forms of parkinson's disease, frontotemporal dementia, NMDA receptor encephalitis, autoimmune disease (GISEL, dsDNA, smith), Varun's disease, Sjorgen's: all of which appears to have been negative, aside from positive GISEL. Additionally had peripheral blood smear done, which was without acanthocytosis. MRI brain in 2023 demonstrates frontal and temporal lobe atrophy, as well as bilateral caudate and putamen atrophy. She has previously received 5 day course of pulse-dose steroids without any improvement. 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 Admitted to hospital medicine with neurology consultation.      Overview/Hospital Course:  No notes on file      Review of Systems   Unable to perform ROS: Mental status change     Objective:     Vital Signs (Most Recent):  Temp: 97.7 °F (36.5 °C) (08/08/23 1041)  Pulse: 104 (08/08/23 1041)  Resp: 18 (08/08/23 1041)  BP: 104/68 (08/08/23 1041)  SpO2: 100 % (08/08/23 1041) Vital Signs (24h Range):  Temp:  [96.6 °F (35.9 °C)-98.7 °F (37.1 °C)] 97.7 °F (36.5 °C)  Pulse:  [] 104  Resp:  [15-20] 18  SpO2:  [97 %-100 %] 100 %  BP: ()/(58-76) 104/68     Weight: 54 kg (119 lb)  Body mass index is 20.43  kg/m².     Physical Exam   Neurological Exam:  MENTAL STATUS  Level of consciousness: alert  Orientation: unable to answer orientation questions  Moves all four extremities spontaneously.  Blinks to threat.  Occasionally verbalizes.  Able to follow commands, including blinking, getting up to walk.  No startle myoclonus.    CRANIAL NERVES  CN III, IV, VI: PERRL, EOMI  CN V: facial sensation intact, muscles of mastication intact  CN VII: hypomimia  CN IX, X: speech, when present, is soft and monotone    MOTOR EXAM  Muscle bulk: generally diminished, though this is most evident in the bilateral upper extremities, with profound palmar atrophy  Muscle tone: rigidity, most prominent in the bilateral upper extremities (distal >proximal); no spasticity    Strength - Upper Extremities   Arm abduction Elbow flexion Elbow extension Wrist flexion Wrist extension Finger abduction   Right 4/5 4/5 4/5 4/5 4/5 3/5   Left 4/5 4/5 4/5 4/5 4/5 3/5     Strength - Lower Extremities   Hip flexion Knee flexion Knee extension Dorsiflexion Plantarflexion   Right 5/5 5/5 5/5 5/5 5/5   Left 5/5 5/5 5/5 5/5 5/5     REFLEXES   Biceps Triceps Brachioradialis Patellar Achilles   Right +2 +2 +2 +2 +2   Left +2 +2 +2 +2 +2     Planter reflex: down-going bilaterally    SENSORY EXAM  Withdraws to pain in all four extremities. Assessment of specific modalities (vibration, pin-prick, temperature, proprioception) is limited due to mental status.    COORDINATION  Tremor: none  Gait: slow gait with reduced arm swing and slow turning.       Significant Labs: All pertinent lab results from the past 24 hours have been reviewed.    Significant Imaging:     MRI brain 8/6/23:        I have reviewed all pertinent imaging results/findings within the past 24 hours.    Assessment and Plan:     * Neurodegenerative disorder  This is a 38 year-old female who presents as a direct admission from movement disorders clinic for work and treatment of behavioral changes  accompanied by parkinsonism and upper extremity weakness/atrophy. This has been progressively worsening since at least August 2022. Initial changes were behavioral, with reduced motivation, slowness in thought/speech, with motor symptoms (bradykinesia, subsequent upper extremity weakness and atrophy) being evident afterwards. She has received an extensive outpatient workup for inherited neurologic disease, autoimmune disease, which aside from a positive GISEL, has been unrevealing. Previously diagnosed with catatonia. MRI brain in May 2023 reviewed by myself and Dr. Camara: shows bilateral atrophy of caudate and putamen, as well as bilateral frontal and temporal lobes. 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    -CT chest, abdomen, pelvis and transvaginal ultrasound both negative for ovarian mass  -EEG is without abnormalities consistent w/CJD or NMDA receptor encephalitis    Plan  ->CSF studies: cell count and differential, protein, glucose, cytology and flow cytometry, gram stain and culture, autoimmune encephalopathy panel ([facility] [place] sendout panel), RT-quIC ([facility] sendout lab): pending/in process. SP 2 LPs. Some of these results are on hold given previous LP testing for CJD  -> Discussed with apheresis team and primary team. Will plan for plasma exchange (PLEX). Started on 8/3 - Subjective improvement so far, per family she is talking more and seems more aware of her surroundings, interacts more.   - Stable for DC from a neurological standpoint. Will plan for follow up with neuro immunology clinic   - Neurology will sign off. Please call if any questions or concerns.         VTE Risk Mitigation (From admission, onward)         Ordered     enoxaparin injection 40 mg  Daily         07/25/23 1802     IP VTE HIGH RISK PATIENT  Once         07/25/23 1802     Place sequential  compression device  Until discontinued         07/25/23 1479                Bryson Caballero MD  Neurology  [facility] - [facility] ([facility]

## 2023-08-08 NOTE — NURSING
Remove the catheter right jugular, hold pressure for 10 min, and gauze , Tegaderm and adhesive dsg. . No bleeding after 30 min noted.family educated to take off the dressing tomorrow. And monitor for bleeding. Waiting for wheelchair.

## 2023-08-08 NOTE — PROCEDURES
Date/Time: 8/6/2023 8:12 AM     Performed by: Lenin Diaz MD  Authorized by: Lenin Diaz MD          Date of Procedure: 8/5/23      Procedure: Plasma Exchange     Provider: Nara Diaz MD      Assisting Provider: None     Pre-Procedure Diagnosis: Neurodegenerative disorder     Post-Procedure Diagnosis: Neurodegenerative disorder     Follow-up Assessment: Very difficult case of a 39 y/o female with a complex neurological disorder of unknown etiology, with autoimmune encephalitis as a possible choice.  Neurology would like to consider a series of Plasma Exchanges.  We concur and will hopefully start tomorrow after a central line is placed.  Plan is 5 procedures over 5-7 days.  Avoid any ACE Inhibitors during the next week.       Today's PLEX, #5 of 5, completed without complications and was well tolerated.     Pertinent Laboratory Data: Complete Blood Count:         Lab Results   Component Value Date     HGB 13.8 07/28/2023     HCT 41.8 07/28/2023      07/28/2023     WBC 5.85 07/28/2023      Basic Metabolic Panel:         Lab Results   Component Value Date      07/28/2023     K 3.8 07/28/2023      07/28/2023     CO2 22 (L) 07/28/2023     GLU 86 07/28/2023     BUN 17 07/28/2023     CREATININE 0.8 07/28/2023     CALCIUM 9.7 07/28/2023     ANIONGAP 10 07/28/2023     ESTGFRAFRICA 125 01/04/2023         Pertinent Medications: None contraindicated for PLEX     Review of patient's allergies indicates:  No Known Allergies     Anesthesia: None     Technical Procedures Used: Plasma Exchange: Volume exchanged - 2.0 Liters; Replacement fluid - Albumin; Number of procedures 5; Date of next procedure series is complete.     Description of the Findings of the Procedure:      Please see Apheresis Nurse flowsheet for details.     The patient was evaluated and all clinical and laboratory data relevant to the treatment was reviewed, and a decision was made to proceed with the Apheresis procedure.      I was available to the clinical staff throughout the procedure.     Significant Surgical Tasks Conducted by the Assistant(s): Not applicable     Complications: None     Estimated Blood Loss (EBL): None     Implants: None      Specimens: None

## 2023-08-08 NOTE — PLAN OF CARE
Problem: Fall Injury Risk  Goal: Absence of Fall and Fall-Related Injury  Outcome: Ongoing, Progressing     Problem: Adult Inpatient Plan of Care  Goal: Optimal Comfort and Wellbeing  Outcome: Ongoing, Progressing     POC reviewed with the patient and they verbalized understanding. Family at bedside, completed all 5 tx. Go home soon.All comments and concerns addressed. Bed locked in lowest position with bed alarm set, call light within reach. Safety precautions maintained. VSS, see flowsheets. No events this shift. Will continue to monitor for changes to POC and clinical condition.

## 2023-08-08 NOTE — ASSESSMENT & PLAN NOTE
This is a 38 year-old female who presents as a direct admission from movement disorders clinic for work and treatment of behavioral changes accompanied by parkinsonism and upper extremity weakness/atrophy. This has been progressively worsening since at least August 2022. Initial changes were behavioral, with reduced motivation, slowness in thought/speech, with motor symptoms (bradykinesia, subsequent upper extremity weakness and atrophy) being evident afterwards. She has received an extensive outpatient workup for inherited neurologic disease, autoimmune disease, which aside from a positive GISEL, has been unrevealing. Previously diagnosed with catatonia. MRI brain in May 2023 reviewed by myself and Dr. Camara: shows bilateral atrophy of caudate and putamen, as well as bilateral frontal and temporal lobes. 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    -CT chest, abdomen, pelvis and transvaginal ultrasound both negative for ovarian mass  -EEG is without abnormalities consistent w/CJD or NMDA receptor encephalitis    Plan  ->CSF studies: cell count and differential, protein, glucose, cytology and flow cytometry, gram stain and culture, autoimmune encephalopathy panel ([facility] [place] sendout panel), RT-quIC ([facility] prion center sendout lab): pending/in process. SP 2 LPs. Some of these results are on hold given previous LP testing for CJD  -> Discussed with apheresis team and primary team. Will plan for plasma exchange (PLEX). Started on 8/3 - Subjective improvement so far, per family she is talking more and seems more aware of her surroundings, interacts more.   - Stable for DC from a neurological standpoint. Will plan for follow up with neuro immunology clinic   - Neurology will sign off. Please call if any questions or concerns.

## 2023-08-08 NOTE — PROCEDURES
"[facility] - Xray (Inpatient)  Transfusion Medicine  Procedure Note    SUMMARY   Procedures  Date of Procedure: 8/7/2023    Procedure: Plasma Exchange    Provider: Kodak Haines Jr, MD     Assisting Provider: None    Pre-Procedure Diagnosis:  Neurodegenerative disorder    Post-Procedure Diagnosis:  Neurodegenerative disorder    Follow-up Assessment: Very difficult case of a 37 y/o female with a complex neurological disorder of unknown etiology, with autoimmune encephalitis as a possible choice.  Neurology would like to consider a series of Plasma Exchanges.  We concur and will hopefully start tomorrow after a central line is placed.  Plan is 5 procedures over 5-7 days.  Avoid any ACE Inhibitors during the next week.       Today's PLEX, #4 of 5, completed without complications and was well tolerated.  Next PLEX planned for 08/07/23.       Pertinent Laboratory Data: Complete Blood Count:   Lab Results   Component Value Date    HGB 12.9 08/07/2023    HCT 38.1 08/07/2023     08/07/2023    WBC 7.55 08/07/2023     Lactate Dehydrogenase (LDH): No results found for: "LDH"  Basic Metabolic Panel:   Lab Results   Component Value Date     08/07/2023    K 4.0 08/07/2023     08/07/2023    CO2 21 (L) 08/07/2023    GLU 91 08/07/2023    BUN 19 08/07/2023    CREATININE 0.6 08/07/2023    CALCIUM 9.5 08/07/2023    ANIONGAP 11 08/07/2023    ESTGFRAFRICA 125 01/04/2023     Comprehensive Metabolic Panel:   Lab Results   Component Value Date     08/07/2023    K 4.0 08/07/2023     08/07/2023    CO2 21 (L) 08/07/2023    GLU 91 08/07/2023    BUN 19 08/07/2023    CREATININE 0.6 08/07/2023    CALCIUM 9.5 08/07/2023    PROT 6.8 07/28/2023    ALBUMIN 3.8 07/28/2023    BILITOT 0.6 07/28/2023    ALKPHOS 91 07/28/2023    AST 24 07/28/2023    ALT 41 07/28/2023    ANIONGAP 11 08/07/2023       Pertinent Medications: n/a    Review of patient's allergies indicates:  No Known Allergies    Anesthesia: None     Technical " Procedures Used: Plasma Exchange: Volume exchanged - 2.0 Liters; Replacement fluid - Albumin; Number of procedures 4; Date of next procedure 8/8.    Description of the Findings of the Procedure:     Please see Apheresis Nurse flowsheet for details.    The patient was evaluated and all clinical and laboratory data relevant to the treatment was reviewed, and a decision was made to proceed with the Apheresis procedure.    I was available to the clinical staff throughout the procedure.    Significant Surgical Tasks Conducted by the Assistant(s): Not applicable    Complications: None    Estimated Blood Loss (EBL): None    Implants: None     Specimens: None

## 2023-08-08 NOTE — PT/OT/SLP PROGRESS
Physical Therapy Treatment    Patient Name:  Thania Carroll   MRN:  [id number]    Recommendations:     Discharge Recommendations: other (see comments)  Discharge Equipment Recommendations: none  Barriers to discharge: None    Assessment:     Thania Carroll is a 38 y.o. female admitted with a medical diagnosis of Neurodegenerative disorder.  She presents with the following impairments/functional limitations: weakness, impaired endurance, impaired self care skills, impaired functional mobility, impaired balance, decreased coordination, impaired cognition, decreased upper extremity function, decreased lower extremity function, decreased safety awareness, gait instability, decreased ROM, impaired coordination, impaired fine motor.    Pt tolerates session well with focus on bed mobility, transfers, and gait training. Pt progressing well with minor command following noted throughout session. Basic commands followed including items such as directional changes during gait and movements with bed mobility. Tendency for pt to automatically attempt to continue climbing stairs reciprocally  rather than performing step to as cued by therapist. Pt returned to room following prolonged gait trials and assisted to bedside chair. Pt will continue to benefit from therapy services to address impairments listed above.     Rehab Prognosis: Good; patient would benefit from acute skilled PT services to address these deficits and reach maximum level of function.    Recent Surgery: Procedure(s) (LRB):  MPU PROCEDURE (N/A) 5 Days Post-Op    Plan:     During this hospitalization, patient to be seen 3 x/week to address the identified rehab impairments via gait training, therapeutic activities, therapeutic exercises, neuromuscular re-education and progress toward the following goals:    Plan of Care Expires:  08/26/23    Subjective     Chief Complaint: no c/o  Patient/Family Comments/goals: non-verbal  Pain/Comfort:  Pain Rating 1: 0/10  Pain Rating  Post-Intervention 1: 0/10      Objective:     Communicated with RN prior to session.  Patient found HOB elevated with central line, telemetry upon PTA entry to room. Spouse at bedside.     General Precautions: Standard, aspiration, fall  Orthopedic Precautions: N/A  Braces: N/A  Respiratory Status: Room air     Functional Mobility:  Bed Mobility:     Supine to Sit: moderate assistance  Transfers:     Sit to Stand:  contact guard assistance with hand-held assist  Gait: Pt ambulates greater than 500 ft with no AD and primarily Min A for balance and safety. Mod A intermittently with some LOBs. Unsteady with intermittent LOBs laterally. When challenged with perturbations at shoulder level with gait she loses her balance laterally with delayed and ineffective cross over stepping strategy  Stairs:  Pt ascended/descended 10 stair(s) with No Assistive Device with no handrails with Moderate Assistance. Posterior LOB with vaulting motion while ascending. Pt impulsively ascends reciprocally despite maximal cueing, pt does not react to correct LOBs      AM-PAC 6 CLICK MOBILITY  Turning over in bed (including adjusting bedclothes, sheets and blankets)?: 3  Sitting down on and standing up from a chair with arms (e.g., wheelchair, bedside commode, etc.): 3  Moving from lying on back to sitting on the side of the bed?: 3  Moving to and from a bed to a chair (including a wheelchair)?: 3  Need to walk in hospital room?: 3  Climbing 3-5 steps with a railing?: 3  Basic Mobility Total Score: 18       Patient left up in chair with all lines intact, call button in reach, and spouse present.    GOALS:   Multidisciplinary Problems       Physical Therapy Goals          Problem: Physical Therapy    Goal Priority Disciplines Outcome Goal Variances Interventions   Physical Therapy Goal     PT, PT/OT Ongoing, Progressing     Description: Goals to be met by: 8/9/2023     Patient will increase functional independence with mobility by  performin. Supine to sit with Stand-by Assistance  2. Sit to stand from bedside chair with Supervision  3. Gait  x 200 feet with Supervision using No Assistive Device and no LOB  4. Ascend/descend 2 stair with no Handrails Stand-by Assistance using No Assistive Device.                          Time Tracking:     PT Received On: 23  PT Start Time: 0746     PT Stop Time: 0800  PT Total Time (min): 14 min     Billable Minutes: Gait Training 14    Treatment Type: Treatment  PT/PTA: PTA     Number of PTA visits since last PT visit: 2     2023

## 2023-08-08 NOTE — SUBJECTIVE & OBJECTIVE
Review of Systems   Unable to perform ROS: Mental status change     Objective:     Vital Signs (Most Recent):  Temp: 97.7 °F (36.5 °C) (08/08/23 1041)  Pulse: 104 (08/08/23 1041)  Resp: 18 (08/08/23 1041)  BP: 104/68 (08/08/23 1041)  SpO2: 100 % (08/08/23 1041) Vital Signs (24h Range):  Temp:  [96.6 °F (35.9 °C)-98.7 °F (37.1 °C)] 97.7 °F (36.5 °C)  Pulse:  [] 104  Resp:  [15-20] 18  SpO2:  [97 %-100 %] 100 %  BP: ()/(58-76) 104/68     Weight: 54 kg (119 lb)  Body mass index is 20.43 kg/m².     Physical Exam   Neurological Exam:  MENTAL STATUS  Level of consciousness: alert  Orientation: unable to answer orientation questions  Moves all four extremities spontaneously.  Blinks to threat.  Occasionally verbalizes.  Able to follow commands, including blinking, getting up to walk.  No startle myoclonus.    CRANIAL NERVES  CN III, IV, VI: PERRL, EOMI  CN V: facial sensation intact, muscles of mastication intact  CN VII: hypomimia  CN IX, X: speech, when present, is soft and monotone    MOTOR EXAM  Muscle bulk: generally diminished, though this is most evident in the bilateral upper extremities, with profound palmar atrophy  Muscle tone: rigidity, most prominent in the bilateral upper extremities (distal >proximal); no spasticity    Strength - Upper Extremities   Arm abduction Elbow flexion Elbow extension Wrist flexion Wrist extension Finger abduction   Right 4/5 4/5 4/5 4/5 4/5 3/5   Left 4/5 4/5 4/5 4/5 4/5 3/5     Strength - Lower Extremities   Hip flexion Knee flexion Knee extension Dorsiflexion Plantarflexion   Right 5/5 5/5 5/5 5/5 5/5   Left 5/5 5/5 5/5 5/5 5/5     REFLEXES   Biceps Triceps Brachioradialis Patellar Achilles   Right +2 +2 +2 +2 +2   Left +2 +2 +2 +2 +2     Planter reflex: down-going bilaterally    SENSORY EXAM  Withdraws to pain in all four extremities. Assessment of specific modalities (vibration, pin-prick, temperature, proprioception) is limited due to mental  status.    COORDINATION  Tremor: none  Gait: slow gait with reduced arm swing and slow turning.       Significant Labs: All pertinent lab results from the past 24 hours have been reviewed.    Significant Imaging:     MRI brain 8/6/23:        I have reviewed all pertinent imaging results/findings within the past 24 hours.

## 2023-08-08 NOTE — PROGRESS NOTES
TPE #5     0025 Arrived to the patient's room to initiate treatment patient sitting up in chair with spouse at bedside. He assisted patient back to bed. Pt was identified by staff nurse and spouse . Vitals  are stable appears in no acute distress. Patient is none verbal but will follow simple commands like if you are in pain blink your eye.      RIJ Trialysis line dressing CDI no signs symptoms of infection. Caps cleansed and 10 cc discarded from each line. Line flushed with 10 cc NS pushes and pulls well. 0829 treatment started patient tolerating cares well. Rinse back started 0919.     Medications:      5% Albumin 2 Liters   2 Grams Calcium Gluc IV   Heparin 1.2 units in each port     0928 Treatment completed all blood returned vitals are stable patient in no acute distress. Catheter packed with Heparin 1.2 units in each port. Report given to DIOMEDES Segura patient left in her room in stable condition with spouse at bedside. Last ordered treatment of series.

## 2023-08-08 NOTE — PROCEDURES
Date/Time: 8/8/23     Performed by: Lenin Diaz MD  Authorized by: Lenin Diaz MD          Date of Procedure: 8/8/23      Procedure: Plasma Exchange     Provider: Nara Diaz MD      Assisting Provider: None     Pre-Procedure Diagnosis: Neurodegenerative disorder     Post-Procedure Diagnosis: Neurodegenerative disorder     Follow-up Assessment: Very difficult case of a 39 y/o female with a complex neurological disorder of unknown etiology, with autoimmune encephalitis as a possible choice.  Neurology would like to consider a series of Plasma Exchanges.  We concur and will hopefully start tomorrow after a central line is placed.  Plan is 5 procedures over 5-7 days.  Avoid any ACE Inhibitors during the next week.       Today's PLEX, #5 of 5, completed without complications and was well tolerated.  Series is complete.   Pertinent Laboratory Data: Complete Blood Count:         Lab Results   Component Value Date     HGB 13.8 07/28/2023     HCT 41.8 07/28/2023      07/28/2023     WBC 5.85 07/28/2023      Basic Metabolic Panel:         Lab Results   Component Value Date      07/28/2023     K 3.8 07/28/2023      07/28/2023     CO2 22 (L) 07/28/2023     GLU 86 07/28/2023     BUN 17 07/28/2023     CREATININE 0.8 07/28/2023     CALCIUM 9.7 07/28/2023     ANIONGAP 10 07/28/2023     ESTGFRAFRICA 125 01/04/2023         Pertinent Medications: None contraindicated for PLEX     Review of patient's allergies indicates:  No Known Allergies     Anesthesia: None     Technical Procedures Used: Plasma Exchange: Volume exchanged - 2.0 Liters; Replacement fluid - Albumin; Number of procedures 5; Date of next procedure 8/7/23.     Description of the Findings of the Procedure:      Please see Apheresis Nurse flowsheet for details.     The patient was evaluated and all clinical and laboratory data relevant to the treatment was reviewed, and a decision was made to proceed with the Apheresis procedure.     I  was available to the clinical staff throughout the procedure.     Significant Surgical Tasks Conducted by the Assistant(s): Not applicable     Complications: None     Estimated Blood Loss (EBL): None     Implants: None      Specimens: None

## 2023-08-09 ENCOUNTER — PATIENT MESSAGE (OUTPATIENT)
Dept: NEUROLOGY | Facility: CLINIC | Age: 38
End: 2023-08-09
Payer: COMMERCIAL

## 2023-08-09 DIAGNOSIS — F48.2 PSEUDOBULBAR AFFECT: ICD-10-CM

## 2023-08-09 DIAGNOSIS — G20.C PARKINSONISM, UNSPECIFIED PARKINSONISM TYPE: Primary | ICD-10-CM

## 2023-08-09 LAB
AMPHIPHYSIN AB TITR CSF: NEGATIVE {TITER}
ANNOTATION COMMENT IMP: NORMAL
BACTERIA CSF CULT: NO GROWTH
CV2 IGG TITR CSF: NEGATIVE {TITER}
GLIAL NUC TYPE 1 AB TITR CSF: NEGATIVE {TITER}
GRAM STN SPEC: NORMAL
GRAM STN SPEC: NORMAL
HU1 AB TITR CSF IF: NEGATIVE {TITER}
HU2 AB TITR CSF IF: NEGATIVE {TITER}
HU3 AB TITR CSF: NEGATIVE {TITER}
PARANEOPLASTIC INTERPRETATION,CSF: NORMAL
PCA-2 AB TITR CSF: NEGATIVE {TITER}
PCA-TR AB TITR CSF: NEGATIVE {TITER}
PURKINJE CELLS AB TITR CSF IF: NEGATIVE {TITER}

## 2023-08-10 LAB
AMPA-R AB CBA, CSF: NEGATIVE
AMPHIPHYSIN AB TITR CSF: NEGATIVE {TITER}
ANNOTATION COMMENT IMP: NORMAL
CASPR2-IGG CBA, CSF: NEGATIVE
CV2 IGG TITR CSF: NEGATIVE {TITER}
DPPX IGG CSF QL IF: NEGATIVE
GABA-B-R AB, CBA, CSF: NEGATIVE
GAD65 AB CSF-SCNC: 0 NMOL/L
GFAP IFA, CSF: NEGATIVE
GLIAL NUC TYPE 1 AB TITR CSF: NEGATIVE {TITER}
HU1 AB TITR CSF IF: NEGATIVE {TITER}
HU2 AB TITR CSF IF: NEGATIVE {TITER}
HU3 AB TITR CSF: NEGATIVE {TITER}
IGLON5 IFA, CSF: NEGATIVE
IMMUNOLOGIST REVIEW: NORMAL
LGI1-IGG CBA,CSF: NEGATIVE
M NEUROCHONDRIN IFA, CSF: NEGATIVE
M SEPTIN-7 IFA, CSF: NEGATIVE
MGLUR1 AB IFA, CSF: NEGATIVE
NIF IFA, CSF: NEGATIVE
NMDAR1 AB, CBA, CSF: NEGATIVE
PCA-2 AB TITR CSF: NEGATIVE {TITER}
PCA-TR AB TITR CSF: NEGATIVE {TITER}
PURKINJE CELLS AB TITR CSF IF: NEGATIVE {TITER}

## 2023-08-10 NOTE — PHYSICIAN QUERY
PT Name: Thania Carroll  MR #: [id number]     Documentation Clarification      CDS/: Karine Linton RN BSN             Contact information:[email or web address]    This form is a permanent document in the medical record.     Query Date: August 10, 2023    By submitting this query, we are merely seeking further clarification of documentation. Please utilize your independent clinical judgment when addressing the question(s) below.    The Medical Record reflects the following:    Clinical Findings Location in Medical Records   7/25 ED  38-year-old female with PMHx of Juvenile RA presents to the ED at the recommendation of her neurologist for progressive neurological deterioration over the past 8 months.  History is provided by patient's mother and  due to her condition. Her symptoms include weakness, speech changes, slowing of mentation, joint contractures of hands. She is able to communicate only through yes-no questions.    Neurological: She is alert. She displays no tremor. She exhibits abnormal muscle tone (bilateral. Decrease strenght in RLE compared to LLE. Weakness worse in UE compared to LE).   Alert, responsive though slow. Can only answer yes-no questions    7/25 Neurology Consult  Chief Complaint:  Encephalopathy with bradykinesia and upper extremity weakness/atrophy    7/27  PN    Speech: She is noncommunicative. Speech is delayed.         Behavior: Behavior is slowed and withdrawn.     7/27 Neurology PN  MENTAL STATUS  Level of consciousness: alert  Orientation: unable to answer orientation questions  Moves all four extremities spontaneously.  Blinks to threat.  Occasionally verbalizes.  Able to follow commands, including blinking, getting up to walk.  No startle myoclonus    8/6  PN   Mental Status: She is alert.     8/8 Neurology PN   Neurodegenerative disorder 7/25/23 ED                      7/25/23 Neurology Consult        7/27/23 Hospital Medicine Progress note      7/27/23 Neurology  Progress note                  8/6/23 Hospital Medicine Progress note          8/8/23 Neurology progress note           Due to the conflicting clinical picture, please clinically validate the diagnosis of Encephalopathy.   If validated, please provide additional clinical support for the diagnosis.       [   ] Above stated diagnosis is not confirmed and/or it has been ruled out   [   ] Above stated diagnosis is not confirmed and/or it has been ruled out, other diagnosis ruled in (please          specify):_______________   [  x ] Above stated diagnosis is confirmed. Please specify clinical support (signs, symptoms, and treatment) for  the confirmed diagnosis: __Patient has altered mental state and is undergoing treatment for autoimmune encephalopathy__   [   ] Other clarification (please specify): ___________________         Form No. [id number]

## 2023-08-11 ENCOUNTER — PATIENT MESSAGE (OUTPATIENT)
Dept: NEUROLOGY | Facility: CLINIC | Age: 38
End: 2023-08-11
Payer: COMMERCIAL

## 2023-08-11 ENCOUNTER — TELEPHONE (OUTPATIENT)
Dept: NEUROLOGY | Facility: CLINIC | Age: 38
End: 2023-08-11
Payer: COMMERCIAL

## 2023-08-17 NOTE — DISCHARGE SUMMARY
[facility] Neurosurgery ([facility])  [facility] Medicine  Discharge Summary      Patient Name: Thania Carroll  MRN: [id number]  LYUDMILA: [id number]  Patient Class: IP- Inpatient  Admission Date: 2023  Hospital Length of Stay: 14 days  Discharge Date and Time: 2023  1:55 PM  Attending Physician: Joanna att. providers found   Discharging Provider: Meredith Mitchell MD  Primary Care Provider: Joanna Primary Doctor  [facility] Medicine Team: [facility] MED N Meredith Mitchell MD  Primary Care Team: [facility] N    HPI:   Thania Carroll is a 38 year old white woman,  2 para 2, with juvenile rheumatoid arthritis, catatonia. She lives in [place], [place]. She is . She has two children.   She presented to [facility] Emergency Department on 2023. She had been having progressive neurological deterioration over the past 8 months with weakness, speech changes, slowing of mentation, joint contractures of hands. She can ambulate with assistance and was cognizant of where she wanted to go in the house as her family assisted her. She was able to communicate only through yes-no questions. Her symptoms seemed to have worsened with her last pregnancy in 2022, though symptoms started prior to it. She first noticed she had trouble using her hands when holding her baby in May 2022 and told her mother about it in  or July. Her family noticed that she started slowing down in early . She took longer to wash her hair, had trouble orienting things, had trouble holding onto things and losing her , then noticed she was moving more slowly in general. She underwent two neurologic workups, one at [facility] ([facility]) and the other in [place] with neurologist Dr. Franco. She was diagnosed with catatonia at [facility]. She was referred to Psychiatry. Dr. Franco performed lumbar puncture. She was hospitalized in 2023 for further workup. Dr. Franco suspected an  autoimmune disorder. She underwent an extended electroencephalogram at home that was normal. Her hands started to contract. She started having echolalia (meaningles repetition) in September/October as well as palilalia (involuntary repeating words). She had staring episodes that lasted a minute or two. She noted more muscle atrophy in November 2022. She delivered in May 2023 and was still breastfeeding an eight week old baby. She had no speech for the last 3 months. Had had several falls over the past week. Her last fall was 3 days ago. She started having emotional lability in October/November 2022.  She was seen at [facility] Neurology clinic on 7/24/2023. The only medication she had ever been on was prednisone in January with no improvement, methylprednisolone with no response, and methotrexate. She was seen by Nohemy Brady PA-C and Dr. Harshal Lyons, who recommended lumbar puncture with encephalopathy panel to send to [facility] and movement panel to rule out NMDAr encephalitis and 14-3-3, total tau RT-QulC, neurofilament light chain, inpatient IVIG and methylprednisolone, electroencephalogram to evaluate for delta brush, electromyelogram. Her father noticed intermittent choking spells when eating.   Clinic and emergency department evaluations:  Cbc. Cmp. Cpk, lipid panel- nl  Heavy metal screen 6/2023 - nl  TFTs -nl  Movement Disorder, Autoimmune pannel, cerruloplasm, negative  NA 1:160  UPT negative  Trep Ab bucky  U tox neg  Kimble's D - neg  Lyme - neg  Hereditary Parkinson's - neg  CT head 1/2023  MRI head - done 6/15.23, some atrophy out of proportion for age, particularly in bilateral caudate and putamen noted by  Nohemy Brady PA-C.    She was admitted to Hospital Medicine Team N.      Procedure(s) (LRB):  MPU PROCEDURE (N/A)      Hospital Course:   CT chest abdomen pelvis was done to assess for mass, particularly ovarian teratoma, which may be associated with  autoimmune encephalitis (especially NMDA receptor encephalitis). It showed bilateral ground glass lung attenuation but did not show an ovarian tumor, so transvaginal ultrasound was done and also showed no ovarian mass. She was put on continuous EEG to assess for abnormalities seen in NMDA receptor encephalitis (ie delta brush) vs abnormalities seen in other conditions such as Creutzfeld-Tripp Disease. Infection Control was consulted due to testing for prion disease. Fluoroscopy was consulted for lumbar puncture with opening pressure and CSF studies ordered: cell count and differential, protein, glucose, cytology and flow cytometry, gram stain and culture, autoimmune encephalopathy panel (ENC2 [facility] sendout panel), RT-quIC ([facility] prion center sendout lab). Neurology planned plasma exchange (PLEX) following lumbar puncture, and electromyography, likely outpatient. Lumbar puncture was done on 7/28/2023. CSF was used for labs ordered by the physician assistant she had seen in Neurology clinic rather than the labs ordered by the inpatient consult service, which made another lumbar puncture necessary before treatment. Lumbar puncture done, central line placed and PLEX started on 8/3/23. She completed 5 treatments and was discharged home with outpatient neurology follow up.        Goals of Care Treatment Preferences:  Code Status: Full Code      Consults:   Consults (From admission, onward)        Status Ordering Provider     Inpatient consult to Interventional Radiology  Once        Provider:  (Not yet assigned)    Completed FILOMENA PATRICK     Inpatient consult to Neurology  Once        Provider:  (Not yet assigned)    Completed FILOMENA PATRICK     Inpatient consult to Infectious Diseases  Once        Provider:  (Not yet assigned)    Completed FILOMENA PATRICK     Inpatient consult to Infectious Diseases  Once        Provider:  (Not yet assigned)    Completed ROXANNA KIRK          Neuro  * Neurodegenerative  disorder  Neurology following. CT and ultrasound showed no ovarian tumor. LP done but will need another to obtain more labs. Repeat LP done on 8/2/23. Anesthesia contacted for central line placement 8/3. PLEX started on 8/3    Bradykinesia        Pulmonary  Ground glass opacity present on imaging of lung  No fever or leukocytosis. No hypoxia. Her cough is weak but she has not been coughing. Probably aspirations. Can get a follow-up chest X-ray.     Immunology/Multi System  Juvenile rheumatoid arthritis  Chronic, stable.  GISEL :160, autoimmune panel is negative  Rheu Factor Neg      GI  Oropharyngeal dysphagia  SLP. Dental soft diet. Family wanted to defer modified barium swallow study.    Obstetric  Breastfeeding (infant)  Patient opts to continue breastfeeding. Can call the [facility] [phone] if positive for prion disease.      Final Active Diagnoses:    Diagnosis Date Noted POA    PRINCIPAL PROBLEM:  Neurodegenerative disorder [G31.9] 07/25/2023 Yes    Bradykinesia [R25.8] 07/31/2023 Yes    Breastfeeding (infant) [Z78.9] 07/26/2023 Yes    Oropharyngeal dysphagia [R13.12] 07/26/2023 Yes    Ground glass opacity present on imaging of lung [R91.8] 07/26/2023 Yes    Juvenile rheumatoid arthritis [M08.00] 07/25/2023 Yes      Problems Resolved During this Admission:       Discharged Condition: stable    Disposition: Home or Self Care    Follow Up:    Patient Instructions:      Ambulatory referral/consult to Physical/Occupational Therapy   Standing Status: Future   Referral Priority: Routine Referral Type: Physical Medicine   Referral Reason: Specialty Services Required   Number of Visits Requested: 1     Ambulatory referral/consult to Speech Therapy   Standing Status: Future   Referral Priority: Routine Referral Type: Speech Therapy   Referral Reason: Specialty Services Required   Requested Specialty: Speech Pathology   Number of Visits Requested: 1       Significant Diagnostic Studies: N/A    Pending  Diagnostic Studies:     Procedure Component Value Units Date/Time    Freeze and Hold, [facility] [[id number]] Collected: 08/02/23 1445    Order Status: Sent Lab Status: No result     Specimen: CSF (Spinal Fluid) from Cerebrospinal Fluid     Freeze and Hold, [facility] [[id number]] Collected: 07/28/23 1725    Order Status: Sent Lab Status: No result     Specimen: CSF (Spinal Fluid) from Cerebrospinal Fluid          Medications:  Reconciled Home Medications:      Medication List      STOP taking these medications    methylPREDNISolone sodium succinate 1,000 mg injection  Commonly known as: SOLU-MEDROL            Indwelling Lines/Drains at time of discharge:   Lines/Drains/Airways     None                 Time spent on the discharge of patient: > 30 minutes         Merdeith Mitchell MD  Department of Hospital Medicine  [facility] - Neurosurgery ([facility])

## 2023-08-17 NOTE — ASSESSMENT & PLAN NOTE
Patient opts to continue breastfeeding. Can call the [facility] [phone] if positive for prion disease.

## 2023-09-01 NOTE — TELEPHONE ENCOUNTER
Spoke with spouse. No improvement with plex. Presentation concerning for neurodegenerative disorder. Discussed leukodystrophy panel.  agrees. [employer] order placed.       RBR

## 2023-10-04 ENCOUNTER — OFFICE VISIT (OUTPATIENT)
Dept: NEUROLOGY | Facility: CLINIC | Age: 38
End: 2023-10-04
Payer: COMMERCIAL

## 2023-10-04 ENCOUNTER — TELEPHONE (OUTPATIENT)
Dept: NEUROLOGY | Facility: CLINIC | Age: 38
End: 2023-10-04

## 2023-10-04 DIAGNOSIS — R13.12 OROPHARYNGEAL DYSPHAGIA: ICD-10-CM

## 2023-10-04 DIAGNOSIS — R68.89 ABULIA: ICD-10-CM

## 2023-10-04 DIAGNOSIS — M62.50 MUSCULAR ATROPHY, UNSPECIFIED SITE: ICD-10-CM

## 2023-10-04 DIAGNOSIS — R26.81 GAIT INSTABILITY: ICD-10-CM

## 2023-10-04 DIAGNOSIS — G31.9 NEURODEGENERATIVE DISORDER: ICD-10-CM

## 2023-10-04 DIAGNOSIS — Z71.89 COUNSELING REGARDING GOALS OF CARE: ICD-10-CM

## 2023-10-04 DIAGNOSIS — F48.2 PSEUDOBULBAR AFFECT: ICD-10-CM

## 2023-10-04 DIAGNOSIS — G20.C PARKINSONISM, UNSPECIFIED PARKINSONISM TYPE: Primary | ICD-10-CM

## 2023-10-04 PROCEDURE — 99215 OFFICE O/P EST HI 40 MIN: CPT | Mod: 95,,, | Performed by: PHYSICIAN ASSISTANT

## 2023-10-04 PROCEDURE — 99215 PR OFFICE/OUTPT VISIT, EST, LEVL V, 40-54 MIN: ICD-10-PCS | Mod: 95,,, | Performed by: PHYSICIAN ASSISTANT

## 2023-10-04 NOTE — PROGRESS NOTES
Neurology Telemedicine Note     Name: Thania Carroll  MRN: [id number]   CSN: [id number]      Date: 10/04/2023    The patient location is: Home  The chief complaint leading to consultation is: neurodegenerative disorder   Visit type: Virtual visit with synchronous audio and video    TOTAL TIME SPENT: 51 min    Each patient to whom I provide medical services by telemedicine is:  (1) informed of the relationship between the physician and patient and the respective role of any other health care provider with respect to management of the patient; and (2) notified that they may decline to receive medical services by telemedicine and may withdraw from such care at any time.    Interval History:  - no improvement with plex   - more difficulty communicating   - doing PT/st  - almost completely non-verbal   - swallowing seems to be slightly better   - fewer choking episodes   - maybe one fall since last visit   - mother with her today who provides history  - family very supportive to keep her safe   - posture is worse   - takes a lot of strength to keep her head up  -   -  -    From July 2023  - 5 day course of solumedrol that did not show much improvement   - maybe some euphoria while on steroids   - more imbalance   - several falls since last visit, when going from sitting to standing, turning   - accompanied by spouse and parents today   - 5 years prior she traveled to [place], [place], [facility]   - had neurofilament light chain at outside lab  - 8.78 pg/ml (reference range 0.0-1.87)  - some choking on foods and liquids            From June 2023: Thania Carroll is a R handed 38 y.o. female with a medical issues significant for juvenile RA who presents for bradykinesia, apathy. She was seen at outside facility and diagnosed with catatonia and referred to psychiatry for management. She has no known past psychiatric illness. Accompanied by , mother and father. Family provides the majority of the history. She first  noticed that she had trouble using her hands with holding her baby in May of 2022. She told her mom about in June/July 2022. They think maybe she started slowing down early 2022. Noticed that it would take her longer to wash her hair. She had trouble orienting things, had trouble holding onto things and losing her . Then they noticed she was moving slower entirely, walking slower.  noticed that she would turn slowly as well. No known psychiatric illness. The only medication she has ever been on was prednisone and methotrexate. She was not pregnant at the time whenever everything started. Two kids, 2 year and 6 weeks. Saw neurologist at [facility] in Nov 2022 and then saw another neurologist in [place] (Dr. Franco). At [facility], she was diagnosed with catatonia after seeing a psychiatry intern?. She was pregnant at this time. She never had a trial of benzos. She is still breast feeding. Dr. Franco did LP. She was hospitalized in January for further work up. She was told that she was healthy. They state that Dr. Franco suspected an autoimmune disorder. She did an extended EEG at the [place], it was normal. They have not followed back up with Dr. Franco. Hands started to claw up at the beginning. She has had more muscle atrophy starting in Nov 2022. Speech started changing in fall last year. Since November she has had even more of a decline. Seemed like things got worse whenever she was pregnant (End of August). She was still driving in August although she was still very slow. 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 She started with echolalia in Sept/Oct as well as palilalia. She would have episodes -- staring, and then come back too. Would last for a minute or two. Mom thought that her speech got better after she had the baby for a day or two. She was able to communicate just a little better. She started having more gait instability in June 2022 and tripped over something whenever she went  to check on their son. She had a few other falls in November whenever she got out of the bathtub, fell backwards. Another fall in January. Laughs and cries at odd times, started in October/November. She had medrol dose pack in January. No improvement.            Nonmotor/Premotor ROS: as per HPI, and all other systems are negative    Past Medical History: The patient  has a past medical history of Catatonia and Juvenile rheumatoid arthritis.    Social History: The patient      Family History: Their family history is not on file.    Allergies: Patient has no known allergies.     Meds:   No current outpatient medications on file prior to visit.     No current facility-administered medications on file prior to visit.       Exam:  Vital Signs deferred with home visit    Constitutional  Well-developed, well-nourished, appears stated age   Eyes  No scleral icterus   ENT  Moist oral mucosa   Cardiovascular  No lower extremity edema    Respiratory  No labored breathing    Skin  No rashes   Hematologic  No bruising   Psychiatric  Normal mood and affect   Other  GI/ deferred    Neurological     * Mental status  Non verbal    * Cranial nerves     - CN II  Pupils midposition and symmetric   - CN III, IV, VI  Possible SWJs   - CN V  Unable to test   - CN VII  Unable to follow commands    - CN VIII  Hearing grossly intact with conversation    - CN IX, X  Unable to follow commands    - CN XI  Unable to follow commands    - CN XII  Tongue appears midline    * Motor  Decreased muscle bulk throughout    * Sensory  Not tested objectively, no changes described by the patient   * Deep tendon reflexes  Not tested   * Coord/Movemt/Gait Non verbal   No facial masking.  Unable to test for bradykinesia, unable to follow commands   No motor impersistence.  Gait is deferred for safety.       Medical Record Review:  - Lab Results:  No results displayed because visit has over 200 results.      Hospital Outpatient Visit on 07/28/2023    Component Date Value Ref Range Status    Heme Aliquot 07/28/2023 3.0  mL Final    Appearance, CSF 07/28/2023 Clear  Clear Final    Color, CSF 07/28/2023 Colorless  Colorless Final    WBC, CSF 07/28/2023 0  0 - 5 /cu mm Final    RBC, CSF 07/28/2023 1 (A)  0 /cu mm Final    CSF, Comment 07/28/2023 SEE COMMENT   Final    Encephalopathy Interpretation, CSF 07/28/2023 SEE BELOW   Final    AMPA-R Ab CBA, CSF 07/28/2023 Negative  Negative Final    PNEOE, Amphiphysin Ab, Csf 07/28/2023 Negative  Negative Final    PNEOE AGNA-1, Csf 07/28/2023 Negative  Negative Final    PNEOE EDD-1, Csf 07/28/2023 Negative  Negative Final    PNEOE EDD-2, Csf 07/28/2023 Negative  Negative Final    PNEOE EDD-3, Csf 07/28/2023 Negative  Negative Final    CASPR2-IgG CBA, CSF 07/28/2023 Negative  Negative Final    PNEOE,  CRMP-5-IgG, Csf 07/28/2023 Negative  Negative Final    DPPX Ab IFA, CSF 07/28/2023 Negative  Negative Final    SALINA-B-R Ab, CBA, CSF 07/28/2023 Negative  Negative Final    EZE Antibody, CSF 07/28/2023 0.00  <=0.02 nmol/L Final    GFAP IFA, CSF 07/28/2023 Negative  Negative Final    LGI1-IgG CBA,CSF 07/28/2023 Negative  Negative Final    mGluR1 Ab CBA, CSF 07/28/2023 Negative  Negative Final    NMDA-R Ab CBA, CSF 07/28/2023 Negative  Negative Final    PNEOE, PCA-Tr, Csf 07/28/2023 Negative  Negative Final    PNEOE, PCA-1, Csf 07/28/2023 Negative  Negative Final    PNEOE, PCA-2, Csf 07/28/2023 Negative  Negative Final    IgLON5 IFA, CSF 07/28/2023 Negative  Negative Final    NIF IFA, CSF 07/28/2023 Negative  Negative Final    M SEPTIN-7 IFA, CSF 07/28/2023 Negative  Negative Final    Neurochondrin IFA, CSF 07/28/2023 Negative  Negative Final    IFA NOTES 07/28/2023 None.   Final    14-3-3 Protein, Spinal Fluid 07/28/2023 See Note   Final    Heme Aliquot 07/28/2023 4.0  mL Final    Appearance, CSF 07/28/2023 Clear  Clear Final    Color, CSF 07/28/2023 Colorless  Colorless Final    WBC, CSF 07/28/2023 0  0 - 5 /cu mm Final     RBC, CSF 07/28/2023 0  0 /cu mm Final    CSF, Comment 07/28/2023 SEE COMMENT   Final    CSF Tube Number 07/28/2023 2   Final    Protein, CSF 07/28/2023 38  15 - 40 mg/dL Final    CSF Tube Number 07/28/2023 2   Final    Glucose, CSF 07/28/2023 53  40 - 70 mg/dL Final         Assessment/Plan:  ASSESSMENT/PLAN:  Parkinsonism/bradykinesia -- neurodegenerative disorder   - concerning for neurodenegerative vs autoimmune disorder   - frontal lobe dysfunction, PBA, saccadic intrusions and blink to initiate saccades   - not clearly better with pregnancy (in fact they think it got worse)  - MRI called normal however there is atrophy out of proportion for age, particularly in bilateral caudate and putamen   - HD testing and genetic PD/FTD panel negative   - negative for acanthocytosis   - PT/OT/ST   - no clear improvement with just solumedrol x 5 days or plex   - rtquic neg, 14-3-3 and tau pos   - EMG ordered today-- PSP/PLS? FTD/ALS?   - repeat brain MRI                Collaborating Physician, Dr. Lyons, was available during today's encounter. Any change to plan along with cosign to appear in the EMR.       I spent 51 minutes with the patient, reviewing past encounters, labs and imaging.          Nohemy Brady PA-C   [facility]  Department of Neurology  Movement Disorders

## 2023-10-04 NOTE — TELEPHONE ENCOUNTER
----- Message from Nohemy Brady PA-C sent at 10/4/2023 11:50 AM CDT -----  Needs EMG rather urgently

## 2023-10-04 NOTE — TELEPHONE ENCOUNTER
----- Message from Nohemy Brady PA-C sent at 10/4/2023 12:06 PM CDT -----  Will also need brain MRI scheduled the same day as the EMG

## 2023-10-11 ENCOUNTER — PATIENT MESSAGE (OUTPATIENT)
Dept: NEUROLOGY | Facility: CLINIC | Age: 38
End: 2023-10-11
Payer: COMMERCIAL

## 2023-10-11 ENCOUNTER — TELEPHONE (OUTPATIENT)
Dept: NEUROLOGY | Facility: CLINIC | Age: 38
End: 2023-10-11
Payer: COMMERCIAL

## 2023-10-11 NOTE — TELEPHONE ENCOUNTER
----- Message from Sahara Eagle MA sent at 10/4/2023  4:15 PM CDT -----  Hello , thank you so much, can you  help schedule this pt.      ----- Message -----  From: Nohemy Brday PA-C  Sent: 10/4/2023  11:50 AM CDT  To: Sahara Eagle MA    Needs EMG rather urgently

## 2023-10-11 NOTE — TELEPHONE ENCOUNTER
Spoke to Ms. Cody's mother in regards to scheduling the EMG Procedure. I offered her an appointment on Wednesday August 18th for 9am but Ms. White stated that she needed a little more of a notice because of traveling. She scheduled Thania's EMG for Tuesday October 24th for 2pm. We went over the details and she was also informed that details about the appointment would be available on the patient portal. A confirmation letter was printed and is being placed in the mail today.

## 2023-10-24 ENCOUNTER — PROCEDURE VISIT (OUTPATIENT)
Dept: NEUROLOGY | Facility: CLINIC | Age: 38
End: 2023-10-24
Payer: COMMERCIAL

## 2023-10-24 DIAGNOSIS — M62.50 MUSCULAR ATROPHY, UNSPECIFIED SITE: ICD-10-CM

## 2023-10-24 DIAGNOSIS — G20.C PARKINSONISM, UNSPECIFIED PARKINSONISM TYPE: ICD-10-CM

## 2023-10-24 PROCEDURE — 95911 PR NERVE CONDUCTION STUDY; 9-10 STUDIES: ICD-10-PCS | Mod: S$GLB,,, | Performed by: PSYCHIATRY & NEUROLOGY

## 2023-10-24 PROCEDURE — 95886 MUSC TEST DONE W/N TEST COMP: CPT | Mod: S$GLB,,, | Performed by: PSYCHIATRY & NEUROLOGY

## 2023-10-24 PROCEDURE — 95886 PR EMG COMPLETE, W/ NERVE CONDUCTION STUDIES, 5+ MUSCLES: ICD-10-PCS | Mod: S$GLB,,, | Performed by: PSYCHIATRY & NEUROLOGY

## 2023-10-24 PROCEDURE — 95911 NRV CNDJ TEST 9-10 STUDIES: CPT | Mod: S$GLB,,, | Performed by: PSYCHIATRY & NEUROLOGY

## 2023-10-24 NOTE — PROCEDURES
Procedures      Please see NCS/EMG procedure report    Marquise Ramirez MD  [facility]

## 2023-11-21 ENCOUNTER — TELEPHONE (OUTPATIENT)
Dept: NEUROLOGY | Facility: CLINIC | Age: 38
End: 2023-11-21
Payer: COMMERCIAL

## 2023-11-21 NOTE — TELEPHONE ENCOUNTER
----- Message from Candace Wallace sent at 11/21/2023  3:54 PM CST -----  Type:  Rep Call    Who Called: Neli   Best Call Back Number:[phone]  Additional Information:  Per  Neli  she's calling regarding  an order that  was  fax  over to this office  regarding speech kit  for  pt

## 2023-11-27 ENCOUNTER — TELEPHONE (OUTPATIENT)
Dept: NEUROLOGY | Facility: CLINIC | Age: 38
End: 2023-11-27
Payer: COMMERCIAL

## 2023-11-27 NOTE — TELEPHONE ENCOUNTER
----- Message from Jorge Cooley sent at 11/27/2023  1:32 PM CST -----  Regarding: pt advice  Contact: Neli [phone]  Neli calling [employer] to confirm that the office has received documents faxed over on pt behalf. Please call to further advise. Thanks for all you are doing.

## 2023-11-27 NOTE — TELEPHONE ENCOUNTER
I was unable to locate the form that was faxed.  Called and give Neli another fax number, [phone].

## 2023-12-01 ENCOUNTER — TELEPHONE (OUTPATIENT)
Dept: NEUROLOGY | Facility: CLINIC | Age: 38
End: 2023-12-01
Payer: COMMERCIAL

## 2023-12-01 NOTE — TELEPHONE ENCOUNTER
----- Message from Tonia Petersonry sent at 12/1/2023  9:54 AM CST -----  .Type:  Patient Call Back    Who Called: Neli from [employer]       Does the patient know what this is regarding?: Neli called to confirm that the office has received documents faxed over on pt behalf.      Would the patient rather a call back Yes     Best Call Back Number: [phone]    Additional Information: Thank You

## 2024-02-08 ENCOUNTER — PATIENT MESSAGE (OUTPATIENT)
Dept: NEUROLOGY | Facility: CLINIC | Age: 39
End: 2024-02-08
Payer: COMMERCIAL

## 2024-02-19 RX ORDER — BACLOFEN 10 MG/1
10 TABLET ORAL 3 TIMES DAILY PRN
Qty: 90 TABLET | Refills: 11 | Status: SHIPPED | OUTPATIENT
Start: 2024-02-19 | End: 2025-02-18

## 2024-04-17 ENCOUNTER — TELEPHONE (OUTPATIENT)
Dept: NEUROLOGY | Facility: CLINIC | Age: 39
End: 2024-04-17

## 2024-04-17 ENCOUNTER — OFFICE VISIT (OUTPATIENT)
Dept: NEUROLOGY | Facility: CLINIC | Age: 39
End: 2024-04-17
Payer: COMMERCIAL

## 2024-04-17 DIAGNOSIS — R26.81 GAIT INSTABILITY: ICD-10-CM

## 2024-04-17 DIAGNOSIS — M62.50 MUSCULAR ATROPHY, UNSPECIFIED SITE: ICD-10-CM

## 2024-04-17 DIAGNOSIS — G20.C PARKINSONISM, UNSPECIFIED PARKINSONISM TYPE: ICD-10-CM

## 2024-04-17 DIAGNOSIS — R13.12 OROPHARYNGEAL DYSPHAGIA: ICD-10-CM

## 2024-04-17 DIAGNOSIS — G31.9 NEURODEGENERATIVE DISORDER: ICD-10-CM

## 2024-04-17 DIAGNOSIS — R68.89 ABULIA: ICD-10-CM

## 2024-04-17 DIAGNOSIS — G12.23 PRIMARY LATERAL SCLEROSES: Primary | ICD-10-CM

## 2024-04-17 DIAGNOSIS — F48.2 PSEUDOBULBAR AFFECT: ICD-10-CM

## 2024-04-17 DIAGNOSIS — Z71.89 COUNSELING REGARDING GOALS OF CARE: ICD-10-CM

## 2024-04-17 DIAGNOSIS — G23.1: Primary | ICD-10-CM

## 2024-04-17 DIAGNOSIS — G23.1: ICD-10-CM

## 2024-04-17 PROCEDURE — G2211 COMPLEX E/M VISIT ADD ON: HCPCS | Mod: 95,,, | Performed by: PHYSICIAN ASSISTANT

## 2024-04-17 PROCEDURE — 99215 OFFICE O/P EST HI 40 MIN: CPT | Mod: 95,,, | Performed by: PHYSICIAN ASSISTANT

## 2024-04-17 NOTE — PROGRESS NOTES
Neurology Telemedicine Note     Name: Thania Carroll  MRN: [id number]   CSN: [id number]      Date: 04/17/2024    The patient location is: Home  The chief complaint leading to consultation is: neurodegenerative disorder   Visit type: Virtual visit with synchronous audio and video    TOTAL TIME SPENT: 51 min    Each patient to whom I provide medical services by telemedicine is:  (1) informed of the relationship between the physician and patient and the respective role of any other health care provider with respect to management of the patient; and (2) notified that they may decline to receive medical services by telemedicine and may withdraw from such care at any time.    Interval History:  - with mother who provides entire history   - no longer having movement in her arms or hand    - no speech now, completely non verbal   - moans some, cries and laughs   - laughs when something is funny and cries whenever she is upset   - some difficulty swallowing, mostly on liquids   - tried thickener last night which was helpful   - muscle wasting in arms and hands are contracted   - still doing therapy OT, PT   - cannot hold her head up, wears neck brace   - had EMG which did not show MND   - cannot purse lips to suck through a straw   - possibly having some tongue weakness as well   - more drooling   - muscle spasms come and go, worse whenever she is upset  - worse if she is cold   - baclofen 10 mg PRN for muscle spasms, unclear if its helping   - core muscles are weak, cannot sit up on her own without assistance   - they have a safety belt    - cannot stand on her own, still tries to walk with assistance   - drags her left foot, R foot gets stuck  - having trouble brushing her teeth, bites the toothbrush   - abdominal movements are less compared to prior   - one UTI   - knows whenever she needs to go to the bathroom, makes it to the bathroom most of the time   - uses a pad   - one time went 14 hours without urinating   -  constipation is an issue, tried full dose of miralax daily but caused loose stools   - lost 15-20 lbs   - still nursing but starting to wean   - has not had menstrual cycle in two years         From Oct 2023  - no improvement with plex   - more difficulty communicating   - doing PT/st  - almost completely non-verbal   - swallowing seems to be slightly better   - fewer choking episodes   - maybe one fall since last visit   - mother with her today who provides history  - family very supportive to keep her safe   - posture is worse   - takes a lot of strength to keep her head up      From July 2023  - 5 day course of solumedrol that did not show much improvement   - maybe some euphoria while on steroids   - more imbalance   - several falls since last visit, when going from sitting to standing, turning   - accompanied by spouse and parents today   - 5 years prior she traveled to [place], [place], [place], [facility]   - had neurofilament light chain at outside lab  - 8.78 pg/ml (reference range 0.0-1.87)  - some choking on foods and liquids            From June 2023: Thania Carroll is a R handed 38 y.o. female with a medical issues significant for juvenile RA who presents for bradykinesia, apathy. She was seen at outside facility and diagnosed with catatonia and referred to psychiatry for management. She has no known past psychiatric illness. Accompanied by , mother and father. Family provides the majority of the history. She first noticed that she had trouble using her hands with holding her baby in May of 2022. She told her mom about in June/July 2022. They think maybe she started slowing down early 2022. Noticed that it would take her longer to wash her hair. She had trouble orienting things, had trouble holding onto things and losing her . Then they noticed she was moving slower entirely, walking slower.  noticed that she would turn slowly as well. No known psychiatric illness. The only medication she has  ever been on was prednisone and methotrexate. She was not pregnant at the time whenever everything started. Two kids, 2 year and 6 weeks. Saw neurologist at [facility] in Nov 2022 and then saw another neurologist in [place] (Dr. Franco). At [facility], she was diagnosed with catatonia after seeing a psychiatry intern?. She was pregnant at this time. She never had a trial of benzos. She is still breast feeding. Dr. Franco did LP. She was hospitalized in January for further work up. She was told that she was healthy. They state that Dr. Franco suspected an autoimmune disorder. She did an extended EEG at the house, it was normal. They have not followed back up with Dr. Franco. Hands started to claw up at the beginning. She has had more muscle atrophy starting in Nov 2022. Speech started changing in fall last year. Since November she has had even more of a decline. Seemed like things got worse whenever she was pregnant (End of August). She was still driving in August although she was still very slow. 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 She started with echolalia in Sept/Oct as well as palilalia. She would have episodes -- staring, and then come back too. Would last for a minute or two. Mom thought that her speech got better after she had the baby for a day or two. She was able to communicate just a little better. She started having more gait instability in June 2022 and tripped over something whenever she went to check on their son. She had a few other falls in November whenever she got out of the bathtub, fell backwards. Another fall in January. Laughs and cries at odd times, started in October/November. She had medrol dose pack in January. No improvement.            Nonmotor/Premotor ROS: as per HPI, and all other systems are negative    Past Medical History: The patient  has a past medical history of Catatonia and Juvenile rheumatoid arthritis.    Social History: The patient      Family History:  Their family history is not on file.    Allergies: Patient has no known allergies.     Meds:   Current Outpatient Medications on File Prior to Visit   Medication Sig Dispense Refill    baclofen (LIORESAL) 10 MG tablet Take 1 tablet (10 mg total) by mouth 3 (three) times daily as needed (for muscle spasms). 90 tablet 11     No current facility-administered medications on file prior to visit.       Exam:  Vital Signs deferred with home visit    Constitutional  Well-developed, well-nourished, appears stated age   Eyes  No scleral icterus   ENT  Moist oral mucosa   Cardiovascular  No lower extremity edema    Respiratory  No labored breathing    Skin  No rashes   Hematologic  No bruising   Psychiatric  Normal mood and affect   Other  GI/ deferred    Neurological     * Mental status  Non verbal    * Cranial nerves     - CN II  Pupils midposition and symmetric   - CN III, IV, VI  Possible SWJs   - CN V  Unable to test   - CN VII  Unable to follow commands    - CN VIII  Hearing grossly intact with conversation    - CN IX, X  Unable to follow commands    - CN XI  Unable to follow commands    - CN XII  Tongue appears midline    * Motor  Decreased muscle bulk throughout    * Sensory  Not tested objectively, no changes described by the patient   * Deep tendon reflexes  Not tested   * Coord/Movemt/Gait Non verbal   No facial masking.  Unable to test for bradykinesia, unable to follow commands   No motor impersistence.  Gait is deferred for safety.       Medical Record Review:  - Lab Results:  No visits with results within 3 Month(s) from this visit.   Latest known visit with results is:   No results displayed because visit has over 200 results.            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 FTD/ALS? -- emg normal            This is a patient with a serious and complex neurologic diagnosis whose overall, ongoing care is being managed and monitored by me and our Neurology clinic.   As such, since 2024,  is the appropriate add-on code to accompany the other E/M billing for this visit.      Collaborating Physician, Dr. Lyons, was available during today's encounter. Any change to plan along with cosign to appear in the EMR.       I spent 56 minutes with the patient, reviewing past encounters, labs and imaging.          Nohemy Brady PA-C   [facility]  Department of Neurology  Movement Disorders

## 2024-04-17 NOTE — TELEPHONE ENCOUNTER
----- Message from Nohemy Brady PA-C sent at 4/17/2024 10:37 AM CDT -----  Needs more PT, OT, ST att: Patricia Segovia (also business name) in [place]

## 2024-04-18 ENCOUNTER — PATIENT MESSAGE (OUTPATIENT)
Dept: NEUROLOGY | Facility: CLINIC | Age: 39
End: 2024-04-18
Payer: COMMERCIAL

## 2024-05-15 ENCOUNTER — TELEPHONE (OUTPATIENT)
Dept: NEUROLOGY | Facility: CLINIC | Age: 39
End: 2024-05-15
Payer: COMMERCIAL

## 2024-05-15 NOTE — TELEPHONE ENCOUNTER
"Per request of Nohemy Brady PA-C, MARYSOL called pt's mother Cindy to discuss her disability claim. Cindy put pt on speaker phone; pt is nonverbal. 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   Had child in 2021 and then pregnant again in 2022 (sx started while pregnant), gave birth May 2023   Returned to work after 1st maternity leave, left while pregnant with second when sx arose.    SSDI application is in process, Thania gave permission via mother for MARYSOL to coordinate with  Paulo who handles claim. Cindy thinks it was submitted 6 or so months ago.    MARYSOL called and spoke with pt's  Paulo re: SSDI claim #: [id number]  Reading from the [facility]'s application portal, Paulo says they are taking "352 days on average to process SSDI claims" and the claim is 65% done, started in September 2023. Started "medical review" in Feb 2024 and stated that this process may take up to 6 mo.   Claim @ [facility] office, Paulo has spoken with Ms. Gentile [phone] ext [phone]. MARYSOL left  3:30pm 5/15/24    MARYSOL began process of augmenting SSDI claim with medical records, will inform/ consult with Ms. Gentile about this should she reach out to MARYSOL.   Augment your Social Security Disability Insurance (SSDI) claim by submitting a Reconsideration Disability Report (RDR) with [facility]. The SDR provides the Social Security Administration (SSA) with new and relevant information about your claim, such as treatments you've had and your response to them. MARYSOL gained permission from Thania to submit an Authorization to Disclose Information to the SSA by speaking with Cindy and Thania again, letting them know the game plan. Also gained Thania's permission (via affirmative blink to mom who communicated this to MARYSOL) to share her medical records from the past year w/ [facility] for SSDI claim.    Loli Aguillon, EVIE  She/ Her    ? Neurology ? Movement " Disorders  [facility] ? [address] [address]   P: [phone]  F: [phone]         Loli Aguillon LMSW  She/ Her    ? Neurology ? Movement Disorders  [facility] ? [address] [address]   P: [phone]  F: [phone]

## 2024-05-16 ENCOUNTER — PATIENT MESSAGE (OUTPATIENT)
Dept: NEUROLOGY | Facility: CLINIC | Age: 39
End: 2024-05-16
Payer: COMMERCIAL

## 2024-05-21 ENCOUNTER — TELEPHONE (OUTPATIENT)
Dept: NEUROLOGY | Facility: CLINIC | Age: 39
End: 2024-05-21
Payer: COMMERCIAL

## 2024-05-21 NOTE — TELEPHONE ENCOUNTER
- spoke to   - saw PCP last week  - had UTI, treated  - referral for Dr. Trejo for GI doc for PEG tube  - no other immediate needs right now

## 2024-05-21 NOTE — TELEPHONE ENCOUNTER
MARYSOL left VM for Ms. Gentile at [facility] office, inquiring about augmenting pt's SSDI application with visit notes and an updated dx.     MARYSOL updated pt's  Paulo, who reported that pt's date that disability began/ application for SSDI occurred on September 2, 2023.

## 2024-05-22 ENCOUNTER — PATIENT MESSAGE (OUTPATIENT)
Dept: NEUROLOGY | Facility: CLINIC | Age: 39
End: 2024-05-22
Payer: COMMERCIAL

## 2024-07-16 ENCOUNTER — PATIENT MESSAGE (OUTPATIENT)
Dept: NEUROLOGY | Facility: CLINIC | Age: 39
End: 2024-07-16
Payer: COMMERCIAL

## 2024-07-16 RX ORDER — BACLOFEN 20 MG/1
20 TABLET ORAL 3 TIMES DAILY PRN
Qty: 90 TABLET | Refills: 11 | Status: SHIPPED | OUTPATIENT
Start: 2024-07-16 | End: 2025-07-16

## 2024-07-17 NOTE — TELEPHONE ENCOUNTER
Pt family request an eye gaze device. 1st step would be for an evaluation is called an AAC assessment. AAC stands for alternative and augmentative communication. In order to have a thorough evaluation you and your SLP will need to research what type of eye gaze devices are available in your area.

## 2024-11-21 ENCOUNTER — PATIENT MESSAGE (OUTPATIENT)
Dept: NEUROLOGY | Facility: CLINIC | Age: 39
End: 2024-11-21
Payer: COMMERCIAL

## 2024-11-21 DIAGNOSIS — K11.7 SIALORRHEA: Primary | ICD-10-CM

## 2024-11-26 RX ORDER — ATROPINE SULFATE 10 MG/ML
1 SOLUTION/ DROPS OPHTHALMIC 3 TIMES DAILY PRN
Qty: 5 ML | Refills: 5 | Status: SHIPPED | OUTPATIENT
Start: 2024-11-26

## 2024-11-26 NOTE — TELEPHONE ENCOUNTER
Spoke to pt spouse. Explained about atropine dosage.  Atropine pended.  1drop PRN up to 3 times per day.Check dose.  Reviewed side effects and administer sublingually.     Has peg tube.     Asks for muscle relaxer that may help better at night time. Pt wakes up and is moaning uncomfortably and has to be repositioned a couple of times.  Does not seem to get proper rest at bedtime, per spouse. Needs more muscle relaxer or a different kind for night?   Recommended to try tylenol at PM too.     Using eye gaze machine for communication. But does not work well during the night.     Was awarded disability. Will have medicare at the first of the year. Spouse may be interested in palliative care.

## 2025-04-08 ENCOUNTER — PATIENT MESSAGE (OUTPATIENT)
Facility: CLINIC | Age: 40
End: 2025-04-08
Payer: COMMERCIAL

## 2025-04-22 ENCOUNTER — PATIENT MESSAGE (OUTPATIENT)
Facility: CLINIC | Age: 40
End: 2025-04-22
Payer: COMMERCIAL

## 2025-05-16 ENCOUNTER — PATIENT MESSAGE (OUTPATIENT)
Facility: CLINIC | Age: 40
End: 2025-05-16
Payer: COMMERCIAL